# Patient Record
Sex: MALE | Race: BLACK OR AFRICAN AMERICAN | NOT HISPANIC OR LATINO | Employment: OTHER | RURAL
[De-identification: names, ages, dates, MRNs, and addresses within clinical notes are randomized per-mention and may not be internally consistent; named-entity substitution may affect disease eponyms.]

---

## 2019-10-31 ENCOUNTER — HISTORICAL (OUTPATIENT)
Dept: ADMINISTRATIVE | Facility: HOSPITAL | Age: 79
End: 2019-10-31

## 2019-11-01 LAB
LAB AP CLINICAL INFORMATION: NORMAL
LAB AP DIAGNOSIS - HISTORICAL: NORMAL
LAB AP GROSS PATHOLOGY - HISTORICAL: NORMAL
LAB AP SPECIMEN SUBMITTED - HISTORICAL: NORMAL

## 2022-11-08 ENCOUNTER — OFFICE VISIT (OUTPATIENT)
Dept: UROLOGY | Facility: CLINIC | Age: 82
End: 2022-11-08
Payer: MEDICARE

## 2022-11-08 VITALS
BODY MASS INDEX: 22.58 KG/M2 | SYSTOLIC BLOOD PRESSURE: 136 MMHG | HEIGHT: 68 IN | HEART RATE: 112 BPM | DIASTOLIC BLOOD PRESSURE: 84 MMHG | WEIGHT: 149 LBS

## 2022-11-08 DIAGNOSIS — N41.1 CHRONIC PROSTATITIS: ICD-10-CM

## 2022-11-08 DIAGNOSIS — N40.1 BENIGN PROSTATIC HYPERPLASIA WITH NOCTURIA: ICD-10-CM

## 2022-11-08 DIAGNOSIS — R35.1 BENIGN PROSTATIC HYPERPLASIA WITH NOCTURIA: ICD-10-CM

## 2022-11-08 DIAGNOSIS — R80.9 PROTEINURIA, UNSPECIFIED TYPE: ICD-10-CM

## 2022-11-08 DIAGNOSIS — N52.9 ERECTILE DYSFUNCTION, UNSPECIFIED ERECTILE DYSFUNCTION TYPE: ICD-10-CM

## 2022-11-08 DIAGNOSIS — R35.1 NOCTURIA: Primary | ICD-10-CM

## 2022-11-08 PROCEDURE — 99214 PR OFFICE/OUTPT VISIT, EST, LEVL IV, 30-39 MIN: ICD-10-PCS | Mod: S$PBB,,, | Performed by: UROLOGY

## 2022-11-08 PROCEDURE — 3079F DIAST BP 80-89 MM HG: CPT | Mod: CPTII,,, | Performed by: UROLOGY

## 2022-11-08 PROCEDURE — 99213 OFFICE O/P EST LOW 20 MIN: CPT | Mod: PBBFAC | Performed by: UROLOGY

## 2022-11-08 PROCEDURE — 99214 OFFICE O/P EST MOD 30 MIN: CPT | Mod: S$PBB,,, | Performed by: UROLOGY

## 2022-11-08 PROCEDURE — 1160F PR REVIEW ALL MEDS BY PRESCRIBER/CLIN PHARMACIST DOCUMENTED: ICD-10-PCS | Mod: CPTII,,, | Performed by: UROLOGY

## 2022-11-08 PROCEDURE — 3075F PR MOST RECENT SYSTOLIC BLOOD PRESS GE 130-139MM HG: ICD-10-PCS | Mod: CPTII,,, | Performed by: UROLOGY

## 2022-11-08 PROCEDURE — 3075F SYST BP GE 130 - 139MM HG: CPT | Mod: CPTII,,, | Performed by: UROLOGY

## 2022-11-08 PROCEDURE — 1159F PR MEDICATION LIST DOCUMENTED IN MEDICAL RECORD: ICD-10-PCS | Mod: CPTII,,, | Performed by: UROLOGY

## 2022-11-08 PROCEDURE — 3079F PR MOST RECENT DIASTOLIC BLOOD PRESSURE 80-89 MM HG: ICD-10-PCS | Mod: CPTII,,, | Performed by: UROLOGY

## 2022-11-08 PROCEDURE — 1159F MED LIST DOCD IN RCRD: CPT | Mod: CPTII,,, | Performed by: UROLOGY

## 2022-11-08 PROCEDURE — 1160F RVW MEDS BY RX/DR IN RCRD: CPT | Mod: CPTII,,, | Performed by: UROLOGY

## 2022-11-08 RX ORDER — DESMOPRESSIN ACETATE 0.2 MG/1
0.2 TABLET ORAL NIGHTLY
Qty: 30 TABLET | Refills: 11 | Status: SHIPPED | OUTPATIENT
Start: 2022-11-08 | End: 2023-11-08

## 2022-11-08 RX ORDER — NIFEDIPINE 30 MG/1
30 TABLET, FILM COATED, EXTENDED RELEASE ORAL DAILY
COMMUNITY
Start: 2022-09-03

## 2022-11-11 NOTE — PROGRESS NOTES
Subjective:       Patient ID: Pardeep Collins is a 82 y.o. male.    Chief Complaint: Prostate Check (Last seen 10/17/2019)    History of Present Illness  Mr. Collins is in for one-year followup visit. He does have nocturia about 3 times nightly but feels he is voiding okay and feels like he is doing as well as is a year ago. He Thought he had PSA done but there is not in the system so we need to get a PSA done as he is only 71 years old. The main thing we have treated for over the years is been for erectile dysfunction problems. We taught him intracavernosal injection technique several years ago and he still uses this for impotence. Voids fairly well and says only that he takes to help him with urination is saw palmetto that the takes over-the-counter.  Only other health problems past year has been from gout involving his feet. He says that he is having to reduce the amount of red meat that he ingests because of this problem.    The above note is the note of December 2011  and Mr. Collins has had a generally good year. No flareups of gout. Nocturia about 3 times nightly but has less daytime frequency. No significant health problems.  In for PSA and recheck.    The above note is a note of December 11, 2012. Mr. Collins continues to have problems with nocturia which is now 5 or 6 times nightly typically. He has decreased urine output during the daytime but nighttime is when he makes all of his urine seemingly. He has also had flareups of gout since we saw him last. Patient has elevated protein and apparently something is missing in his blood and is going to see oncologist for this.  [May 19, 2014]    Mr. Collins is in for yearly checkup. He has not had PSA done recently. He says he is voiding well but he does have nocturia several times nightly. Has not been taking his generic tamsulosin in recent past and as apparently prescription has run out. He had not notified us of this. He also needs his triple mix renewed. Overall  uneventful year but if his bladder outlet obstructive symptoms don't improve once Flomax is renewed, he probably needs a full workup.  [June 29, 2015]    Mr. Collins is in for one year follow-up visit. He is satisfied that he is doing okay but he continues to have marked nocturia with nocturia 5 or 6 times nightly. He only voids rather infrequently during the daytime so he has a diurnal variation. This has been going on for a long time and he does not consider it to be a major problem. He has not had any benefit from taking Flomax and desires no additional help with this at this time. He has not been using the triple mix in recent past does not need a new prescription for that at this time. [July 26, 2016]    Mr. Collins is in for yearly check  with PSA. He continues with the diurnal variation with significant nocturia but no daytime frequency. He has not benefited by treating this with medications in the past. He has learned that meat products affect his gout problems and he has reduced meat in his diet and it has improved his situation with gout.  He ate a lot of chicken salad and salt last night. He thinks that his blood pressure elevation is related to that. Overall satisfactory year.  [July 31, 2017]    Mr. Collins continues with his problem with nocturnal polyuria. He has nocturia 6 or 7 times nightly but very little urine output during the daytime. He has tried various things including alpha blockers but they did not seem to help him. This is in aggravating problem but something that he has had to learn to live with. He needs to get his PSA as it was not drawn when he came in. He called Dr. Luke' office to see if he had it when he was there. No new health problems. He wants his triple mix renewed as he has not used it in recent past, and did not do as well the last time he tried. [September 27, 2018]    Mr. Collins is in for yearly checkup. Had his PSA drawn but not available until after he left. It was 1.820 and he  will be notified of results. He feels he is stable from   standpoint currently although he had some frequency few months ago and has chronic nocturia as previously mentioned. Probably has nocturnal polyuria.   He has been recovering from shingles that developed on his right lateral chest and upper abdomen about 3 months ago. Still having discomfort from that. Has had some constipation problems probably associated with dietary changes related to that. Patient has narcolepsy but says he can't have medication for that because he cannot afford it. I'm not sure what the medication is. He seems to do all right when he sleeps in daytime so may need to change his sleep schedule. Patient currently not using triple mix and does not need renewal of that. He does have proteinuria and we may need to do a 24-hour urine for protein on him. [October 17, 2019]    PSA Results:  Past PSA Results   PSA was 1.820 on October 17, 2019  PSA was 1.780 on September 27, 2018  PS A was 2.010 on July 31, 2017  PSA was 1.490 on July 26, 2016  PSA was 1.220 on June 29, 2015  PSA was 1.660 on May 13, 2014  PSA was 1.870 on December 11, 2012, Past PSA Results   PSA on December 5, 2011 was 1.48  PSA on October 26, 2010 was 1.02  PSA on October 26, 2009 was 1.0  PSA on October 16, 2007 was 0.7  PSA on October 5, 2006 was 1.2  PSA on October 24, 2005 1.28  PSA on August 11, 2000 was 1.2  ---------------------------------------------------------------------------------------------------------------------------------------------------------------------------------------------------------------------------------------------------------------------------------------------------------------------  The notes above are from the old electronic medical record system.  Patient seen for the 1st time in 3 years.    Mr. Collins returns today with similar complaints about the urinary frequency at night.  This has been a complaint for several years.  We have tried  to suggest he try sleeping in the daytime previously but he did not like that plan.  He saw a urologist in [place] and had a UroLift procedure done which is not helped him any.  That is not surprising because I do not think his problem is related to prostate enlargement.  He still complains of the nocturia 7-8 times nightly despite multiple medication attempts in the past unsuccessfully.  Alpha blockers have been tried on several occasions unsuccessfully.  He has the same complaint.  He also still has protein in the urine which has been documented in the past.  Apparently is seeing Dr. Aceves now presumably for that.  [November 8, 2022]    Review of Systems      Objective:      Physical Exam  Constitutional:       Appearance: Normal appearance. He is normal weight.   Neurological:      Mental Status: He is alert.   Psychiatric:         Mood and Affect: Mood normal.         Behavior: Behavior normal.     Urinalysis shows only occasional pus cells and amorphous crystals.  Dipstick had 4+ protein with pH 6.0 and specific gravity 1.020  Assessment:       Problem List Items Addressed This Visit    None  Visit Diagnoses       Nocturia    -  Primary    Proteinuria, unspecified type        Benign prostatic hyperplasia with nocturia        Chronic prostatitis        Erectile dysfunction, unspecified erectile dysfunction type                Plan:         We will try patient on desmopressin empirically.  We do not have any studies to confirm diabetes insipidus although this could be contributing cause.  We will see again in a couple months and decide if any additional testing needed.  He will need to have sodium checked if he is going to take this on a regular basis.

## 2022-11-11 NOTE — PATIENT INSTRUCTIONS
We will try patient on desmopressin empirically.  We do not have any studies to confirm diabetes insipidus although this could be contributing cause.  We will see again in a couple months and decide if any additional testing needed.  He will need to have sodium checked if he is going to take this on a regular basis.

## 2023-01-30 ENCOUNTER — OFFICE VISIT (OUTPATIENT)
Dept: UROLOGY | Facility: CLINIC | Age: 83
End: 2023-01-30
Payer: MEDICARE

## 2023-01-30 VITALS
HEIGHT: 68 IN | WEIGHT: 145 LBS | BODY MASS INDEX: 21.98 KG/M2 | HEART RATE: 105 BPM | DIASTOLIC BLOOD PRESSURE: 88 MMHG | SYSTOLIC BLOOD PRESSURE: 141 MMHG

## 2023-01-30 DIAGNOSIS — R80.9 PROTEINURIA, UNSPECIFIED TYPE: ICD-10-CM

## 2023-01-30 DIAGNOSIS — R31.9 URINARY TRACT INFECTION WITH HEMATURIA, SITE UNSPECIFIED: ICD-10-CM

## 2023-01-30 DIAGNOSIS — R35.1 NOCTURIA: Primary | ICD-10-CM

## 2023-01-30 DIAGNOSIS — N41.1 CHRONIC PROSTATITIS: ICD-10-CM

## 2023-01-30 DIAGNOSIS — R35.1 BENIGN PROSTATIC HYPERPLASIA WITH NOCTURIA: ICD-10-CM

## 2023-01-30 DIAGNOSIS — N39.0 URINARY TRACT INFECTION WITH HEMATURIA, SITE UNSPECIFIED: ICD-10-CM

## 2023-01-30 DIAGNOSIS — N52.9 ERECTILE DYSFUNCTION, UNSPECIFIED ERECTILE DYSFUNCTION TYPE: ICD-10-CM

## 2023-01-30 DIAGNOSIS — N40.1 BENIGN PROSTATIC HYPERPLASIA WITH NOCTURIA: ICD-10-CM

## 2023-01-30 PROCEDURE — 3288F FALL RISK ASSESSMENT DOCD: CPT | Mod: CPTII,,, | Performed by: UROLOGY

## 2023-01-30 PROCEDURE — 1159F MED LIST DOCD IN RCRD: CPT | Mod: CPTII,,, | Performed by: UROLOGY

## 2023-01-30 PROCEDURE — 1101F PR PT FALLS ASSESS DOC 0-1 FALLS W/OUT INJ PAST YR: ICD-10-PCS | Mod: CPTII,,, | Performed by: UROLOGY

## 2023-01-30 PROCEDURE — 99213 PR OFFICE/OUTPT VISIT, EST, LEVL III, 20-29 MIN: ICD-10-PCS | Mod: S$PBB,,, | Performed by: UROLOGY

## 2023-01-30 PROCEDURE — 1159F PR MEDICATION LIST DOCUMENTED IN MEDICAL RECORD: ICD-10-PCS | Mod: CPTII,,, | Performed by: UROLOGY

## 2023-01-30 PROCEDURE — 1160F PR REVIEW ALL MEDS BY PRESCRIBER/CLIN PHARMACIST DOCUMENTED: ICD-10-PCS | Mod: CPTII,,, | Performed by: UROLOGY

## 2023-01-30 PROCEDURE — 1126F PR PAIN SEVERITY QUANTIFIED, NO PAIN PRESENT: ICD-10-PCS | Mod: CPTII,,, | Performed by: UROLOGY

## 2023-01-30 PROCEDURE — 1160F RVW MEDS BY RX/DR IN RCRD: CPT | Mod: CPTII,,, | Performed by: UROLOGY

## 2023-01-30 PROCEDURE — 3077F SYST BP >= 140 MM HG: CPT | Mod: CPTII,,, | Performed by: UROLOGY

## 2023-01-30 PROCEDURE — 3288F PR FALLS RISK ASSESSMENT DOCUMENTED: ICD-10-PCS | Mod: CPTII,,, | Performed by: UROLOGY

## 2023-01-30 PROCEDURE — 1101F PT FALLS ASSESS-DOCD LE1/YR: CPT | Mod: CPTII,,, | Performed by: UROLOGY

## 2023-01-30 PROCEDURE — 3079F PR MOST RECENT DIASTOLIC BLOOD PRESSURE 80-89 MM HG: ICD-10-PCS | Mod: CPTII,,, | Performed by: UROLOGY

## 2023-01-30 PROCEDURE — 87086 CULTURE, URINE: ICD-10-PCS | Mod: ,,, | Performed by: CLINICAL MEDICAL LABORATORY

## 2023-01-30 PROCEDURE — 87086 URINE CULTURE/COLONY COUNT: CPT | Mod: ,,, | Performed by: CLINICAL MEDICAL LABORATORY

## 2023-01-30 PROCEDURE — 99213 OFFICE O/P EST LOW 20 MIN: CPT | Mod: PBBFAC | Performed by: UROLOGY

## 2023-01-30 PROCEDURE — 3079F DIAST BP 80-89 MM HG: CPT | Mod: CPTII,,, | Performed by: UROLOGY

## 2023-01-30 PROCEDURE — 99213 OFFICE O/P EST LOW 20 MIN: CPT | Mod: S$PBB,,, | Performed by: UROLOGY

## 2023-01-30 PROCEDURE — 3077F PR MOST RECENT SYSTOLIC BLOOD PRESSURE >= 140 MM HG: ICD-10-PCS | Mod: CPTII,,, | Performed by: UROLOGY

## 2023-01-30 PROCEDURE — 1126F AMNT PAIN NOTED NONE PRSNT: CPT | Mod: CPTII,,, | Performed by: UROLOGY

## 2023-01-30 NOTE — PROGRESS NOTES
Subjective:       Patient ID: Pardeep Collins is a 82 y.o. male.    Chief Complaint: Follow-up (2 month follow up)    History of Present Illness  Mr. Collins is in for one-year followup visit. He does have nocturia about 3 times nightly but feels he is voiding okay and feels like he is doing as well as is a year ago. He Thought he had PSA done but there is not in the system so we need to get a PSA done as he is only 71 years old. The main thing we have treated for over the years is been for erectile dysfunction problems. We taught him intracavernosal injection technique several years ago and he still uses this for impotence. Voids fairly well and says only that he takes to help him with urination is saw palmetto that the takes over-the-counter.  Only other health problems past year has been from gout involving his feet. He says that he is having to reduce the amount of red meat that he ingests because of this problem.     The above note is the note of December 2011  and Mr. Collins has had a generally good year. No flareups of gout. Nocturia about 3 times nightly but has less daytime frequency. No significant health problems.  In for PSA and recheck.     The above note is a note of December 11, 2012. Mr. Collins continues to have problems with nocturia which is now 5 or 6 times nightly typically. He has decreased urine output during the daytime but nighttime is when he makes all of his urine seemingly. He has also had flareups of gout since we saw him last. Patient has elevated protein and apparently something is missing in his blood and is going to see oncologist for this.  [May 19, 2014]     Mr. Collins is in for yearly checkup. He has not had PSA done recently. He says he is voiding well but he does have nocturia several times nightly. Has not been taking his generic tamsulosin in recent past and as apparently prescription has run out. He had not notified us of this. He also needs his triple mix renewed. Overall uneventful  year but if his bladder outlet obstructive symptoms don't improve once Flomax is renewed, he probably needs a full workup.  [June 29, 2015]     Mr. Collins is in for one year follow-up visit. He is satisfied that he is doing okay but he continues to have marked nocturia with nocturia 5 or 6 times nightly. He only voids rather infrequently during the daytime so he has a diurnal variation. This has been going on for a long time and he does not consider it to be a major problem. He has not had any benefit from taking Flomax and desires no additional help with this at this time. He has not been using the triple mix in recent past does not need a new prescription for that at this time. [July 26, 2016]     Mr. Collins is in for yearly check  with PSA. He continues with the diurnal variation with significant nocturia but no daytime frequency. He has not benefited by treating this with medications in the past. He has learned that meat products affect his gout problems and he has reduced meat in his diet and it has improved his situation with gout.  He ate a lot of chicken salad and salt last night. He thinks that his blood pressure elevation is related to that. Overall satisfactory year.  [July 31, 2017]     Mr. Collins continues with his problem with nocturnal polyuria. He has nocturia 6 or 7 times nightly but very little urine output during the daytime. He has tried various things including alpha blockers but they did not seem to help him. This is in aggravating problem but something that he has had to learn to live with. He needs to get his PSA as it was not drawn when he came in. He called Dr. Luke' office to see if he had it when he was there. No new health problems. He wants his triple mix renewed as he has not used it in recent past, and did not do as well the last time he tried. [September 27, 2018]     Mr. Collins is in for yearly checkup. Had his PSA drawn but not available until after he left. It was 1.820 and he will  be notified of results. He feels he is stable from   standpoint currently although he had some frequency few months ago and has chronic nocturia as previously mentioned. Probably has nocturnal polyuria.   He has been recovering from shingles that developed on his right lateral chest and upper abdomen about 3 months ago. Still having discomfort from that. Has had some constipation problems probably associated with dietary changes related to that. Patient has narcolepsy but says he can't have medication for that because he cannot afford it. I'm not sure what the medication is. He seems to do all right when he sleeps in daytime so may need to change his sleep schedule. Patient currently not using triple mix and does not need renewal of that. He does have proteinuria and we may need to do a 24-hour urine for protein on him. [October 17, 2019]     PSA Results:  Past PSA Results   PSA was 1.820 on October 17, 2019  PSA was 1.780 on September 27, 2018  PS A was 2.010 on July 31, 2017  PSA was 1.490 on July 26, 2016  PSA was 1.220 on June 29, 2015  PSA was 1.660 on May 13, 2014  PSA was 1.870 on December 11, 2012, Past PSA Results   PSA on December 5, 2011 was 1.48  PSA on October 26, 2010 was 1.02  PSA on October 26, 2009 was 1.0  PSA on October 16, 2007 was 0.7  PSA on October 5, 2006 was 1.2  PSA on October 24, 2005 1.28  PSA on August 11, 2000 was 1.2  ---------------------------------------------------------------------------------------------------------------------------------------------------------------------------------------------------------------------------------------------------------------------------------------------------------------------  The notes above are from the old electronic medical record system.  Patient seen for the 1st time in 3 years.     Mr. Collins returns today with similar complaints about the urinary frequency at night.  This has been a complaint for several years.  We have tried to  suggest he try sleeping in the daytime previously but he did not like that plan.  He saw a urologist in [place] and had a UroLift procedure done which is not helped him any.  That is not surprising because I do not think his problem is related to prostate enlargement.  He still complains of the nocturia 7-8 times nightly despite multiple medication attempts in the past unsuccessfully.  Alpha blockers have been tried on several occasions unsuccessfully.  He has the same complaint.  He also still has protein in the urine which has been documented in the past.  Apparently is seeing Dr. Aceves now presumably for that.  [November 8, 2022]    Mr. Collins has not improved significantly with his nocturia.  Apparently he had sleep study sometime ago and was told he had narcolepsy.  He says he does not sleep well because of the nocturia but I suspect the problem is more complicated than that.  I tried him on DDAVP last time but he says that did not seem to help any either.  He says he wants to try some product that he saw on TV but I am unsure what that is.  He is supposed to see Dr. Chandler Aceves for renal function follow-up as apparently he has some azotemia.  Obviously complicated and difficult problem.  He is worried because he has continued to lose weight and has lost 4 lb in the last 2 months.  His weight just a few years ago was 170. [January 30, 2023]  Review of Systems      Objective:      Physical Exam  Constitutional:       Appearance: Normal appearance. He is normal weight.   Neurological:      Mental Status: He is alert.   Psychiatric:         Mood and Affect: Mood normal.         Behavior: Behavior normal.     Urinalysis revealed occasional pus cells and occasional red cells.  Dipstick had 4+ protein with 1+ blood, 1+ leukocytes, pH of 6, and specific gravity 1.025  Assessment:       Problem List Items Addressed This Visit    None  Visit Diagnoses       Nocturia    -  Primary    Urinary tract infection with hematuria,  site unspecified        Relevant Orders    Urine culture    Proteinuria, unspecified type        Benign prostatic hyperplasia with nocturia        Chronic prostatitis        Erectile dysfunction, unspecified erectile dysfunction type                Plan:         I suspect he needs to see Dr. Aceves about his proteinuria.  He is unsure exactly why he is scheduled see him.  Urine sent for C& S.  We will treat specifically for any infection.  I will see again in a few months as needed.

## 2023-01-31 NOTE — PATIENT INSTRUCTIONS
I suspect he needs to see Dr. Aceves about his proteinuria.  He is unsure exactly why he is scheduled see him.  Urine sent for C& S.  We will treat specifically for any infection.  I will see again in a few months as needed.

## 2023-02-01 LAB — UA COMPLETE W REFLEX CULTURE PNL UR: NO GROWTH

## 2023-03-02 ENCOUNTER — OFFICE VISIT (OUTPATIENT)
Dept: SURGERY | Facility: CLINIC | Age: 83
End: 2023-03-02
Attending: SURGERY
Payer: MEDICARE

## 2023-03-02 DIAGNOSIS — N18.6 ESRD (END STAGE RENAL DISEASE): Primary | ICD-10-CM

## 2023-03-02 PROCEDURE — 99204 OFFICE O/P NEW MOD 45 MIN: CPT | Mod: S$PBB,,, | Performed by: SURGERY

## 2023-03-02 PROCEDURE — 1159F PR MEDICATION LIST DOCUMENTED IN MEDICAL RECORD: ICD-10-PCS | Mod: CPTII,,, | Performed by: SURGERY

## 2023-03-02 PROCEDURE — 99204 PR OFFICE/OUTPT VISIT, NEW, LEVL IV, 45-59 MIN: ICD-10-PCS | Mod: S$PBB,,, | Performed by: SURGERY

## 2023-03-02 PROCEDURE — 1159F MED LIST DOCD IN RCRD: CPT | Mod: CPTII,,, | Performed by: SURGERY

## 2023-03-02 PROCEDURE — 99213 OFFICE O/P EST LOW 20 MIN: CPT | Mod: PBBFAC | Performed by: SURGERY

## 2023-03-03 NOTE — PROGRESS NOTES
General Surgery History and Physical      Patient ID: Pardeep Collins is a 82 y.o. male.    Chief Complaint: Other (PD placement)      HPI:  82-year-old male with a history of chronic kidney disease.  History of hypertension and is here for evaluation for dialysis access.  Never had dialysis before.  He is right-handed.  No history of diabetes just hypertension on 1 blood pressure medication.  Initially was consult for possible peritoneal dialysis catheter however patient does not feel like he is able to take care of it himself for do dialysis and was explaining he was under the impression he would go to a dialysis center 3 times a week for them to do it for him thus describing hemodialysis.    Review of Systems   Constitutional:  Negative for activity change, appetite change, fatigue and fever.   HENT:  Negative for trouble swallowing.    Respiratory:  Negative for cough and shortness of breath.    Cardiovascular:  Negative for chest pain and palpitations.   Gastrointestinal:  Negative for abdominal distention, abdominal pain, blood in stool, constipation and diarrhea.   Genitourinary:  Negative for flank pain.   Musculoskeletal:  Negative for neck pain and neck stiffness.   Neurological:  Positive for weakness.     Current Outpatient Medications   Medication Sig Dispense Refill    NIFEdipine (ADALAT CC) 30 MG TbSR Take 30 mg by mouth once daily.      desmopressin (DDAVP) 0.2 MG tablet Take 1 tablet (200 mcg total) by mouth nightly. (Patient not taking: Reported on 1/30/2023) 30 tablet 11     No current facility-administered medications for this visit.       Review of patient's allergies indicates:  No Known Allergies    Past Medical History:   Diagnosis Date    Elevated PSA     Gout, unspecified     Hypertension     Rheumatoid arthritis, unspecified        Past Surgical History:   Procedure Laterality Date    HAND SURGERY  Left     urolift      in [place];       Family History   Problem Relation Age of Onset    Heart disease Father     Breast cancer Sister        Social History     Socioeconomic History    Marital status: Single   Tobacco Use    Smoking status: Former     Types: Cigarettes     Quit date:      Years since quittin.1    Smokeless tobacco: Never   Substance and Sexual Activity    Alcohol use: Not Currently     Comment: quit in     Drug use: Never       There were no vitals filed for this visit.    Physical Exam  Constitutional:       General: He is not in acute distress.  HENT:      Head: Normocephalic.   Cardiovascular:      Rate and Rhythm: Normal rate and regular rhythm.      Pulses: Normal pulses.   Pulmonary:      Effort: Pulmonary effort is normal. No respiratory distress.      Breath sounds: Normal breath sounds.   Abdominal:      General: Abdomen is flat. There is no distension.      Palpations: Abdomen is soft.      Tenderness: There is no abdominal tenderness.   Musculoskeletal:         General: Normal range of motion.   Skin:     General: Skin is warm.   Neurological:      General: No focal deficit present.      Mental Status: He is oriented to person, place, and time.       Assessment & Plan:    ESRD (end stage renal disease)  -     US Vein Mapping, Hemodialysis, Bilateral; Future; Expected date: 2023        Will obtain vein mapping of the patient.  Patient seems to be scribing hemodialysis as the best opportunity for him.  Will also discuss with him when he comes back to make sure what type of access he wants.  Risks and benefits explained all questions were answered.

## 2023-03-16 ENCOUNTER — HOSPITAL ENCOUNTER (OUTPATIENT)
Dept: RADIOLOGY | Facility: HOSPITAL | Age: 83
Discharge: HOME | End: 2023-03-16
Attending: SURGERY
Payer: MEDICARE

## 2023-03-16 ENCOUNTER — OFFICE VISIT (OUTPATIENT)
Dept: SURGERY | Facility: CLINIC | Age: 83
End: 2023-03-16
Attending: SURGERY
Payer: MEDICARE

## 2023-03-16 DIAGNOSIS — N18.6 ESRD (END STAGE RENAL DISEASE): ICD-10-CM

## 2023-03-16 DIAGNOSIS — N18.6 ESRD (END STAGE RENAL DISEASE): Primary | ICD-10-CM

## 2023-03-16 PROCEDURE — 1159F PR MEDICATION LIST DOCUMENTED IN MEDICAL RECORD: ICD-10-PCS | Mod: CPTII,,, | Performed by: SURGERY

## 2023-03-16 PROCEDURE — 93985 US VEIN MAPPING, HEMODIALYSIS, BILATERAL: ICD-10-PCS | Mod: 26,,, | Performed by: SURGERY

## 2023-03-16 PROCEDURE — 93985 DUP-SCAN HEMO COMPL BI STD: CPT | Mod: TC

## 2023-03-16 PROCEDURE — 93985 DUP-SCAN HEMO COMPL BI STD: CPT | Mod: 26,,, | Performed by: SURGERY

## 2023-03-16 PROCEDURE — 99214 OFFICE O/P EST MOD 30 MIN: CPT | Mod: S$PBB,,, | Performed by: SURGERY

## 2023-03-16 PROCEDURE — 99214 PR OFFICE/OUTPT VISIT, EST, LEVL IV, 30-39 MIN: ICD-10-PCS | Mod: S$PBB,,, | Performed by: SURGERY

## 2023-03-16 PROCEDURE — 99213 OFFICE O/P EST LOW 20 MIN: CPT | Mod: PBBFAC,25 | Performed by: SURGERY

## 2023-03-16 PROCEDURE — 1159F MED LIST DOCD IN RCRD: CPT | Mod: CPTII,,, | Performed by: SURGERY

## 2023-03-16 RX ORDER — SODIUM CHLORIDE 9 MG/ML
INJECTION, SOLUTION INTRAVENOUS CONTINUOUS
Status: CANCELLED | OUTPATIENT
Start: 2023-03-16

## 2023-03-16 NOTE — PATIENT INSTRUCTIONS
[facility]                                                                                   Day of Surgery      Your surgery is scheduled for 03/20/2023 at [facility] on the ground floor of the Ambulatory building.     Your arrival time is 0730am.              DO NOT EAT OR DRINK ANYTHING AFTER MIDNIGHT.          You may take blood pressure medication with a small drink of water the morning of surgery.             Medication Instructions:                   Please bring all medications, that you are currently taking, with you to the hospital the morning of your procedure.            DO NOT TAKE INSULIN OR ANY OTHER BLOOD SUGAR MEDICATIONS.          The following blood sugar medications have to be stopped 48 hours prior to surgery:                    Metformin        Glucovance          Metaglip             Fortamet           Glucophage                   Riomet             Avandamet          Glimepiride              IF YOU ARE ON ANY OF THESE BLOOD THINNERS, MAKE SURE YOUR PHYSICIAN IS AWARE.                Eliquis/Apixaban             Xarelto/Rivaroxaban             Plavix/Clopidogrel                  Wafarin/Coumadin,Jantoven           Pletal/Cilostazol              Pradaxa/Dibigatran                           PLEASE USE CHLORHEXIDINE WASH THE NIGHT BEFORE SURGERY AND THE MORNING OF SURGERY.           Wear clean loose-fitting clothing and non-skid shoes to the hospital.           YOU CANNOT DRIVE YOURSELF HOME FROM THE HOSPITAL THE DAY OF SURGERY.                Please have a  with you.           Please leave all valuables at home.             Children under the age of 18 must be accompanied by an adult.    Safety measures:                All jewelry (rings, bracelets, and piercings) must be  removed prior to surgery.         Artifical eye lashes must be removed prior to surgery.              PLEASE UNDERSTAND THAT OUR OFFICE DOES NOT GIVE PATHOLOGY RESULTS OR TEST RESULTS OVER THE PHONE.         THIS WILL BE DISCUSSED WITH YOU ON YOUR FOLLOW UP APPOINTMENT TO DISCUSS IN PERSON.

## 2023-03-16 NOTE — H&P (VIEW-ONLY)
General Surgery History and Physical      Patient ID: Pardeep Collins is a 82 y.o. male.    Chief Complaint: Follow-up (F/u vein mapping results and discuss dialysis access surgery)      HPI:  Patient here after vein mapping for placement of fistula.  He has no access right now and is pending renal dysfunction.  He says he has been very fatigued not much appetite and just some malaise.  Was instructed to call his nephrologist in case he needs urgent dialysis.  Otherwise his vein mapping showed he is got 3 mm or greater right basilic vein which will we will attempt to do for his possible placement.    Review of Systems   Constitutional:  Positive for activity change, appetite change, chills and fatigue. Negative for fever.   HENT:  Negative for trouble swallowing.    Respiratory:  Negative for cough and shortness of breath.    Cardiovascular:  Negative for chest pain and palpitations.   Gastrointestinal:  Negative for abdominal distention, abdominal pain, blood in stool, constipation and diarrhea.   Genitourinary:  Negative for flank pain.   Musculoskeletal:  Negative for neck pain and neck stiffness.   Neurological:  Negative for weakness.     Current Outpatient Medications   Medication Sig Dispense Refill    desmopressin (DDAVP) 0.2 MG tablet Take 1 tablet (200 mcg total) by mouth nightly. (Patient not taking: Reported on 1/30/2023) 30 tablet 11    NIFEdipine (ADALAT CC) 30 MG TbSR Take 30 mg by mouth once daily.       No current facility-administered medications for this visit.       Review of patient's allergies indicates:  No Known Allergies    Past Medical History:   Diagnosis Date    Elevated PSA     Gout, unspecified     Hypertension     Rheumatoid arthritis, unspecified        Past Surgical History:   Procedure Laterality Date    HAND SURGERY Left     urolift      in [place];       Family History   Problem Relation Age  of Onset    Heart disease Father     Breast cancer Sister        Social History     Socioeconomic History    Marital status: Single   Tobacco Use    Smoking status: Former     Types: Cigarettes     Quit date:      Years since quittin.2    Smokeless tobacco: Never   Substance and Sexual Activity    Alcohol use: Not Currently     Comment: quit in     Drug use: Never       There were no vitals filed for this visit.    Physical Exam  Constitutional:       General: He is not in acute distress.  HENT:      Head: Normocephalic.   Cardiovascular:      Rate and Rhythm: Normal rate and regular rhythm.      Pulses: Normal pulses.   Pulmonary:      Effort: Pulmonary effort is normal. No respiratory distress.      Breath sounds: Normal breath sounds.   Abdominal:      General: Abdomen is flat. There is no distension.      Palpations: Abdomen is soft.      Tenderness: There is no abdominal tenderness.   Musculoskeletal:         General: Normal range of motion.   Skin:     General: Skin is warm.   Neurological:      General: No focal deficit present.      Mental Status: He is oriented to person, place, and time.       Assessment & Plan:    ESRD (end stage renal disease)        Patient to go to the OR on 2023 for right basilic vein transposition.  Risks and benefits explained to the patient clear risk of bleeding, infection, failure to heal, failure to grow, possible need for revision, possible steal syndrome, possible nerve injury, and possible need for additional operations.  He understands he may need a catheter at some point if he continues to decline overall.  Will do some preoperative labs and check before the surgery.  All questions were answered.

## 2023-03-16 NOTE — PROGRESS NOTES
General Surgery History and Physical      Patient ID: Pardeep Collins is a 82 y.o. male.    Chief Complaint: Follow-up (F/u vein mapping results and discuss dialysis access surgery)      HPI:  Patient here after vein mapping for placement of fistula.  He has no access right now and is pending renal dysfunction.  He says he has been very fatigued not much appetite and just some malaise.  Was instructed to call his nephrologist in case he needs urgent dialysis.  Otherwise his vein mapping showed he is got 3 mm or greater right basilic vein which will we will attempt to do for his possible placement.    Review of Systems   Constitutional:  Positive for activity change, appetite change, chills and fatigue. Negative for fever.   HENT:  Negative for trouble swallowing.    Respiratory:  Negative for cough and shortness of breath.    Cardiovascular:  Negative for chest pain and palpitations.   Gastrointestinal:  Negative for abdominal distention, abdominal pain, blood in stool, constipation and diarrhea.   Genitourinary:  Negative for flank pain.   Musculoskeletal:  Negative for neck pain and neck stiffness.   Neurological:  Negative for weakness.     Current Outpatient Medications   Medication Sig Dispense Refill    desmopressin (DDAVP) 0.2 MG tablet Take 1 tablet (200 mcg total) by mouth nightly. (Patient not taking: Reported on 1/30/2023) 30 tablet 11    NIFEdipine (ADALAT CC) 30 MG TbSR Take 30 mg by mouth once daily.       No current facility-administered medications for this visit.       Review of patient's allergies indicates:  No Known Allergies    Past Medical History:   Diagnosis Date    Elevated PSA     Gout, unspecified     Hypertension     Rheumatoid arthritis, unspecified        Past Surgical History:   Procedure Laterality Date    HAND SURGERY Left     urolift      in [place];       Family History   Problem Relation Age  of Onset    Heart disease Father     Breast cancer Sister        Social History     Socioeconomic History    Marital status: Single   Tobacco Use    Smoking status: Former     Types: Cigarettes     Quit date:      Years since quittin.2    Smokeless tobacco: Never   Substance and Sexual Activity    Alcohol use: Not Currently     Comment: quit in     Drug use: Never       There were no vitals filed for this visit.    Physical Exam  Constitutional:       General: He is not in acute distress.  HENT:      Head: Normocephalic.   Cardiovascular:      Rate and Rhythm: Normal rate and regular rhythm.      Pulses: Normal pulses.   Pulmonary:      Effort: Pulmonary effort is normal. No respiratory distress.      Breath sounds: Normal breath sounds.   Abdominal:      General: Abdomen is flat. There is no distension.      Palpations: Abdomen is soft.      Tenderness: There is no abdominal tenderness.   Musculoskeletal:         General: Normal range of motion.   Skin:     General: Skin is warm.   Neurological:      General: No focal deficit present.      Mental Status: He is oriented to person, place, and time.       Assessment & Plan:    ESRD (end stage renal disease)        Patient to go to the OR on 2023 for right basilic vein transposition.  Risks and benefits explained to the patient clear risk of bleeding, infection, failure to heal, failure to grow, possible need for revision, possible steal syndrome, possible nerve injury, and possible need for additional operations.  He understands he may need a catheter at some point if he continues to decline overall.  Will do some preoperative labs and check before the surgery.  All questions were answered.

## 2023-03-20 ENCOUNTER — ANESTHESIA (OUTPATIENT)
Dept: SURGERY | Facility: HOSPITAL | Age: 83
End: 2023-03-20
Payer: MEDICARE

## 2023-03-20 ENCOUNTER — HOSPITAL ENCOUNTER (OUTPATIENT)
Facility: HOSPITAL | Age: 83
Discharge: HOME | End: 2023-03-20
Attending: SURGERY | Admitting: SURGERY
Payer: MEDICARE

## 2023-03-20 ENCOUNTER — ANESTHESIA EVENT (OUTPATIENT)
Dept: SURGERY | Facility: HOSPITAL | Age: 83
End: 2023-03-20
Payer: MEDICARE

## 2023-03-20 VITALS
DIASTOLIC BLOOD PRESSURE: 70 MMHG | OXYGEN SATURATION: 99 % | SYSTOLIC BLOOD PRESSURE: 138 MMHG | TEMPERATURE: 98 F | HEART RATE: 76 BPM | RESPIRATION RATE: 16 BRPM

## 2023-03-20 DIAGNOSIS — I10 HTN (HYPERTENSION): ICD-10-CM

## 2023-03-20 DIAGNOSIS — N18.6 ESRD (END STAGE RENAL DISEASE): Primary | ICD-10-CM

## 2023-03-20 LAB
ANION GAP SERPL CALCULATED.3IONS-SCNC: 16 MMOL/L (ref 7–16)
BASOPHILS # BLD AUTO: 0.01 K/UL (ref 0–0.2)
BASOPHILS NFR BLD AUTO: 0.2 % (ref 0–1)
BUN SERPL-MCNC: 52 MG/DL (ref 7–18)
BUN/CREAT SERPL: 8 (ref 6–20)
CALCIUM SERPL-MCNC: 9.3 MG/DL (ref 8.5–10.1)
CHLORIDE SERPL-SCNC: 108 MMOL/L (ref 98–107)
CO2 SERPL-SCNC: 18 MMOL/L (ref 21–32)
CREAT SERPL-MCNC: 6.9 MG/DL (ref 0.7–1.3)
DIFFERENTIAL METHOD BLD: ABNORMAL
EGFR (NO RACE VARIABLE) (RUSH/TITUS): 7 ML/MIN/1.73M²
EOSINOPHIL # BLD AUTO: 0.27 K/UL (ref 0–0.5)
EOSINOPHIL NFR BLD AUTO: 5.2 % (ref 1–4)
ERYTHROCYTE [DISTWIDTH] IN BLOOD BY AUTOMATED COUNT: 12.2 % (ref 11.5–14.5)
GLUCOSE SERPL-MCNC: 114 MG/DL (ref 74–106)
HCT VFR BLD AUTO: 25.5 % (ref 40–54)
HGB BLD-MCNC: 8.2 G/DL (ref 13.5–18)
IMM GRANULOCYTES # BLD AUTO: 0.01 K/UL (ref 0–0.04)
IMM GRANULOCYTES NFR BLD: 0.2 % (ref 0–0.4)
LYMPHOCYTES # BLD AUTO: 1.51 K/UL (ref 1–4.8)
LYMPHOCYTES NFR BLD AUTO: 29.1 % (ref 27–41)
MCH RBC QN AUTO: 31.1 PG (ref 27–31)
MCHC RBC AUTO-ENTMCNC: 32.2 G/DL (ref 32–36)
MCV RBC AUTO: 96.6 FL (ref 80–96)
MONOCYTES # BLD AUTO: 0.37 K/UL (ref 0–0.8)
MONOCYTES NFR BLD AUTO: 7.1 % (ref 2–6)
MPC BLD CALC-MCNC: 12.1 FL (ref 9.4–12.4)
NEUTROPHILS # BLD AUTO: 3.02 K/UL (ref 1.8–7.7)
NEUTROPHILS NFR BLD AUTO: 58.2 % (ref 53–65)
NRBC # BLD AUTO: 0 X10E3/UL
NRBC, AUTO (.00): 0 %
PLATELET # BLD AUTO: 124 K/UL (ref 150–400)
POTASSIUM SERPL-SCNC: 4.2 MMOL/L (ref 3.5–5.1)
RBC # BLD AUTO: 2.64 M/UL (ref 4.6–6.2)
SODIUM SERPL-SCNC: 138 MMOL/L (ref 136–145)
WBC # BLD AUTO: 5.19 K/UL (ref 4.5–11)

## 2023-03-20 PROCEDURE — 71000033 HC RECOVERY, INTIAL HOUR: Performed by: SURGERY

## 2023-03-20 PROCEDURE — 27201423 OPTIME MED/SURG SUP & DEVICES STERILE SUPPLY: Performed by: SURGERY

## 2023-03-20 PROCEDURE — 25000003 PHARM REV CODE 250: Performed by: SURGERY

## 2023-03-20 PROCEDURE — 27000655: Performed by: NURSE ANESTHETIST, CERTIFIED REGISTERED

## 2023-03-20 PROCEDURE — 71000015 HC POSTOP RECOV 1ST HR: Performed by: SURGERY

## 2023-03-20 PROCEDURE — 36819: ICD-10-PCS | Mod: RT,,, | Performed by: SURGERY

## 2023-03-20 PROCEDURE — 36000707: Performed by: SURGERY

## 2023-03-20 PROCEDURE — 36819 AV FUSE UPPR ARM BASILIC: CPT | Mod: RT,,, | Performed by: SURGERY

## 2023-03-20 PROCEDURE — D9220A PRA ANESTHESIA: Mod: CRNA,,, | Performed by: NURSE ANESTHETIST, CERTIFIED REGISTERED

## 2023-03-20 PROCEDURE — 27000716 HC OXISENSOR PROBE, ANY SIZE: Performed by: NURSE ANESTHETIST, CERTIFIED REGISTERED

## 2023-03-20 PROCEDURE — 93010 EKG 12-LEAD: ICD-10-PCS | Mod: ,,, | Performed by: INTERNAL MEDICINE

## 2023-03-20 PROCEDURE — 71000039 HC RECOVERY, EACH ADD'L HOUR: Performed by: SURGERY

## 2023-03-20 PROCEDURE — 63600175 PHARM REV CODE 636 W HCPCS: Performed by: ANESTHESIOLOGY

## 2023-03-20 PROCEDURE — D9220A PRA ANESTHESIA: ICD-10-PCS | Mod: CRNA,,, | Performed by: NURSE ANESTHETIST, CERTIFIED REGISTERED

## 2023-03-20 PROCEDURE — 36000706: Performed by: SURGERY

## 2023-03-20 PROCEDURE — 63600175 PHARM REV CODE 636 W HCPCS: Performed by: NURSE ANESTHETIST, CERTIFIED REGISTERED

## 2023-03-20 PROCEDURE — 27000177 HC AIRWAY, LARYNGEAL MASK: Performed by: NURSE ANESTHETIST, CERTIFIED REGISTERED

## 2023-03-20 PROCEDURE — 85025 COMPLETE CBC W/AUTO DIFF WBC: CPT | Performed by: SURGERY

## 2023-03-20 PROCEDURE — 80048 BASIC METABOLIC PNL TOTAL CA: CPT | Performed by: SURGERY

## 2023-03-20 PROCEDURE — 93010 ELECTROCARDIOGRAM REPORT: CPT | Mod: ,,, | Performed by: INTERNAL MEDICINE

## 2023-03-20 PROCEDURE — 37000008 HC ANESTHESIA 1ST 15 MINUTES: Performed by: SURGERY

## 2023-03-20 PROCEDURE — D9220A PRA ANESTHESIA: ICD-10-PCS | Mod: ANES,,, | Performed by: ANESTHESIOLOGY

## 2023-03-20 PROCEDURE — 25000003 PHARM REV CODE 250: Performed by: NURSE ANESTHETIST, CERTIFIED REGISTERED

## 2023-03-20 PROCEDURE — 93005 ELECTROCARDIOGRAM TRACING: CPT | Mod: 59

## 2023-03-20 PROCEDURE — D9220A PRA ANESTHESIA: Mod: ANES,,, | Performed by: ANESTHESIOLOGY

## 2023-03-20 PROCEDURE — 37000009 HC ANESTHESIA EA ADD 15 MINS: Performed by: SURGERY

## 2023-03-20 RX ORDER — HYDROCODONE BITARTRATE AND ACETAMINOPHEN 7.5; 325 MG/1; MG/1
1 TABLET ORAL EVERY 6 HOURS PRN
Qty: 15 TABLET | Refills: 0 | Status: SHIPPED | OUTPATIENT
Start: 2023-03-20

## 2023-03-20 RX ORDER — DIPHENHYDRAMINE HYDROCHLORIDE 50 MG/ML
25 INJECTION INTRAMUSCULAR; INTRAVENOUS EVERY 6 HOURS PRN
Status: DISCONTINUED | OUTPATIENT
Start: 2023-03-20 | End: 2023-03-20 | Stop reason: HOSPADM

## 2023-03-20 RX ORDER — ONDANSETRON 2 MG/ML
INJECTION INTRAMUSCULAR; INTRAVENOUS
Status: DISCONTINUED | OUTPATIENT
Start: 2023-03-20 | End: 2023-03-20

## 2023-03-20 RX ORDER — FENTANYL CITRATE 50 UG/ML
INJECTION, SOLUTION INTRAMUSCULAR; INTRAVENOUS
Status: DISCONTINUED | OUTPATIENT
Start: 2023-03-20 | End: 2023-03-20

## 2023-03-20 RX ORDER — HYDRALAZINE HYDROCHLORIDE 20 MG/ML
10 INJECTION INTRAMUSCULAR; INTRAVENOUS ONCE
Status: COMPLETED | OUTPATIENT
Start: 2023-03-20 | End: 2023-03-20

## 2023-03-20 RX ORDER — LIDOCAINE HYDROCHLORIDE 20 MG/ML
INJECTION INTRAVENOUS
Status: DISCONTINUED | OUTPATIENT
Start: 2023-03-20 | End: 2023-03-20

## 2023-03-20 RX ORDER — ONDANSETRON 2 MG/ML
4 INJECTION INTRAMUSCULAR; INTRAVENOUS DAILY PRN
Status: DISCONTINUED | OUTPATIENT
Start: 2023-03-20 | End: 2023-03-20 | Stop reason: HOSPADM

## 2023-03-20 RX ORDER — MORPHINE SULFATE 10 MG/ML
4 INJECTION INTRAMUSCULAR; INTRAVENOUS; SUBCUTANEOUS EVERY 5 MIN PRN
Status: DISCONTINUED | OUTPATIENT
Start: 2023-03-20 | End: 2023-03-20 | Stop reason: HOSPADM

## 2023-03-20 RX ORDER — PROPOFOL 10 MG/ML
VIAL (ML) INTRAVENOUS
Status: DISCONTINUED | OUTPATIENT
Start: 2023-03-20 | End: 2023-03-20

## 2023-03-20 RX ORDER — MEPERIDINE HYDROCHLORIDE 25 MG/ML
25 INJECTION INTRAMUSCULAR; INTRAVENOUS; SUBCUTANEOUS ONCE AS NEEDED
Status: DISCONTINUED | OUTPATIENT
Start: 2023-03-20 | End: 2023-03-20 | Stop reason: HOSPADM

## 2023-03-20 RX ORDER — CEFAZOLIN SODIUM 1 G/3ML
INJECTION, POWDER, FOR SOLUTION INTRAMUSCULAR; INTRAVENOUS
Status: DISCONTINUED | OUTPATIENT
Start: 2023-03-20 | End: 2023-03-20

## 2023-03-20 RX ORDER — PHENYLEPHRINE HYDROCHLORIDE 10 MG/ML
INJECTION INTRAVENOUS
Status: DISCONTINUED | OUTPATIENT
Start: 2023-03-20 | End: 2023-03-20

## 2023-03-20 RX ORDER — SODIUM CHLORIDE 9 MG/ML
INJECTION, SOLUTION INTRAVENOUS CONTINUOUS
Status: DISCONTINUED | OUTPATIENT
Start: 2023-03-20 | End: 2023-03-20 | Stop reason: HOSPADM

## 2023-03-20 RX ORDER — HYDROMORPHONE HYDROCHLORIDE 2 MG/ML
0.5 INJECTION, SOLUTION INTRAMUSCULAR; INTRAVENOUS; SUBCUTANEOUS EVERY 5 MIN PRN
Status: DISCONTINUED | OUTPATIENT
Start: 2023-03-20 | End: 2023-03-20 | Stop reason: HOSPADM

## 2023-03-20 RX ADMIN — FENTANYL CITRATE 25 MCG: 50 INJECTION INTRAMUSCULAR; INTRAVENOUS at 02:03

## 2023-03-20 RX ADMIN — ONDANSETRON 4 MG: 2 INJECTION INTRAMUSCULAR; INTRAVENOUS at 02:03

## 2023-03-20 RX ADMIN — CEFAZOLIN 2 G: 1 INJECTION, POWDER, FOR SOLUTION INTRAMUSCULAR; INTRAVENOUS; PARENTERAL at 12:03

## 2023-03-20 RX ADMIN — LIDOCAINE HYDROCHLORIDE 50 MG: 20 INJECTION, SOLUTION INTRAVENOUS at 12:03

## 2023-03-20 RX ADMIN — PHENYLEPHRINE HYDROCHLORIDE 200 MCG: 10 INJECTION INTRAVENOUS at 02:03

## 2023-03-20 RX ADMIN — PROPOFOL 150 MG: 10 INJECTION, EMULSION INTRAVENOUS at 12:03

## 2023-03-20 RX ADMIN — MORPHINE SULFATE 4 MG: 10 INJECTION, SOLUTION INTRAMUSCULAR; INTRAVENOUS at 03:03

## 2023-03-20 RX ADMIN — SODIUM CHLORIDE: 9 INJECTION, SOLUTION INTRAVENOUS at 12:03

## 2023-03-20 RX ADMIN — PHENYLEPHRINE HYDROCHLORIDE 200 MCG: 10 INJECTION INTRAVENOUS at 01:03

## 2023-03-20 RX ADMIN — HYDRALAZINE HYDROCHLORIDE 10 MG: 20 INJECTION INTRAMUSCULAR; INTRAVENOUS at 03:03

## 2023-03-20 NOTE — PLAN OF CARE
1435   pt to pacu pt resp reg and non labored pt dsg d/I to r inner arm, poss thrill noted over site  pt sleeping well sao2 100% on 7l fm, will monitor    1510 pt b/p 180s systolic dr atkins stated to give apresoline 10mg ivp will monitor no orders or note in computer dr thayer texted will monitor     1530 pt c/o pain r arm pain level 4 gave morphine 4mg ivp will titrate for pain control

## 2023-03-20 NOTE — ANESTHESIA POSTPROCEDURE EVALUATION
Anesthesia Post Evaluation    Patient: Pardeep Collins    Procedure(s) Performed: Procedure(s) (LRB):  CREATION, AV FISTULA (Right)    Final Anesthesia Type: general      Patient location during evaluation: PACU  Patient participation: Yes- Able to Participate  Level of consciousness: awake and alert and oriented  Post-procedure vital signs: reviewed and stable  Pain management: adequate  Airway patency: patent  GILADRDO mitigation strategies: Multimodal analgesia  PONV status at discharge: No PONV  Anesthetic complications: no      Cardiovascular status: hemodynamically stable and hypertensive (HTN treated with IV hydralazine)  Respiratory status: unassisted and spontaneous ventilation  Hydration status: euvolemic  Follow-up not needed.          Vitals Value Taken Time   /96 03/20/23 1450   Temp 36.4 °C (97.6 °F) 03/20/23 1445   Pulse 78 03/20/23 1453   Resp 11 03/20/23 1453   SpO2 100 % 03/20/23 1453   Vitals shown include unvalidated device data.      No case tracking events are documented in the log.      Pain/Jasmin Score: Jasmin Score: 8 (3/20/2023  2:35 PM)

## 2023-03-20 NOTE — TRANSFER OF CARE
Anesthesia Transfer of Care Note    Patient: Pardeep Collins    Procedure(s) Performed: Procedure(s) (LRB):  CREATION, AV FISTULA (Right)    Patient location: PACU    Anesthesia Type: general    Transport from OR: Transported from OR on room air with adequate spontaneous ventilation    Post pain: adequate analgesia    Post assessment: no apparent anesthetic complications    Post vital signs: stable    Level of consciousness: responds to stimulation    Nausea/Vomiting: no nausea/vomiting    Complications: none    Transfer of care protocol was followed      Last vitals:   Visit Vitals  BP (!) 185/93 (BP Location: Left arm, Patient Position: Lying)   Pulse 72   Temp 36.4 °C (97.6 °F) (Oral)   Resp (!) 7   SpO2 100%

## 2023-03-20 NOTE — ANESTHESIA PREPROCEDURE EVALUATION
03/20/2023  Pardeep Collins is a 82 y.o., male.      Pre-op Assessment    I have reviewed the Patient Summary Reports.    I have reviewed the NPO Status.   I have reviewed the Medications.     Review of Systems  Anesthesia Hx:  No problems with previous Anesthesia  Denies Family Hx of Anesthesia complications.   Denies Personal Hx of Anesthesia complications.   Social:  Non-Smoker, No Alcohol Use    Hematology/Oncology:         -- Anemia:   Cardiovascular:   Hypertension ECG has been reviewed.    Renal/:   Chronic Renal Disease, ESRD Hypertensive nephropathy with worsening renal function - preparing for HD   Musculoskeletal:   Arthritis         Physical Exam  General: Well nourished, Cooperative and Alert    Airway:  Mallampati: II   Mouth Opening: Normal  TM Distance: Normal  Tongue: Normal  Neck ROM: Normal ROM    Dental:  Intact    Chest/Lungs:  Clear to auscultation, Normal Respiratory Rate    Heart:  Rate: Normal  Rhythm: Regular Rhythm        Chemistry        Component Value Date/Time     03/20/2023 0755    K 4.2 03/20/2023 0755     (H) 03/20/2023 0755    CO2 18 (L) 03/20/2023 0755    BUN 52 (H) 03/20/2023 0755    CREATININE 6.90 (H) 03/20/2023 0755     (H) 03/20/2023 0755        Component Value Date/Time    CALCIUM 9.3 03/20/2023 0755        Lab Results   Component Value Date    WBC 5.19 03/20/2023    HGB 8.2 (L) 03/20/2023    HCT 25.5 (L) 03/20/2023     (L) 03/20/2023     EKG - pending official read    Anesthesia Plan  Type of Anesthesia, risks & benefits discussed:    Anesthesia Type: Gen Supraglottic Airway  Intra-op Monitoring Plan: Standard ASA Monitors  Post Op Pain Control Plan: multimodal analgesia  Induction:  IV  Airway Plan: Direct, Post-Induction  Informed Consent: Informed consent signed with the Patient and all parties understand the risks and agree with  anesthesia plan.  All questions answered.   ASA Score: 4  Day of Surgery Review of History & Physical: H&P Update referred to the surgeon/provider.I have interviewed and examined the patient. I have reviewed the patient's H&P dated: There are no significant changes.     Ready For Surgery From Anesthesia Perspective.     .

## 2023-03-20 NOTE — PLAN OF CARE
1530  pt vs stable pt resting well will monitor orders and not in computer will monitor     1545 pt vs stable pt states pain better pt pain level 2 op site to r arm stable orders to release to room per md    1555 pt released to s karo rn b/p 179/74 pulse73 resp 16 sao2 99% on ra

## 2023-03-20 NOTE — DISCHARGE SUMMARY
[facility]  Discharge Note  Short Stay    Procedure(s) (LRB):  CREATION, AV FISTULA (Right)      OUTCOME: Patient tolerated treatment/procedure well without complication and is now ready for discharge.    DISPOSITION: Home or Self Care    FINAL DIAGNOSIS:  chronic kidney disease  FOLLOWUP: In clinic    DISCHARGE INSTRUCTIONS:    Discharge Procedure Orders   Diet Adult Regular     Remove dressing in 24 hours     Notify your health care provider if you experience any of the following:  temperature >100.4     Notify your health care provider if you experience any of the following:  persistent nausea and vomiting or diarrhea     Notify your health care provider if you experience any of the following:  severe uncontrolled pain     Notify your health care provider if you experience any of the following:  redness, tenderness, or signs of infection (pain, swelling, redness, odor or green/yellow discharge around incision site)     Notify your health care provider if you experience any of the following:  difficulty breathing or increased cough     Notify your health care provider if you experience any of the following:  severe persistent headache     Notify your health care provider if you experience any of the following:  worsening rash     Notify your health care provider if you experience any of the following:  persistent dizziness, light-headedness, or visual disturbances     Notify your health care provider if you experience any of the following:  increased confusion or weakness     Notify your health care provider if you experience any of the following:     Shower on day dressing removed (No bath)         Clinical Reference Documents Added to Patient Instructions         Document    ARTERIOVENOUS FISTULA FOR DIALYSIS DISCHARGE INSTRUCTIONS (ENGLISH)            TIME SPENT ON DISCHARGE: 10 minutes

## 2023-03-20 NOTE — OP NOTE
[facility]  Surgery Department  Operative Note    SUMMARY     Date of Procedure: 3/20/2023     Procedure: Procedure(s) (LRB):  CREATION, AV FISTULA (Right)     Surgeon(s) and Role:     * lAfred Sierra MD - Primary    Assisting Surgeon: None    Pre-Operative Diagnosis: ESRD (end stage renal disease) [N18.6]    Post-Operative Diagnosis: Post-Op Diagnosis Codes:     * ESRD (end stage renal disease) [N18.6]    Anesthesia: General/MAC    Procedures Performed: right arm basilic vein transposition    Significant Findings of the Procedure: great sized basilic vein    Procedure in Detail:  After informed consent patient was brought to the OR prepped and draped in the usual sterile fashion. The right arm was scanned with an ultrasound in the basilic vein was identified and found to be adequate size for fistulization. This was marked preoperatively and we did a longitudinal incision in the lower part of the upper arm and began dissecting out the basilic vein. We dissected out the vein from just above the elbow proximally all the way to the junction with the brachial vein. The cutaneous nerve was protected throughout its course parallel to the vein. Side branches were ligated with 3-0 silk ties and clips.  There were perforators double tied/clipped on both ends. Once we got a good length of the vein we then isolated out the brachial artery just above the elbow. The bicipital aponeurosis was opened and we got proximal distal control of approximate 2 cm length of the brachial artery. We felt this be a good length for attachment for fistula and we then distally transected the basilic vein and tied it off with 0 silk tie. We flushed heparinized saline proximally. We then open upper artery about a 1 cm opening and flushed proximally and distally with heparinized saline. Using a 5-0 Prolene suture we did a heel-to-toe anastomosis circumferentially and before completing the anastomosis we flushed out any  air.  We then completed the anastomosis and using a Doppler we tested the proximal and distal aspects and they all had good pulsatile flow and a good thrill in the fistula.  We then created a superficial tunnel for the fistula to be easily stuck just below the skin.  By creating this flat we tucked the fistula underneath the skin and then closed with a 3-0 Vicryl.  The deep layer was also closed with a 3-0 Vicryl and a 4-0 Monocryl was used in subcuticular manner. Area was dressed and patient tolerated the procedure well.      Complications: No    Estimated Blood Loss (EBL): 25 cc           Implants: * No implants in log *    Specimens:   Specimen (24h ago, onward)      None                    Condition: Good    Disposition: PACU - hemodynamically stable.    Attestation: I was present and scrubbed for the entire procedure.

## 2023-04-02 ENCOUNTER — HOSPITAL ENCOUNTER (INPATIENT)
Facility: HOSPITAL | Age: 83
LOS: 2 days | Discharge: HOME HEALTH | DRG: 280 | End: 2023-04-04
Attending: EMERGENCY MEDICINE | Admitting: HOSPITALIST
Payer: MEDICARE

## 2023-04-02 DIAGNOSIS — N40.0 BENIGN PROSTATIC HYPERPLASIA, UNSPECIFIED WHETHER LOWER URINARY TRACT SYMPTOMS PRESENT: ICD-10-CM

## 2023-04-02 DIAGNOSIS — I50.9 HEART FAILURE: ICD-10-CM

## 2023-04-02 DIAGNOSIS — I10 PRIMARY HYPERTENSION: ICD-10-CM

## 2023-04-02 DIAGNOSIS — I21.4 NSTEMI (NON-ST ELEVATED MYOCARDIAL INFARCTION): ICD-10-CM

## 2023-04-02 DIAGNOSIS — R35.1 NOCTURIA: ICD-10-CM

## 2023-04-02 DIAGNOSIS — N18.6 ESRD (END STAGE RENAL DISEASE): ICD-10-CM

## 2023-04-02 DIAGNOSIS — R06.00 DYSPNEA: Primary | ICD-10-CM

## 2023-04-02 DIAGNOSIS — I16.0 HYPERTENSIVE URGENCY: ICD-10-CM

## 2023-04-02 DIAGNOSIS — I21.A1 TYPE 2 MI (MYOCARDIAL INFARCTION): ICD-10-CM

## 2023-04-02 DIAGNOSIS — R06.02 SHORTNESS OF BREATH: ICD-10-CM

## 2023-04-02 DIAGNOSIS — I50.9 CONGESTIVE HEART FAILURE, UNSPECIFIED HF CHRONICITY, UNSPECIFIED HEART FAILURE TYPE: ICD-10-CM

## 2023-04-02 DIAGNOSIS — D64.9 ANEMIA, UNSPECIFIED TYPE: ICD-10-CM

## 2023-04-02 LAB
ALBUMIN SERPL BCP-MCNC: 3 G/DL (ref 3.5–5)
ALBUMIN/GLOB SERPL: 0.8 {RATIO}
ALP SERPL-CCNC: 74 U/L (ref 45–115)
ALT SERPL W P-5'-P-CCNC: 14 U/L (ref 16–61)
ANION GAP SERPL CALCULATED.3IONS-SCNC: 18 MMOL/L (ref 7–16)
APTT PPP: 24.4 SECONDS (ref 25.2–37.3)
AST SERPL W P-5'-P-CCNC: 14 U/L (ref 15–37)
BASOPHILS # BLD AUTO: 0.02 K/UL (ref 0–0.2)
BASOPHILS NFR BLD AUTO: 0.3 % (ref 0–1)
BILIRUB SERPL-MCNC: 0.3 MG/DL (ref ?–1.2)
BUN SERPL-MCNC: 60 MG/DL (ref 7–18)
BUN/CREAT SERPL: 8 (ref 6–20)
CALCIUM SERPL-MCNC: 8.8 MG/DL (ref 8.5–10.1)
CHLORIDE SERPL-SCNC: 107 MMOL/L (ref 98–107)
CO2 SERPL-SCNC: 19 MMOL/L (ref 21–32)
CREAT SERPL-MCNC: 7.43 MG/DL (ref 0.7–1.3)
DIFFERENTIAL METHOD BLD: ABNORMAL
EGFR (NO RACE VARIABLE) (RUSH/TITUS): 7 ML/MIN/1.73M²
EOSINOPHIL # BLD AUTO: 0.2 K/UL (ref 0–0.5)
EOSINOPHIL NFR BLD AUTO: 3 % (ref 1–4)
ERYTHROCYTE [DISTWIDTH] IN BLOOD BY AUTOMATED COUNT: 12 % (ref 11.5–14.5)
GLOBULIN SER-MCNC: 3.9 G/DL (ref 2–4)
GLUCOSE SERPL-MCNC: 123 MG/DL (ref 74–106)
HCT VFR BLD AUTO: 26.7 % (ref 40–54)
HGB BLD-MCNC: 8.2 G/DL (ref 13.5–18)
IMM GRANULOCYTES # BLD AUTO: 0.02 K/UL (ref 0–0.04)
IMM GRANULOCYTES NFR BLD: 0.3 % (ref 0–0.4)
INR BLD: 1.14
LYMPHOCYTES # BLD AUTO: 1.55 K/UL (ref 1–4.8)
LYMPHOCYTES NFR BLD AUTO: 23 % (ref 27–41)
MAGNESIUM SERPL-MCNC: 3.3 MG/DL (ref 1.7–2.3)
MCH RBC QN AUTO: 30.6 PG (ref 27–31)
MCHC RBC AUTO-ENTMCNC: 30.7 G/DL (ref 32–36)
MCV RBC AUTO: 99.6 FL (ref 80–96)
MONOCYTES # BLD AUTO: 0.34 K/UL (ref 0–0.8)
MONOCYTES NFR BLD AUTO: 5.1 % (ref 2–6)
MPC BLD CALC-MCNC: 13.4 FL (ref 9.4–12.4)
NEUTROPHILS # BLD AUTO: 4.6 K/UL (ref 1.8–7.7)
NEUTROPHILS NFR BLD AUTO: 68.3 % (ref 53–65)
NRBC # BLD AUTO: 0 X10E3/UL
NRBC, AUTO (.00): 0 %
NT-PROBNP SERPL-MCNC: ABNORMAL PG/ML (ref 1–450)
PLATELET # BLD AUTO: 159 K/UL (ref 150–400)
POTASSIUM SERPL-SCNC: 5 MMOL/L (ref 3.5–5.1)
PROT SERPL-MCNC: 6.9 G/DL (ref 6.4–8.2)
PROTHROMBIN TIME: 14.2 SECONDS (ref 11.7–14.7)
RBC # BLD AUTO: 2.68 M/UL (ref 4.6–6.2)
SODIUM SERPL-SCNC: 139 MMOL/L (ref 136–145)
TROPONIN I SERPL HS-MCNC: 244.8 PG/ML
TROPONIN I SERPL HS-MCNC: 250.2 PG/ML
WBC # BLD AUTO: 6.73 K/UL (ref 4.5–11)

## 2023-04-02 PROCEDURE — 25000003 PHARM REV CODE 250: Performed by: HOSPITALIST

## 2023-04-02 PROCEDURE — 84484 ASSAY OF TROPONIN QUANT: CPT | Performed by: EMERGENCY MEDICINE

## 2023-04-02 PROCEDURE — 85730 THROMBOPLASTIN TIME PARTIAL: CPT | Performed by: EMERGENCY MEDICINE

## 2023-04-02 PROCEDURE — 96374 THER/PROPH/DIAG INJ IV PUSH: CPT

## 2023-04-02 PROCEDURE — 63600175 PHARM REV CODE 636 W HCPCS: Performed by: EMERGENCY MEDICINE

## 2023-04-02 PROCEDURE — 63600175 PHARM REV CODE 636 W HCPCS: Performed by: HOSPITALIST

## 2023-04-02 PROCEDURE — 80053 COMPREHEN METABOLIC PANEL: CPT | Performed by: EMERGENCY MEDICINE

## 2023-04-02 PROCEDURE — 99223 1ST HOSP IP/OBS HIGH 75: CPT | Mod: $0,,, | Performed by: HOSPITALIST

## 2023-04-02 PROCEDURE — 99285 EMERGENCY DEPT VISIT HI MDM: CPT | Mod: 25

## 2023-04-02 PROCEDURE — 85610 PROTHROMBIN TIME: CPT | Performed by: EMERGENCY MEDICINE

## 2023-04-02 PROCEDURE — 25000003 PHARM REV CODE 250: Performed by: EMERGENCY MEDICINE

## 2023-04-02 PROCEDURE — 83880 ASSAY OF NATRIURETIC PEPTIDE: CPT | Performed by: EMERGENCY MEDICINE

## 2023-04-02 PROCEDURE — 93005 ELECTROCARDIOGRAM TRACING: CPT

## 2023-04-02 PROCEDURE — 93010 ELECTROCARDIOGRAM REPORT: CPT | Mod: ,,, | Performed by: STUDENT IN AN ORGANIZED HEALTH CARE EDUCATION/TRAINING PROGRAM

## 2023-04-02 PROCEDURE — 96375 TX/PRO/DX INJ NEW DRUG ADDON: CPT

## 2023-04-02 PROCEDURE — 83735 ASSAY OF MAGNESIUM: CPT | Performed by: EMERGENCY MEDICINE

## 2023-04-02 PROCEDURE — 93010 EKG 12-LEAD: ICD-10-PCS | Mod: ,,, | Performed by: STUDENT IN AN ORGANIZED HEALTH CARE EDUCATION/TRAINING PROGRAM

## 2023-04-02 PROCEDURE — 99285 EMERGENCY DEPT VISIT HI MDM: CPT | Mod: ,,, | Performed by: EMERGENCY MEDICINE

## 2023-04-02 PROCEDURE — 85025 COMPLETE CBC W/AUTO DIFF WBC: CPT | Performed by: EMERGENCY MEDICINE

## 2023-04-02 PROCEDURE — 99285 PR EMERGENCY DEPT VISIT,LEVEL V: ICD-10-PCS | Mod: ,,, | Performed by: EMERGENCY MEDICINE

## 2023-04-02 PROCEDURE — 11000001 HC ACUTE MED/SURG PRIVATE ROOM

## 2023-04-02 PROCEDURE — 99223 PR INITIAL HOSPITAL CARE,LEVL III: ICD-10-PCS | Mod: $0,,, | Performed by: HOSPITALIST

## 2023-04-02 RX ORDER — LANOLIN ALCOHOL/MO/W.PET/CERES
800 CREAM (GRAM) TOPICAL
Status: DISCONTINUED | OUTPATIENT
Start: 2023-04-02 | End: 2023-04-04 | Stop reason: HOSPADM

## 2023-04-02 RX ORDER — ACETAMINOPHEN 325 MG/1
650 TABLET ORAL EVERY 8 HOURS PRN
Status: DISCONTINUED | OUTPATIENT
Start: 2023-04-02 | End: 2023-04-04 | Stop reason: HOSPADM

## 2023-04-02 RX ORDER — HEPARIN SODIUM 5000 [USP'U]/ML
5000 INJECTION, SOLUTION INTRAVENOUS; SUBCUTANEOUS EVERY 12 HOURS
Status: DISCONTINUED | OUTPATIENT
Start: 2023-04-02 | End: 2023-04-04 | Stop reason: HOSPADM

## 2023-04-02 RX ORDER — ASPIRIN 325 MG
325 TABLET ORAL
Status: COMPLETED | OUTPATIENT
Start: 2023-04-02 | End: 2023-04-02

## 2023-04-02 RX ORDER — ACETAMINOPHEN 325 MG/1
650 TABLET ORAL EVERY 4 HOURS PRN
Status: DISCONTINUED | OUTPATIENT
Start: 2023-04-02 | End: 2023-04-04 | Stop reason: HOSPADM

## 2023-04-02 RX ORDER — NALOXONE HCL 0.4 MG/ML
0.02 VIAL (ML) INJECTION
Status: DISCONTINUED | OUTPATIENT
Start: 2023-04-02 | End: 2023-04-04 | Stop reason: HOSPADM

## 2023-04-02 RX ORDER — MAG HYDROX/ALUMINUM HYD/SIMETH 200-200-20
30 SUSPENSION, ORAL (FINAL DOSE FORM) ORAL 4 TIMES DAILY PRN
Status: DISCONTINUED | OUTPATIENT
Start: 2023-04-02 | End: 2023-04-04 | Stop reason: HOSPADM

## 2023-04-02 RX ORDER — SODIUM,POTASSIUM PHOSPHATES 280-250MG
2 POWDER IN PACKET (EA) ORAL
Status: DISCONTINUED | OUTPATIENT
Start: 2023-04-02 | End: 2023-04-04 | Stop reason: HOSPADM

## 2023-04-02 RX ORDER — CLONIDINE HYDROCHLORIDE 0.2 MG/1
0.2 TABLET ORAL EVERY 8 HOURS PRN
Status: DISCONTINUED | OUTPATIENT
Start: 2023-04-02 | End: 2023-04-04 | Stop reason: HOSPADM

## 2023-04-02 RX ORDER — ACETAMINOPHEN 500 MG
1000 TABLET ORAL EVERY 8 HOURS PRN
Status: DISCONTINUED | OUTPATIENT
Start: 2023-04-02 | End: 2023-04-04 | Stop reason: HOSPADM

## 2023-04-02 RX ORDER — NIFEDIPINE 30 MG/1
30 TABLET, EXTENDED RELEASE ORAL DAILY
Status: DISCONTINUED | OUTPATIENT
Start: 2023-04-02 | End: 2023-04-04 | Stop reason: HOSPADM

## 2023-04-02 RX ORDER — HYDRALAZINE HYDROCHLORIDE 25 MG/1
25 TABLET, FILM COATED ORAL EVERY 8 HOURS PRN
Status: DISCONTINUED | OUTPATIENT
Start: 2023-04-02 | End: 2023-04-04 | Stop reason: HOSPADM

## 2023-04-02 RX ORDER — ONDANSETRON 2 MG/ML
4 INJECTION INTRAMUSCULAR; INTRAVENOUS EVERY 8 HOURS PRN
Status: DISCONTINUED | OUTPATIENT
Start: 2023-04-02 | End: 2023-04-04 | Stop reason: HOSPADM

## 2023-04-02 RX ORDER — FUROSEMIDE 10 MG/ML
100 INJECTION INTRAMUSCULAR; INTRAVENOUS
Status: COMPLETED | OUTPATIENT
Start: 2023-04-02 | End: 2023-04-02

## 2023-04-02 RX ORDER — HYDROCODONE BITARTRATE AND ACETAMINOPHEN 7.5; 325 MG/1; MG/1
1 TABLET ORAL EVERY 6 HOURS PRN
Status: DISCONTINUED | OUTPATIENT
Start: 2023-04-02 | End: 2023-04-04 | Stop reason: HOSPADM

## 2023-04-02 RX ORDER — FUROSEMIDE 10 MG/ML
40 INJECTION INTRAMUSCULAR; INTRAVENOUS 2 TIMES DAILY
Status: DISCONTINUED | OUTPATIENT
Start: 2023-04-02 | End: 2023-04-04 | Stop reason: HOSPADM

## 2023-04-02 RX ORDER — POLYETHYLENE GLYCOL 3350 17 G/17G
17 POWDER, FOR SOLUTION ORAL DAILY PRN
Status: DISCONTINUED | OUTPATIENT
Start: 2023-04-02 | End: 2023-04-04 | Stop reason: HOSPADM

## 2023-04-02 RX ORDER — SODIUM CHLORIDE 0.9 % (FLUSH) 0.9 %
10 SYRINGE (ML) INJECTION
Status: DISCONTINUED | OUTPATIENT
Start: 2023-04-02 | End: 2023-04-04 | Stop reason: HOSPADM

## 2023-04-02 RX ORDER — TALC
6 POWDER (GRAM) TOPICAL NIGHTLY PRN
Status: DISCONTINUED | OUTPATIENT
Start: 2023-04-02 | End: 2023-04-04 | Stop reason: HOSPADM

## 2023-04-02 RX ORDER — HYDRALAZINE HYDROCHLORIDE 20 MG/ML
20 INJECTION INTRAMUSCULAR; INTRAVENOUS
Status: COMPLETED | OUTPATIENT
Start: 2023-04-02 | End: 2023-04-02

## 2023-04-02 RX ADMIN — ASPIRIN 325 MG ORAL TABLET 325 MG: 325 PILL ORAL at 09:04

## 2023-04-02 RX ADMIN — FUROSEMIDE 40 MG: 10 INJECTION, SOLUTION INTRAMUSCULAR; INTRAVENOUS at 09:04

## 2023-04-02 RX ADMIN — NIFEDIPINE 30 MG: 30 TABLET, FILM COATED, EXTENDED RELEASE ORAL at 12:04

## 2023-04-02 RX ADMIN — HEPARIN SODIUM 5000 UNITS: 5000 INJECTION, SOLUTION INTRAVENOUS; SUBCUTANEOUS at 09:04

## 2023-04-02 RX ADMIN — FUROSEMIDE 40 MG: 10 INJECTION, SOLUTION INTRAMUSCULAR; INTRAVENOUS at 11:04

## 2023-04-02 RX ADMIN — FUROSEMIDE 100 MG: 10 INJECTION, SOLUTION INTRAMUSCULAR; INTRAVENOUS at 06:04

## 2023-04-02 RX ADMIN — NITROGLYCERIN 1 INCH: 20 OINTMENT TOPICAL at 06:04

## 2023-04-02 RX ADMIN — HYDRALAZINE HYDROCHLORIDE 25 MG: 25 TABLET ORAL at 06:04

## 2023-04-02 RX ADMIN — TICAGRELOR 180 MG: 90 TABLET ORAL at 09:04

## 2023-04-02 RX ADMIN — Medication 6 MG: at 10:04

## 2023-04-02 RX ADMIN — HEPARIN SODIUM 5000 UNITS: 5000 INJECTION, SOLUTION INTRAVENOUS; SUBCUTANEOUS at 11:04

## 2023-04-02 RX ADMIN — HYDRALAZINE HYDROCHLORIDE 20 MG: 20 INJECTION INTRAMUSCULAR; INTRAVENOUS at 05:04

## 2023-04-02 NOTE — ASSESSMENT & PLAN NOTE
Shortness of breath likely related ESRD.   RUE AV graft placed on 3/20/23.   Nephrology consulted.    Followed by Dr. Aceves as outpatient.    Dr. Boykin consulted.

## 2023-04-02 NOTE — ASSESSMENT & PLAN NOTE
Patient has a current diagnosis of hypertensive urgency (without evidence of end organ damage) which is uncontrolled.  Latest blood pressure and vitals reviewed-   Temp:  [97.7 °F (36.5 °C)]   Pulse:  []   Resp:  [17-31]   BP: (143-218)/()   SpO2:  [95 %-100 %] .   Patient currently on IV antihypertensives.   Home meds for hypertension were reviewed and noted below.   Hypertension Medications             NIFEdipine (ADALAT CC) 30 MG TbSR Take 30 mg by mouth once daily.          Medication adjustment for hospital antihypertensives is as follows- prn hydralazine and clonidine.     Will aim for controlled BP reduction by medications noted above. Monitor and mitigate end organ damage as indicated.

## 2023-04-02 NOTE — ED PROVIDER NOTES
Encounter Date: 2023       History     Chief Complaint   Patient presents with    Shortness of Breath     Pt c/o shortness of breath since yesterday, recently had dialysis port placed to left arm but he hasn't started dialysis yet, stated he took a benadryl earlier tonight.      81 Y/O MALE WITH DYSPNEA.  HE HAS NEWLY PLACED DIALYSIS ACCESS, BUT HE HASN'T STARTED DIALYSIS YET.  HE IS COMPLAINING OF SHORTNESS OF BREATH THAT HAS BEEN WORSENING SINCE YESTERDAY.  HE NOTES NO PARTICULAR REMITTING OR EXACERBATING FACTORS.      Review of patient's allergies indicates:  No Known Allergies  Past Medical History:   Diagnosis Date    Elevated PSA     Gout, unspecified     Hypertension     Rheumatoid arthritis, unspecified      Past Surgical History:   Procedure Laterality Date    AV FISTULA PLACEMENT Right 3/20/2023    Procedure: CREATION, AV FISTULA;  Surgeon: Alfred Sierra MD;  Location: [facility];  Service: General;  Laterality: Right;  right basilic vein transposition    HAND SURGERY Left     urolift      in [place];     Family History   Problem Relation Age of Onset    Heart disease Father     Breast cancer Sister      Social History     Tobacco Use    Smoking status: Former     Types: Cigarettes     Quit date:      Years since quittin.2    Smokeless tobacco: Never   Substance Use Topics    Alcohol use: Not Currently     Comment: quit in     Drug use: Never     Review of Systems   All other systems reviewed and are negative.    Physical Exam     Initial Vitals [23 0525]   BP Pulse Resp Temp SpO2   (!) 218/114 (!) 122 18 97.7 °F (36.5 °C) 96 %      MAP       --         Physical Exam    Nursing note and vitals reviewed.  Constitutional: He appears well-developed and well-nourished. He appears distressed.   HENT:   Head: Normocephalic and atraumatic.   Nose: Nose normal.   Mouth/Throat: Oropharynx is clear and moist.   Eyes: Conjunctivae and EOM are normal. Pupils are equal, round, and reactive to  light.   Neck: Neck supple.   Normal range of motion.  Cardiovascular:  Normal rate, normal heart sounds and intact distal pulses.           TACHYCARDIA   Pulmonary/Chest: He has rales.   Abdominal: Abdomen is soft. Bowel sounds are normal.   Musculoskeletal:         General: Normal range of motion.      Cervical back: Normal range of motion and neck supple.     Neurological: He is alert and oriented to person, place, and time. He has normal strength. GCS score is 15. GCS eye subscore is 4. GCS verbal subscore is 5. GCS motor subscore is 6.   Skin: Skin is warm and dry. Capillary refill takes less than 2 seconds.   Psychiatric: He has a normal mood and affect.       Medical Screening Exam   See Full Note    ED Course   Procedures  Labs Reviewed   COMPREHENSIVE METABOLIC PANEL - Abnormal; Notable for the following components:       Result Value    CO2 19 (*)     Anion Gap 18 (*)     Glucose 123 (*)     BUN 60 (*)     Creatinine 7.43 (*)     Albumin 3.0 (*)     ALT 14 (*)     AST 14 (*)     eGFR 7 (*)     All other components within normal limits   NT-PRO NATRIURETIC PEPTIDE - Abnormal; Notable for the following components:    ProBNP 33,742 (*)     All other components within normal limits   APTT - Abnormal; Notable for the following components:    PTT 24.4 (*)     All other components within normal limits   MAGNESIUM - Abnormal; Notable for the following components:    Magnesium 3.3 (*)     All other components within normal limits   TROPONIN I - Abnormal; Notable for the following components:    Troponin I High Sensitivity 244.8 (*)     All other components within normal limits   CBC WITH DIFFERENTIAL - Abnormal; Notable for the following components:    RBC 2.68 (*)     Hemoglobin 8.2 (*)     Hematocrit 26.7 (*)     MCV 99.6 (*)     MCHC 30.7 (*)     MPV 13.4 (*)     Neutrophils % 68.3 (*)     Lymphocytes % 23.0 (*)     All other components within normal limits   TROPONIN I - Abnormal; Notable for the following  components:    Troponin I High Sensitivity 250.2 (*)     All other components within normal limits   PROTIME-INR - Normal   CBC W/ AUTO DIFFERENTIAL    Narrative:     The following orders were created for panel order CBC auto differential.  Procedure                               Abnormality         Status                     ---------                               -----------         ------                     CBC with Differential[[id number]]        Abnormal            Final result                 Please view results for these tests on the individual orders.        ECG Results              EKG 12-lead (In process)  Result time 04/02/23 07:09:38      In process by Interface, Lab In [facility] (04/02/23 07:09:38)                   Narrative:    Test Reason : R06.00,    Vent. Rate : 108 BPM     Atrial Rate : 000 BPM     P-R Int : 186 ms          QRS Dur : 084 ms      QT Int : 314 ms       P-R-T Axes : 074 066 053 degrees     QTc Int : 398 ms     ACUTE MI / ISCHEMIA    SIGNIFICANT ARRHYTHMIA   Sinus tachycardia with frequent multifocal PVCs  Lateral ST-T abnormality suggests myocardial injury/ischemia  Abnormal ECG      Referred By: [email or web address]   SELF           Confirmed By:                                   Imaging Results              X-Ray Chest AP Portable (Final result)  Result time 04/02/23 08:20:03      Final result by Danny Kline II, MD (04/02/23 08:20:03)                   Impression:      Findings suggest mild cardiac decompensation and / or pneumonitis.      Electronically signed by: Danny Kline  Date:    04/02/2023  Time:    08:20               Narrative:    EXAMINATION:  XR CHEST AP PORTABLE    CLINICAL HISTORY:  Dyspnea, unspecified    COMPARISON:  None.    TECHNIQUE:  XR CHEST AP PORTABLE    FINDINGS:  The heart and mediastinum are stable in size and configuration.  The pulmonary vascularity is slightly increased with bilateral increased interstitial lung density.  No other lung infiltrates,  effusions, pneumothorax or other abnormality is demonstrated.                                       Medications   NIFEdipine 24 hr tablet 30 mg (has no administration in time range)   HYDROcodone-acetaminophen 7.5-325 mg per tablet 1 tablet (has no administration in time range)   sodium chloride 0.9% flush 10 mL (has no administration in time range)   melatonin tablet 6 mg (has no administration in time range)   ondansetron injection 4 mg (has no administration in time range)   polyethylene glycol packet 17 g (has no administration in time range)   acetaminophen tablet 650 mg (has no administration in time range)   aluminum-magnesium hydroxide-simethicone 200-200-20 mg/5 mL suspension 30 mL (has no administration in time range)   acetaminophen tablet 650 mg (has no administration in time range)   acetaminophen tablet 1,000 mg (has no administration in time range)   naloxone 0.4 mg/mL injection 0.02 mg (has no administration in time range)   potassium bicarbonate disintegrating tablet 50 mEq (has no administration in time range)   potassium bicarbonate disintegrating tablet 35 mEq (has no administration in time range)   potassium bicarbonate disintegrating tablet 60 mEq (has no administration in time range)   magnesium oxide tablet 800 mg (has no administration in time range)   magnesium oxide tablet 800 mg (has no administration in time range)   potassium, sodium phosphates 280-160-250 mg packet 2 packet (has no administration in time range)   potassium, sodium phosphates 280-160-250 mg packet 2 packet (has no administration in time range)   potassium, sodium phosphates 280-160-250 mg packet 2 packet (has no administration in time range)   dextrose 10% bolus 125 mL 125 mL (has no administration in time range)   dextrose 10% bolus 250 mL 250 mL (has no administration in time range)   heparin (porcine) injection 5,000 Units (has no administration in time range)   hydrALAZINE injection 20 mg (20 mg Intravenous Given  4/2/23 0550)   furosemide injection 100 mg (100 mg Intravenous Given 4/2/23 0611)   nitroGLYCERIN 2% TD oint ointment 1 inch (1 inch Topical (Top) Given 4/2/23 0610)   aspirin tablet 325 mg (325 mg Oral Given 4/2/23 0905)   ticagrelor tablet 180 mg (180 mg Oral Given 4/2/23 0905)                 ED Course as of 04/02/23 1019   Sun Apr 02, 2023   0633 Medical decision-making:  Differential diagnosis includes acute CHF exacerbation, STEMI, NSTEMI, failure, pneumonia, bronchospasm.  All labs and imaging ordered and interpreted by me.  Patient was treated in the emergency department with IV Lasix, hydralazine, and topical nitroglycerin which resulted in improved symptoms.  Magnesium level is high at 3.3  Troponin is high at 244.  I do not have an old troponin in the system to compare to.  [place] [address] I do not have an old BNP in the system to compare to.  CBC shows anemia with hematocrit of 26.7.  This appears to be about baseline when compared to review of outside medical record.  CMP shows renal failure with creatinine of 7.4 which appears to be around baseline when compared to review of outside medical record.  Chest x-ray by my interpretation shows CHF. [BB]   0633 Chest x-ray by [BB]   0807 I made decision to admit patient based on CHF, uncontrolled hypertension, and NSTEMI.  I discussed case with internal medicine hospitalist on-call who agrees with admission, patient will be admitted under care of Dr. Quinonez. [BB]      ED Course User Index  [BB] Joaquim Martinez MD          Clinical Impression:   Final diagnoses:  [R06.00] Dyspnea (Primary)  [I50.9] Congestive heart failure, unspecified HF chronicity, unspecified heart failure type  [I21.4] NSTEMI (non-ST elevated myocardial infarction)        ED Disposition Condition    Admit Stable                Joaquim Martinez MD  04/02/23 1019

## 2023-04-02 NOTE — SUBJECTIVE & OBJECTIVE
Past Medical History:   Diagnosis Date    Elevated PSA     Gout, unspecified     Hypertension     Rheumatoid arthritis, unspecified        Past Surgical History:   Procedure Laterality Date    AV FISTULA PLACEMENT Right 3/20/2023    Procedure: CREATION, AV FISTULA;  Surgeon: Alfred Sierra MD;  Location: [facility];  Service: General;  Laterality: Right;  right basilic vein transposition    HAND SURGERY Left     urolift      in [place];       Review of patient's allergies indicates:  No Known Allergies    No current facility-administered medications on file prior to encounter.     Current Outpatient Medications on File Prior to Encounter   Medication Sig    desmopressin (DDAVP) 0.2 MG tablet Take 1 tablet (200 mcg total) by mouth nightly. (Patient not taking: Reported on 2023)    HYDROcodone-acetaminophen (NORCO) 7.5-325 mg per tablet Take 1 tablet by mouth every 6 (six) hours as needed for Pain.    NIFEdipine (ADALAT CC) 30 MG TbSR Take 30 mg by mouth once daily.     Family History       Problem Relation (Age of Onset)    Breast cancer Sister    Heart disease Father          Tobacco Use    Smoking status: Former     Types: Cigarettes     Quit date:      Years since quittin.2    Smokeless tobacco: Never   Substance and Sexual Activity    Alcohol use: Not Currently     Comment: quit in     Drug use: Never    Sexual activity: Not on file     Review of Systems   Constitutional:  Negative for activity change, chills, fatigue and fever.   HENT:  Negative for congestion and sore throat.    Respiratory:  Positive for shortness of breath and wheezing. Negative for chest tightness.    Gastrointestinal:  Negative for abdominal distention, abdominal pain and diarrhea.   Endocrine: Negative for cold intolerance and heat intolerance.   Genitourinary:  Negative for dysuria.   Musculoskeletal:  Negative for arthralgias and back pain.   Skin:  Negative for color change and rash.   Neurological:  Negative for  dizziness, tremors and headaches.   Psychiatric/Behavioral:  Negative for agitation. The patient is not nervous/anxious.    Objective:     Vital Signs (Most Recent):  Temp: 97.7 °F (36.5 °C) (04/02/23 0525)  Pulse: 89 (04/02/23 0825)  Resp: (!) 24 (04/02/23 0825)  BP: (!) 175/93 (04/02/23 0825)  SpO2: 99 % (04/02/23 0825)   Vital Signs (24h Range):  Temp:  [97.7 °F (36.5 °C)] 97.7 °F (36.5 °C)  Pulse:  [] 89  Resp:  [17-31] 24  SpO2:  [95 %-100 %] 99 %  BP: (143-218)/() 175/93     Weight: 65.8 kg (145 lb)  Body mass index is 22.05 kg/m².    Physical Exam  Vitals and nursing note reviewed.   Constitutional:       Appearance: He is ill-appearing.   HENT:      Head: Normocephalic.      Right Ear: Tympanic membrane normal.      Nose: Nose normal.   Cardiovascular:      Rate and Rhythm: Tachycardia present. Rhythm irregular.      Pulses: Normal pulses.      Heart sounds: Normal heart sounds.   Pulmonary:      Effort: Pulmonary effort is normal.      Breath sounds: Wheezing and rales present.   Abdominal:      General: Abdomen is flat. Bowel sounds are normal.      Palpations: Abdomen is soft.   Musculoskeletal:         General: No swelling.      Cervical back: Normal range of motion.      Right lower leg: No edema.      Left lower leg: No edema.   Skin:     General: Skin is warm and dry.   Neurological:      General: No focal deficit present.      Mental Status: He is alert and oriented to person, place, and time.   Psychiatric:         Mood and Affect: Mood normal.           Significant Labs: All pertinent labs within the past 24 hours have been reviewed.    Significant Imaging: I have reviewed all pertinent imaging results/findings within the past 24 hours.

## 2023-04-02 NOTE — H&P
[facility] - Emergency Department  Hospital Medicine  History & Physical    Patient Name: Pardeep Collins  MRN: [id number]  Patient Class: IP- Inpatient  Admission Date: 4/2/2023  Attending Physician: Nely Quinonez MD   Primary Care Provider: Primary Doctor No         Patient information was obtained from patient, past medical records and ER records.     Subjective:     Principal Problem:ESRD (end stage renal disease)    Chief Complaint:   Chief Complaint   Patient presents with    Shortness of Breath     Pt c/o shortness of breath since yesterday, recently had dialysis port placed to left arm but he hasn't started dialysis yet, stated he took a benadryl earlier tonight.         HPI: Mr. Collins is an 83yo man history of rheumatoid arthritis with bilateral knee and left shoulder pain, HTN, BPH, nocturia s/p UroLift, Stage 5 kidney disease s/p R AV fistula on 3/20/23 with nephrologist Dr. Aceves but missed several appointments who presents with shortness of breath.  He reports progressive shortness of breath over the last two days.  He denies leg swelling, chest pain, R arm pain or other complaints.      He follows at the cardiology institute [facility] and receives care at [facility].  PCP is Parker Mccrary.   He states that he had  a LHC and echo within the last year.  This was performed after he had severe pain in his left shoulder.  The pain is intermittent (once every couple months) but it is not as severe as it once was.    He states he had COVID-19 at the beginning of the pandemic a LAIRD and has had intermittent pain since then.  He also states he fell off a tree 1-2 years ago and has some persistent pain related to that.  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      At baseline, he walks.  He states he has pain in both knees and that compromises his ability to walk.  He is afraid to have surgery given his multiple other medical problems.  He has never  taken medication for rheumatoid arthritis.  He has never seen a rheumatologist.  He states a chiropractor told him he has rheumatoid arthritis.  He denies stiffness in his hands in the mornings.           He states he no longer drives so it is quite costly for him to come to appointments. This is the reason he has missed some appointments.          Past Medical History:   Diagnosis Date    Elevated PSA     Gout, unspecified     Hypertension     Rheumatoid arthritis, unspecified        Past Surgical History:   Procedure Laterality Date    AV FISTULA PLACEMENT Right 3/20/2023    Procedure: CREATION, AV FISTULA;  Surgeon: Alfred Sierra MD;  Location: [facility];  Service: General;  Laterality: Right;  right basilic vein transposition    HAND SURGERY Left     urolift      in [place];       Review of patient's allergies indicates:  No Known Allergies    No current facility-administered medications on file prior to encounter.     Current Outpatient Medications on File Prior to Encounter   Medication Sig    desmopressin (DDAVP) 0.2 MG tablet Take 1 tablet (200 mcg total) by mouth nightly. (Patient not taking: Reported on 2023)    HYDROcodone-acetaminophen (NORCO) 7.5-325 mg per tablet Take 1 tablet by mouth every 6 (six) hours as needed for Pain.    NIFEdipine (ADALAT CC) 30 MG TbSR Take 30 mg by mouth once daily.     Family History       Problem Relation (Age of Onset)    Breast cancer Sister    Heart disease Father          Tobacco Use    Smoking status: Former     Types: Cigarettes     Quit date:      Years since quittin.2    Smokeless tobacco: Never   Substance and Sexual Activity    Alcohol use: Not Currently     Comment: quit in     Drug use: Never    Sexual activity: Not on file     Review of Systems   Constitutional:  Negative for activity change, chills, fatigue and fever.   HENT:  Negative for congestion and sore throat.    Respiratory:  Positive for shortness of breath and  wheezing. Negative for chest tightness.    Gastrointestinal:  Negative for abdominal distention, abdominal pain and diarrhea.   Endocrine: Negative for cold intolerance and heat intolerance.   Genitourinary:  Negative for dysuria.   Musculoskeletal:  Negative for arthralgias and back pain.   Skin:  Negative for color change and rash.   Neurological:  Negative for dizziness, tremors and headaches.   Psychiatric/Behavioral:  Negative for agitation. The patient is not nervous/anxious.    Objective:     Vital Signs (Most Recent):  Temp: 97.7 °F (36.5 °C) (04/02/23 0525)  Pulse: 89 (04/02/23 0825)  Resp: (!) 24 (04/02/23 0825)  BP: (!) 175/93 (04/02/23 0825)  SpO2: 99 % (04/02/23 0825)   Vital Signs (24h Range):  Temp:  [97.7 °F (36.5 °C)] 97.7 °F (36.5 °C)  Pulse:  [] 89  Resp:  [17-31] 24  SpO2:  [95 %-100 %] 99 %  BP: (143-218)/() 175/93     Weight: 65.8 kg (145 lb)  Body mass index is 22.05 kg/m².    Physical Exam  Vitals and nursing note reviewed.   Constitutional:       Appearance: He is ill-appearing.   HENT:      Head: Normocephalic.      Right Ear: Tympanic membrane normal.      Nose: Nose normal.   Cardiovascular:      Rate and Rhythm: Tachycardia present. Rhythm irregular.      Pulses: Normal pulses.      Heart sounds: Normal heart sounds.   Pulmonary:      Effort: Pulmonary effort is normal.      Breath sounds: Wheezing and rales present.   Abdominal:      General: Abdomen is flat. Bowel sounds are normal.      Palpations: Abdomen is soft.   Musculoskeletal:         General: No swelling.      Cervical back: Normal range of motion.      Right lower leg: No edema.      Left lower leg: No edema.   Skin:     General: Skin is warm and dry.   Neurological:      General: No focal deficit present.      Mental Status: He is alert and oriented to person, place, and time.   Psychiatric:         Mood and Affect: Mood normal.           Significant Labs: All pertinent labs within the past 24 hours have been  reviewed.    Significant Imaging: I have reviewed all pertinent imaging results/findings within the past 24 hours.    Assessment/Plan:     * ESRD (end stage renal disease)  Shortness of breath likely related ESRD.   RUE AV graft placed on 3/20/23.   Nephrology consulted.    Followed by Dr. Aceves as outpatient.    Dr. Boykin consulted.        Type 2 MI (myocardial infarction)  Troponin 250s.    Shortness of breath and mild chest discomfort.   Negative cath at cardiac institute in the last year, per patient.        Hypertensive urgency  Patient has a current diagnosis of hypertensive urgency (without evidence of end organ damage) which is uncontrolled.  Latest blood pressure and vitals reviewed-   Temp:  [97.7 °F (36.5 °C)]   Pulse:  []   Resp:  [17-31]   BP: (143-218)/()   SpO2:  [95 %-100 %] .   Patient currently on IV antihypertensives.   Home meds for hypertension were reviewed and noted below.   Hypertension Medications             NIFEdipine (ADALAT CC) 30 MG TbSR Take 30 mg by mouth once daily.          Medication adjustment for hospital antihypertensives is as follows- prn hydralazine and clonidine.     Will aim for controlled BP reduction by medications noted above. Monitor and mitigate end organ damage as indicated.    Nocturia  H/o UroLift.    Follows with Dr. Haile.   Bladder scan ordered.    Finney catheter if needed.       BPH (benign prostatic hyperplasia)  Elevated PSA in the past.    Monitor for urinary retention.        Primary hypertension  On home nifedipine.          VTE Risk Mitigation (From admission, onward)         Ordered     heparin (porcine) injection 5,000 Units  Every 12 hours         04/02/23 0937     Place sequential compression device  Until discontinued         04/02/23 0937     IP VTE HIGH RISK PATIENT  Once         04/02/23 0937                           Nely Quinonez MD  Department of Hospital Medicine  [facility] - Emergency Department

## 2023-04-02 NOTE — HPI
Mr. Collins is an 83yo man history of rheumatoid arthritis with bilateral knee and left shoulder pain, HTN, BPH, nocturia s/p UroLift, Stage 5 kidney disease s/p R AV fistula on 3/20/23 with nephrologist Dr. Aceves but missed several appointments who presents with shortness of breath.  He reports progressive shortness of breath over the last two days.  He denies leg swelling, chest pain, R arm pain or other complaints.      He follows at the cardiology institute [facility] and receives care at [facility].  PCP is Parker Mccrary.   He states that he had  a LHC and echo within the last year.  This was performed after he had severe pain in his left shoulder.  The pain is intermittent (once every couple months) but it is not as severe as it once was.    He states he had COVID-19 at the beginning of the pandemic a LAIRD and has had intermittent pain since then.  He also states he fell off a tree 1-2 years ago and has some persistent pain related to that.  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      At baseline, he walks.  He states he has pain in both knees and that compromises his ability to walk.  He is afraid to have surgery given his multiple other medical problems.  He has never taken medication for rheumatoid arthritis.  He has never seen a rheumatologist.  He states a chiropractor told him he has rheumatoid arthritis.  He denies stiffness in his hands in the mornings.           He states he no longer drives so it is quite costly for him to come to appointments. This is the reason he has missed some appointments.

## 2023-04-02 NOTE — ASSESSMENT & PLAN NOTE
Troponin 250s.    Shortness of breath and mild chest discomfort.   Negative cath at cardiac institute in the last year, per patient.

## 2023-04-03 PROBLEM — R06.02 SHORTNESS OF BREATH: Status: ACTIVE | Noted: 2023-04-03

## 2023-04-03 PROBLEM — D64.9 ANEMIA: Status: ACTIVE | Noted: 2023-04-03

## 2023-04-03 LAB
ANION GAP SERPL CALCULATED.3IONS-SCNC: 17 MMOL/L (ref 7–16)
BASOPHILS # BLD AUTO: 0.03 K/UL (ref 0–0.2)
BASOPHILS NFR BLD AUTO: 0.6 % (ref 0–1)
BUN SERPL-MCNC: 63 MG/DL (ref 7–18)
BUN/CREAT SERPL: 8 (ref 6–20)
CALCIUM SERPL-MCNC: 9 MG/DL (ref 8.5–10.1)
CHLORIDE SERPL-SCNC: 106 MMOL/L (ref 98–107)
CO2 SERPL-SCNC: 20 MMOL/L (ref 21–32)
CREAT SERPL-MCNC: 7.48 MG/DL (ref 0.7–1.3)
DIFFERENTIAL METHOD BLD: ABNORMAL
EGFR (NO RACE VARIABLE) (RUSH/TITUS): 7 ML/MIN/1.73M²
EOSINOPHIL # BLD AUTO: 0.15 K/UL (ref 0–0.5)
EOSINOPHIL NFR BLD AUTO: 2.9 % (ref 1–4)
ERYTHROCYTE [DISTWIDTH] IN BLOOD BY AUTOMATED COUNT: 12.1 % (ref 11.5–14.5)
GLUCOSE SERPL-MCNC: 116 MG/DL (ref 74–106)
HCT VFR BLD AUTO: 25.9 % (ref 40–54)
HGB BLD-MCNC: 8.1 G/DL (ref 13.5–18)
IMM GRANULOCYTES # BLD AUTO: 0.01 K/UL (ref 0–0.04)
IMM GRANULOCYTES NFR BLD: 0.2 % (ref 0–0.4)
LYMPHOCYTES # BLD AUTO: 1.15 K/UL (ref 1–4.8)
LYMPHOCYTES NFR BLD AUTO: 22.2 % (ref 27–41)
MAGNESIUM SERPL-MCNC: 3.2 MG/DL (ref 1.7–2.3)
MCH RBC QN AUTO: 31.5 PG (ref 27–31)
MCHC RBC AUTO-ENTMCNC: 31.3 G/DL (ref 32–36)
MCV RBC AUTO: 100.8 FL (ref 80–96)
MONOCYTES # BLD AUTO: 0.39 K/UL (ref 0–0.8)
MONOCYTES NFR BLD AUTO: 7.5 % (ref 2–6)
MPC BLD CALC-MCNC: 13 FL (ref 9.4–12.4)
NEUTROPHILS # BLD AUTO: 3.44 K/UL (ref 1.8–7.7)
NEUTROPHILS NFR BLD AUTO: 66.6 % (ref 53–65)
NRBC # BLD AUTO: 0 X10E3/UL
NRBC, AUTO (.00): 0 %
PLATELET # BLD AUTO: 147 K/UL (ref 150–400)
POTASSIUM SERPL-SCNC: 4.3 MMOL/L (ref 3.5–5.1)
RBC # BLD AUTO: 2.57 M/UL (ref 4.6–6.2)
SODIUM SERPL-SCNC: 139 MMOL/L (ref 136–145)
WBC # BLD AUTO: 5.17 K/UL (ref 4.5–11)

## 2023-04-03 PROCEDURE — 63600175 PHARM REV CODE 636 W HCPCS: Performed by: HOSPITALIST

## 2023-04-03 PROCEDURE — 11000001 HC ACUTE MED/SURG PRIVATE ROOM

## 2023-04-03 PROCEDURE — 99233 SBSQ HOSP IP/OBS HIGH 50: CPT | Mod: ,,, | Performed by: HOSPITALIST

## 2023-04-03 PROCEDURE — 80048 BASIC METABOLIC PNL TOTAL CA: CPT | Performed by: HOSPITALIST

## 2023-04-03 PROCEDURE — 25000003 PHARM REV CODE 250: Performed by: INTERNAL MEDICINE

## 2023-04-03 PROCEDURE — 25000003 PHARM REV CODE 250: Performed by: HOSPITALIST

## 2023-04-03 PROCEDURE — 83735 ASSAY OF MAGNESIUM: CPT | Performed by: HOSPITALIST

## 2023-04-03 PROCEDURE — 99233 PR SUBSEQUENT HOSPITAL CARE,LEVL III: ICD-10-PCS | Mod: ,,, | Performed by: HOSPITALIST

## 2023-04-03 PROCEDURE — 85025 COMPLETE CBC W/AUTO DIFF WBC: CPT | Performed by: HOSPITALIST

## 2023-04-03 RX ORDER — SODIUM BICARBONATE 650 MG/1
1300 TABLET ORAL 2 TIMES DAILY
Status: DISCONTINUED | OUTPATIENT
Start: 2023-04-03 | End: 2023-04-04 | Stop reason: HOSPADM

## 2023-04-03 RX ADMIN — Medication 6 MG: at 09:04

## 2023-04-03 RX ADMIN — FUROSEMIDE 40 MG: 10 INJECTION, SOLUTION INTRAMUSCULAR; INTRAVENOUS at 09:04

## 2023-04-03 RX ADMIN — NIFEDIPINE 30 MG: 30 TABLET, FILM COATED, EXTENDED RELEASE ORAL at 08:04

## 2023-04-03 RX ADMIN — HEPARIN SODIUM 5000 UNITS: 5000 INJECTION, SOLUTION INTRAVENOUS; SUBCUTANEOUS at 09:04

## 2023-04-03 RX ADMIN — HYDRALAZINE HYDROCHLORIDE 25 MG: 25 TABLET ORAL at 05:04

## 2023-04-03 RX ADMIN — POLYETHYLENE GLYCOL 3350 17 G: 17 POWDER, FOR SOLUTION ORAL at 04:04

## 2023-04-03 RX ADMIN — HEPARIN SODIUM 5000 UNITS: 5000 INJECTION, SOLUTION INTRAVENOUS; SUBCUTANEOUS at 08:04

## 2023-04-03 RX ADMIN — FUROSEMIDE 40 MG: 10 INJECTION, SOLUTION INTRAMUSCULAR; INTRAVENOUS at 08:04

## 2023-04-03 RX ADMIN — SODIUM BICARBONATE 650 MG TABLET 1300 MG: at 09:04

## 2023-04-03 RX ADMIN — HYDROCODONE BITARTRATE AND ACETAMINOPHEN 1 TABLET: 7.5; 325 TABLET ORAL at 09:04

## 2023-04-03 NOTE — PLAN OF CARE
Problem: Adult Inpatient Plan of Care  Goal: Plan of Care Review  Outcome: Ongoing, Progressing  Goal: Patient-Specific Goal (Individualized)  Outcome: Ongoing, Progressing  Goal: Absence of Hospital-Acquired Illness or Injury  Outcome: Ongoing, Progressing  Goal: Optimal Comfort and Wellbeing  Outcome: Ongoing, Progressing  Goal: Readiness for Transition of Care  Outcome: Ongoing, Progressing     Problem: Hypertension Comorbidity  Goal: Blood Pressure in Desired Range  Outcome: Ongoing, Progressing

## 2023-04-03 NOTE — HPI
Chief complaint increased BUN and creatinine    History of present illness Mr. Collins is an 82-year-old  gentleman with CKD stage 5.  The patient is followed by Dr. Aceves in the outpatient setting.  The patient had a AV fistula placed in his right arm a few weeks ago.  The patient started having some shortness of breath symptoms a couple nights ago and came to the emergency room where he was admitted for further evaluation and treatment.  We were asked to see the patient for his kidney failure.  The patient's creatinine is increased around 7.5, a few weeks ago his creatinine was 6.9.  The patient was to see Dr. Sierra in about 10 days to recheck his fistula.  The patient has kidney failure thought to be secondary to hypertension.  The patient states he does not have much of an appetite, he states food has no taste.  He also states his energy level has been down..  He also relates that he has been sleepy a lot although this is unchanged from when he did not have significant kidney failure.  The patient's shortness of breath symptoms have improved he was taken off oxygen supplementation last night.  He denies any fever or chills.  He states he had a little bit of a cough with it but was not producing anything.

## 2023-04-03 NOTE — SUBJECTIVE & OBJECTIVE
Past Medical History:   Diagnosis Date    Elevated PSA     Gout, unspecified     Hypertension     Rheumatoid arthritis, unspecified        Past Surgical History:   Procedure Laterality Date    AV FISTULA PLACEMENT Right 3/20/2023    Procedure: CREATION, AV FISTULA;  Surgeon: Alfred Sierra MD;  Location: [facility];  Service: General;  Laterality: Right;  right basilic vein transposition    HAND SURGERY Left     urolift      in [place];       Review of patient's allergies indicates:  No Known Allergies  Current Facility-Administered Medications   Medication Frequency    acetaminophen tablet 1,000 mg Q8H PRN    acetaminophen tablet 650 mg Q8H PRN    acetaminophen tablet 650 mg Q4H PRN    aluminum-magnesium hydroxide-simethicone 200-200-20 mg/5 mL suspension 30 mL QID PRN    cloNIDine tablet 0.2 mg Q8H PRN    dextrose 10% bolus 125 mL 125 mL PRN    dextrose 10% bolus 250 mL 250 mL PRN    furosemide injection 40 mg BID    heparin (porcine) injection 5,000 Units Q12H    hydrALAZINE tablet 25 mg Q8H PRN    HYDROcodone-acetaminophen 7.5-325 mg per tablet 1 tablet Q6H PRN    magnesium oxide tablet 800 mg PRN    magnesium oxide tablet 800 mg PRN    melatonin tablet 6 mg Nightly PRN    naloxone 0.4 mg/mL injection 0.02 mg PRN    NIFEdipine 24 hr tablet 30 mg Daily    ondansetron injection 4 mg Q8H PRN    polyethylene glycol packet 17 g Daily PRN    potassium bicarbonate disintegrating tablet 35 mEq PRN    potassium bicarbonate disintegrating tablet 50 mEq PRN    potassium bicarbonate disintegrating tablet 60 mEq PRN    potassium, sodium phosphates 280-160-250 mg packet 2 packet PRN    potassium, sodium phosphates 280-160-250 mg packet 2 packet PRN    potassium, sodium phosphates 280-160-250 mg packet 2 packet PRN    sodium chloride 0.9% flush 10 mL PRN     Family History       Problem Relation (Age of Onset)    Breast cancer Sister    Heart disease Father          Tobacco Use    Smoking status: Former     Types:  Cigarettes     Quit date:      Years since quittin.2    Smokeless tobacco: Never   Substance and Sexual Activity    Alcohol use: Not Currently     Comment: quit in     Drug use: Never    Sexual activity: Not on file     Review of Systems   Constitutional:  Negative for fever.   HENT:  Negative for sore throat.    Respiratory:  Positive for shortness of breath.    Cardiovascular:  Negative for chest pain.   Gastrointestinal:  Negative for vomiting.   Genitourinary:  Negative for dysuria.   Musculoskeletal:  Positive for arthralgias.   Skin:  Negative for rash.   Neurological:  Negative for seizures.   Hematological:  Negative for adenopathy.   Objective:     Vital Signs (Most Recent):  Temp: 99.8 °F (37.7 °C) (23 1100)  Pulse: 90 (23 1100)  Resp: 19 (23 1100)  BP: (!) 162/85 (23 1100)  SpO2: 98 % (23 1100)   Vital Signs (24h Range):  Temp:  [97.5 °F (36.4 °C)-99.8 °F (37.7 °C)] 99.8 °F (37.7 °C)  Pulse:  [] 90  Resp:  [18-20] 19  SpO2:  [96 %-100 %] 98 %  BP: (129-218)/() 162/85     Weight: 63 kg (139 lb) (23 1810)  Body mass index is 21.13 kg/m².  Body surface area is 1.74 meters squared.    I/O last 3 completed shifts:  In: 850 [P.O.:850]  Out: 700 [Urine:700]    Physical Exam  Vitals reviewed.   Constitutional:       General: He is not in acute distress.     Appearance: He is not toxic-appearing.   HENT:      Head: Normocephalic and atraumatic.      Nose: Nose normal.      Mouth/Throat:      Mouth: Mucous membranes are moist.      Pharynx: Oropharynx is clear.   Eyes:      General: No scleral icterus.     Conjunctiva/sclera: Conjunctivae normal.      Pupils: Pupils are equal, round, and reactive to light.   Cardiovascular:      Rate and Rhythm: Normal rate and regular rhythm.   Pulmonary:      Effort: No respiratory distress.      Breath sounds: Normal breath sounds.   Abdominal:      General: Bowel sounds are normal.      Palpations: Abdomen is soft.  There is no mass.   Musculoskeletal:         General: No swelling or tenderness.      Cervical back: Normal range of motion and neck supple.   Lymphadenopathy:      Cervical: No cervical adenopathy.   Skin:     General: Skin is warm and dry.      Findings: No erythema.   Neurological:      General: No focal deficit present.      Mental Status: Mental status is at baseline.   Psychiatric:         Mood and Affect: Mood normal.         Behavior: Behavior normal.       Significant Labs:  All labs within the past 24 hours have been reviewed.    Significant Imaging:

## 2023-04-03 NOTE — PLAN OF CARE
[facility]  Initial Discharge Assessment       Primary Care Provider: Primary Doctor No    Admission Diagnosis: Shortness of breath [R06.02]  Dyspnea [R06.00]  ESRD (end stage renal disease) [N18.6]  NSTEMI (non-ST elevated myocardial infarction) [I21.4]  Heart failure [I50.9]  Acute congestive heart failure, unspecified heart failure type [I50.9]  Congestive heart failure, unspecified HF chronicity, unspecified heart failure type [I50.9]    Admission Date: 4/2/2023  Expected Discharge Date:     Discharge Barriers Identified: None    Payor: [facility] MCARE / Plan: [facility] MEDICARE COMPLETE / Product Type: Medicare Advantage /     Extended Emergency Contact Information  Primary Emergency Contact: leila rubi  Mobile Phone: [phone]  Relation: Sister  Preferred language: English   needed? No  Secondary Emergency Contact: Jose Cedeño  Mobile Phone: [phone]  Relation: Relative  Preferred language: English   needed? No    Discharge Plan A: Home  Discharge Plan B: Home Health      [facility] ([facility] ) - [place], [place] - [address]  [address]  [address]  [facility] [address]  Phone: [phone] Fax: [phone]    [facility] [address] [place] [address] - [address]  [address] [facility] [address]  Phone: [phone] Fax: [phone]    [facility] [place] - [address] [place] [address]  [address] [place] [address]  Phone: [phone] Fax: [phone]      Initial Assessment (most recent)       Adult Discharge Assessment - 04/03/23 1031          Discharge Assessment    Assessment Type Discharge Planning Assessment     Source of Information patient     Communicated RASHAD with patient/caregiver Date not available/Unable to determine     People in Home alone     Do you expect to return to your current living situation? Yes     Do you have help at home or someone to help you manage your care  at home? No     Current cognitive status: Alert/Oriented     Equipment Currently Used at Home none     Patient currently being followed by outpatient case management? No     Do you currently have service(s) that help you manage your care at home? No     Do you take prescription medications? Yes     Do you have any problems affording any of your prescribed medications? No     Is the patient taking medications as prescribed? yes     Who is going to help you get home at discharge? family     How do you get to doctors appointments? family or friend will provide     Are you on dialysis? No     Do you take coumadin? No     Discharge Plan A Home     Discharge Plan B Home Health     DME Needed Upon Discharge  other (see comments)   to be determined    Discharge Plan discussed with: Patient     Discharge Barriers Identified None        Physical Activity    On average, how many days per week do you engage in moderate to strenuous exercise (like a brisk walk)? 0 days     On average, how many minutes do you engage in exercise at this level? 0 min        Financial Resource Strain    How hard is it for you to pay for the very basics like food, housing, medical care, and heating? Somewhat hard        Housing Stability    In the last 12 months, was there a time when you were not able to pay the mortgage or rent on time? No     In the last 12 months, how many places have you lived? 1     In the last 12 months, was there a time when you did not have a steady place to sleep or slept in a shelter (including now)? No        Transportation Needs    In the past 12 months, has lack of transportation kept you from medical appointments or from getting medications? No     In the past 12 months, has lack of transportation kept you from meetings, work, or from getting things needed for daily living? No        Food Insecurity    Within the past 12 months, you worried that your food would run out before you got the money to buy more. Sometimes  true     Within the past 12 months, the food you bought just didn't last and you didn't have money to get more. Sometimes true        Stress    Do you feel stress - tense, restless, nervous, or anxious, or unable to sleep at night because your mind is troubled all the time - these days? Only a little        Social Connections    In a typical week, how many times do you talk on the phone with family, friends, or neighbors? More than three times a week     How often do you get together with friends or relatives? Once a week     How often do you attend Temple or [employer]ian[employer] services? More than 4 times per year     Do you belong to any clubs or organizations such as Temple groups, unions, fraternal or athletic groups, or school groups? No     How often do you attend meetings of the clubs or organizations you belong to? Never     Are you , , , , never , or living with a partner?         Alcohol Use    Q1: How often do you have a drink containing alcohol? Never     Q2: How many drinks containing alcohol do you have on a typical day when you are drinking? Patient does not drink     Q3: How often do you have six or more drinks on one occasion? Never                      Spoke with patient in his room. He lives alone. He states that he pays people to help him as needed. He uses no equipment at home but states that he needs a walker. Not current with home health. IM explained and signed. Dc plan is home alone.  Will ask Dr for pt/ot eval to assess needs for equipment. Will follow.

## 2023-04-03 NOTE — NURSING
Patient's daughter, Elida, called patient's room and asked to speak with me. I told her to call the floor and I would speak with her at the desk. Patient gave full permission for this. I updated patient's daughter on condition and all labs, x rays, and consults that have been put in. I advised patient's daughter to call tomorrow during the day to speak with day shift for an update and she can call again tomorrow night as well for an update. She [place] of [place] and is unable to travel to be with her father at this time

## 2023-04-03 NOTE — ASSESSMENT & PLAN NOTE
This patient has chronic kidney disease stage 5, he is not started dialysis.  He does have some symptoms that may be related to his uremia with decreased appetite and food tasting abnormal.  The patient has a developing AV fistula.  Dialysis initiation would probably until a dialysis catheter placement.  If we could postpone dialysis initiation a few more weeks to a month or more it may serve him well avoiding the dialysis catheter.

## 2023-04-03 NOTE — CONSULTS
[facility]  Nephrology  Consult Note    Patient Name: Pardeep Collins  MRN: [id number]  Admission Date: 4/2/2023  Hospital Length of Stay: 1 days  Attending Provider: Nely Quinonez MD   Primary Care Physician: Primary Doctor No  Principal Problem:ESRD (end stage renal disease)    Consults  Subjective:     HPI: Chief complaint increased BUN and creatinine    History of present illness Mr. Collins is an 82-year-old  gentleman with CKD stage 5.  The patient is followed by Dr. Aceves in the outpatient setting.  The patient had a AV fistula placed in his right arm a few weeks ago.  The patient started having some shortness of breath symptoms a couple nights ago and came to the emergency room where he was admitted for further evaluation and treatment.  We were asked to see the patient for his kidney failure.  The patient's creatinine is increased around 7.5, a few weeks ago his creatinine was 6.9.  The patient was to see Dr. Sierra in about 10 days to recheck his fistula.  The patient has kidney failure thought to be secondary to hypertension.  The patient states he does not have much of an appetite, he states food has no taste.  He also states his energy level has been down..  He also relates that he has been sleepy a lot although this is unchanged from when he did not have significant kidney failure.  The patient's shortness of breath symptoms have improved he was taken off oxygen supplementation last night.  He denies any fever or chills.  He states he had a little bit of a cough with it but was not producing anything.      Past Medical History:   Diagnosis Date    Elevated PSA     Gout, unspecified     Hypertension     Rheumatoid arthritis, unspecified        Past Surgical History:   Procedure Laterality Date    AV FISTULA PLACEMENT Right 3/20/2023    Procedure: CREATION, AV FISTULA;  Surgeon: Alfred Sierra MD;  Location: [facility];  Service: General;  Laterality: Right;   right basilic vein transposition    HAND SURGERY Left     urolift      in [place];       Review of patient's allergies indicates:  No Known Allergies  Current Facility-Administered Medications   Medication Frequency    acetaminophen tablet 1,000 mg Q8H PRN    acetaminophen tablet 650 mg Q8H PRN    acetaminophen tablet 650 mg Q4H PRN    aluminum-magnesium hydroxide-simethicone 200-200-20 mg/5 mL suspension 30 mL QID PRN    cloNIDine tablet 0.2 mg Q8H PRN    dextrose 10% bolus 125 mL 125 mL PRN    dextrose 10% bolus 250 mL 250 mL PRN    furosemide injection 40 mg BID    heparin (porcine) injection 5,000 Units Q12H    hydrALAZINE tablet 25 mg Q8H PRN    HYDROcodone-acetaminophen 7.5-325 mg per tablet 1 tablet Q6H PRN    magnesium oxide tablet 800 mg PRN    magnesium oxide tablet 800 mg PRN    melatonin tablet 6 mg Nightly PRN    naloxone 0.4 mg/mL injection 0.02 mg PRN    NIFEdipine 24 hr tablet 30 mg Daily    ondansetron injection 4 mg Q8H PRN    polyethylene glycol packet 17 g Daily PRN    potassium bicarbonate disintegrating tablet 35 mEq PRN    potassium bicarbonate disintegrating tablet 50 mEq PRN    potassium bicarbonate disintegrating tablet 60 mEq PRN    potassium, sodium phosphates 280-160-250 mg packet 2 packet PRN    potassium, sodium phosphates 280-160-250 mg packet 2 packet PRN    potassium, sodium phosphates 280-160-250 mg packet 2 packet PRN    sodium chloride 0.9% flush 10 mL PRN     Family History       Problem Relation (Age of Onset)    Breast cancer Sister    Heart disease Father          Tobacco Use    Smoking status: Former     Types: Cigarettes     Quit date:      Years since quittin.2    Smokeless tobacco: Never   Substance and Sexual Activity    Alcohol use: Not Currently     Comment: quit in     Drug use: Never    Sexual activity: Not on file     Review of Systems   Constitutional:  Negative for fever.   HENT:  Negative for sore throat.     Respiratory:  Positive for shortness of breath.    Cardiovascular:  Negative for chest pain.   Gastrointestinal:  Negative for vomiting.   Genitourinary:  Negative for dysuria.   Musculoskeletal:  Positive for arthralgias.   Skin:  Negative for rash.   Neurological:  Negative for seizures.   Hematological:  Negative for adenopathy.   Objective:     Vital Signs (Most Recent):  Temp: 99.8 °F (37.7 °C) (04/03/23 1100)  Pulse: 90 (04/03/23 1100)  Resp: 19 (04/03/23 1100)  BP: (!) 162/85 (04/03/23 1100)  SpO2: 98 % (04/03/23 1100)   Vital Signs (24h Range):  Temp:  [97.5 °F (36.4 °C)-99.8 °F (37.7 °C)] 99.8 °F (37.7 °C)  Pulse:  [] 90  Resp:  [18-20] 19  SpO2:  [96 %-100 %] 98 %  BP: (129-218)/() 162/85     Weight: 63 kg (139 lb) (04/02/23 1810)  Body mass index is 21.13 kg/m².  Body surface area is 1.74 meters squared.    I/O last 3 completed shifts:  In: 850 [P.O.:850]  Out: 700 [Urine:700]    Physical Exam  Vitals reviewed.   Constitutional:       General: He is not in acute distress.     Appearance: He is not toxic-appearing.   HENT:      Head: Normocephalic and atraumatic.      Nose: Nose normal.      Mouth/Throat:      Mouth: Mucous membranes are moist.      Pharynx: Oropharynx is clear.   Eyes:      General: No scleral icterus.     Conjunctiva/sclera: Conjunctivae normal.      Pupils: Pupils are equal, round, and reactive to light.   Cardiovascular:      Rate and Rhythm: Normal rate and regular rhythm.   Pulmonary:      Effort: No respiratory distress.      Breath sounds: Normal breath sounds.   Abdominal:      General: Bowel sounds are normal.      Palpations: Abdomen is soft. There is no mass.   Musculoskeletal:         General: No swelling or tenderness.      Cervical back: Normal range of motion and neck supple.   Lymphadenopathy:      Cervical: No cervical adenopathy.   Skin:     General: Skin is warm and dry.      Findings: No erythema.   Neurological:      General: No focal deficit present.       Mental Status: Mental status is at baseline.   Psychiatric:         Mood and Affect: Mood normal.         Behavior: Behavior normal.       Significant Labs:  All labs within the past 24 hours have been reviewed.    Significant Imaging:      Assessment/Plan:     Pulmonary  Shortness of breath  This has improved, the patient was taken off supplemental oxygen last night    Cardiac/Vascular  Primary hypertension  Continue present antihypertensives    Renal/  * ESRD (end stage renal disease)  This patient has chronic kidney disease stage 5, he is not started dialysis.  He does have some symptoms that may be related to his uremia with decreased appetite and food tasting abnormal.  The patient has a developing AV fistula.  Dialysis initiation would probably until a dialysis catheter placement.  If we could postpone dialysis initiation a few more weeks to a month or more it may serve him well avoiding the dialysis catheter.    Oncology  Anemia  Patient's hematocrit is around 26%, continue to monitor.        Thank you for your consult. I will follow-up with patient. Please contact us if you have any additional questions.    Dk Walton MD  Nephrology  [facility]
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
## 2023-04-04 VITALS
RESPIRATION RATE: 18 BRPM | HEIGHT: 68 IN | TEMPERATURE: 98 F | HEART RATE: 88 BPM | BODY MASS INDEX: 21.07 KG/M2 | DIASTOLIC BLOOD PRESSURE: 77 MMHG | SYSTOLIC BLOOD PRESSURE: 154 MMHG | WEIGHT: 139 LBS | OXYGEN SATURATION: 96 %

## 2023-04-04 LAB
ANION GAP SERPL CALCULATED.3IONS-SCNC: 14 MMOL/L (ref 7–16)
AORTIC ROOT ANNULUS: 2.6 CM
AORTIC VALVE CUSP SEPERATION: 1.78 CM
AV INDEX (PROSTH): 0.62
AV MEAN GRADIENT: 5 MMHG
AV PEAK GRADIENT: 9 MMHG
AV REGURGITATION PRESSURE HALF TIME: 644 MS
AV VALVE AREA: 1.57 CM2
AV VELOCITY RATIO: 0.67
BASOPHILS # BLD AUTO: 0.01 K/UL (ref 0–0.2)
BASOPHILS NFR BLD AUTO: 0.2 % (ref 0–1)
BSA FOR ECHO PROCEDURE: 1.74 M2
BUN SERPL-MCNC: 64 MG/DL (ref 7–18)
BUN/CREAT SERPL: 8 (ref 6–20)
CALCIUM SERPL-MCNC: 8.7 MG/DL (ref 8.5–10.1)
CHLORIDE SERPL-SCNC: 107 MMOL/L (ref 98–107)
CO2 SERPL-SCNC: 23 MMOL/L (ref 21–32)
CREAT SERPL-MCNC: 7.58 MG/DL (ref 0.7–1.3)
CV ECHO LV RWT: 0.47 CM
DIFFERENTIAL METHOD BLD: ABNORMAL
DOP CALC AO PEAK VEL: 1.5 M/S
DOP CALC AO VTI: 26 CM
DOP CALC LVOT AREA: 2.5 CM2
DOP CALC LVOT DIAMETER: 1.8 CM
DOP CALC LVOT PEAK VEL: 1 M/S
DOP CALC LVOT STROKE VOLUME: 40.69 CM3
DOP CALC MV VTI: 94.9 CM
DOP CALCLVOT PEAK VEL VTI: 16 CM
E WAVE DECELERATION TIME: 146 MSEC
ECHO EF ESTIMATED: 50 %
ECHO LV POSTERIOR WALL: 1.11 CM (ref 0.6–1.1)
EGFR (NO RACE VARIABLE) (RUSH/TITUS): 7 ML/MIN/1.73M²
EJECTION FRACTION: 50 %
EOSINOPHIL # BLD AUTO: 0.24 K/UL (ref 0–0.5)
EOSINOPHIL NFR BLD AUTO: 5.5 % (ref 1–4)
ERYTHROCYTE [DISTWIDTH] IN BLOOD BY AUTOMATED COUNT: 12.1 % (ref 11.5–14.5)
FRACTIONAL SHORTENING: 16 % (ref 28–44)
GLUCOSE SERPL-MCNC: 88 MG/DL (ref 74–106)
HCT VFR BLD AUTO: 23.1 % (ref 40–54)
HGB BLD-MCNC: 7.5 G/DL (ref 13.5–18)
IMM GRANULOCYTES # BLD AUTO: 0.01 K/UL (ref 0–0.04)
IMM GRANULOCYTES NFR BLD: 0.2 % (ref 0–0.4)
INTERVENTRICULAR SEPTUM: 1 CM (ref 0.6–1.1)
IVC OSTIUM: 1.4 CM
LEFT ATRIUM SIZE: 3.5 CM
LEFT INTERNAL DIMENSION IN SYSTOLE: 3.92 CM (ref 2.1–4)
LEFT VENTRICLE DIASTOLIC VOLUME INDEX: 58.23 ML/M2
LEFT VENTRICLE DIASTOLIC VOLUME: 101.9 ML
LEFT VENTRICLE MASS INDEX: 101 G/M2
LEFT VENTRICLE SYSTOLIC VOLUME INDEX: 38.1 ML/M2
LEFT VENTRICLE SYSTOLIC VOLUME: 66.7 ML
LEFT VENTRICULAR INTERNAL DIMENSION IN DIASTOLE: 4.69 CM (ref 3.5–6)
LEFT VENTRICULAR MASS: 176.38 G
LVOT MG: 2 MMHG
LYMPHOCYTES # BLD AUTO: 1.43 K/UL (ref 1–4.8)
LYMPHOCYTES NFR BLD AUTO: 32.9 % (ref 27–41)
MAGNESIUM SERPL-MCNC: 3.2 MG/DL (ref 1.7–2.3)
MCH RBC QN AUTO: 31.5 PG (ref 27–31)
MCHC RBC AUTO-ENTMCNC: 32.5 G/DL (ref 32–36)
MCV RBC AUTO: 97.1 FL (ref 80–96)
MONOCYTES # BLD AUTO: 0.39 K/UL (ref 0–0.8)
MONOCYTES NFR BLD AUTO: 9 % (ref 2–6)
MPC BLD CALC-MCNC: 13.4 FL (ref 9.4–12.4)
MV PEAK E VEL: 2.9 M/S
MV PEAK GRADIENT: 79 MMHG
MV STENOSIS PRESSURE HALF TIME: 54 MS
MV VALVE AREA BY CONTINUITY EQUATION: 0.43 CM2
MV VALVE AREA P 1/2 METHOD: 4.07 CM2
NEUTROPHILS # BLD AUTO: 2.26 K/UL (ref 1.8–7.7)
NEUTROPHILS NFR BLD AUTO: 52.2 % (ref 53–65)
NRBC # BLD AUTO: 0 X10E3/UL
NRBC, AUTO (.00): 0 %
PISA AR MAX VEL: 4.71 M/S
PISA TR MAX VEL: 4 M/S
PLATELET # BLD AUTO: 149 K/UL (ref 150–400)
POTASSIUM SERPL-SCNC: 4.4 MMOL/L (ref 3.5–5.1)
RA MAJOR: 3.7 CM
RA PRESSURE: 3 MMHG
RBC # BLD AUTO: 2.38 M/UL (ref 4.6–6.2)
RIGHT VENTRICULAR END-DIASTOLIC DIMENSION: 3.5 CM
SODIUM SERPL-SCNC: 140 MMOL/L (ref 136–145)
TR MAX PG: 64 MMHG
TRICUSPID ANNULAR PLANE SYSTOLIC EXCURSION: 1.8 CM
TV REST PULMONARY ARTERY PRESSURE: 67 MMHG
WBC # BLD AUTO: 4.34 K/UL (ref 4.5–11)

## 2023-04-04 PROCEDURE — 99239 PR HOSPITAL DISCHARGE DAY,>30 MIN: ICD-10-PCS | Mod: ,,, | Performed by: HOSPITALIST

## 2023-04-04 PROCEDURE — 99239 HOSP IP/OBS DSCHRG MGMT >30: CPT | Mod: ,,, | Performed by: HOSPITALIST

## 2023-04-04 PROCEDURE — 97161 PT EVAL LOW COMPLEX 20 MIN: CPT

## 2023-04-04 PROCEDURE — 85025 COMPLETE CBC W/AUTO DIFF WBC: CPT | Performed by: HOSPITALIST

## 2023-04-04 PROCEDURE — 25000003 PHARM REV CODE 250: Performed by: INTERNAL MEDICINE

## 2023-04-04 PROCEDURE — 80048 BASIC METABOLIC PNL TOTAL CA: CPT | Performed by: HOSPITALIST

## 2023-04-04 PROCEDURE — 83735 ASSAY OF MAGNESIUM: CPT | Performed by: HOSPITALIST

## 2023-04-04 PROCEDURE — 63600175 PHARM REV CODE 636 W HCPCS: Performed by: HOSPITALIST

## 2023-04-04 PROCEDURE — 25000003 PHARM REV CODE 250: Performed by: HOSPITALIST

## 2023-04-04 PROCEDURE — 97165 OT EVAL LOW COMPLEX 30 MIN: CPT

## 2023-04-04 RX ORDER — FUROSEMIDE 20 MG/1
20 TABLET ORAL 2 TIMES DAILY
Qty: 60 TABLET | Refills: 1 | Status: SHIPPED | OUTPATIENT
Start: 2023-04-04 | End: 2024-04-03

## 2023-04-04 RX ADMIN — SODIUM BICARBONATE 650 MG TABLET 1300 MG: at 09:04

## 2023-04-04 RX ADMIN — NIFEDIPINE 30 MG: 30 TABLET, FILM COATED, EXTENDED RELEASE ORAL at 09:04

## 2023-04-04 RX ADMIN — HEPARIN SODIUM 5000 UNITS: 5000 INJECTION, SOLUTION INTRAVENOUS; SUBCUTANEOUS at 09:04

## 2023-04-04 RX ADMIN — FUROSEMIDE 40 MG: 10 INJECTION, SOLUTION INTRAMUSCULAR; INTRAVENOUS at 09:04

## 2023-04-04 NOTE — PLAN OF CARE
Consult for home health. Spoke with patient. He gave choice for [facility]. Faxed and spoke with Alireza Mckeon plan for home today.

## 2023-04-04 NOTE — ASSESSMENT & PLAN NOTE
Shortness of breath likely related ESRD.   RUE AV graft placed on 3/20/23.   Nephrology consulted.    Followed by Dr. Aceves as outpatient.    Dr. Boykin consulted.      4/3:  Patient is improving with IV diuresis and does not appear to require hemodialysis at this time.    F/u with Dr. Aceves as outpatient.

## 2023-04-04 NOTE — PLAN OF CARE
Problem: Physical Therapy  Goal: Physical Therapy Goal  Description: Short Term Goals to be met by: 23    Patient will increase functional independence with mobility by performin. Supine to sit with independently  2. Sit to stand transfer with independently using no assistive device  3. Bed to chair transfer with independently using no assistive device  4. Gait  x 100 feet with supervision using Rolling walker  5. Lower extremity exercise program x30 reps per handout, with assistance as needed  6. Pt to negotiate 4 steps with bilat rails with supervision    Long Term Goals to be met by: 23    Pt will regain full independent functional mobility with lowest level of assistive device to return to home situation and prior activities of daily living.   Outcome: Ongoing, Progressing     Evaluation completed. See eval document for details.

## 2023-04-04 NOTE — NURSING
Spoke with Dr Aceves regarding follow up appt. Dr Aceves stated he would see this patient at dialysis next week.

## 2023-04-04 NOTE — DISCHARGE INSTRUCTIONS
Hospitalist Discharge orders  *Notify your healthcare provider if you experience any of the following: temperature >100.4  * Notify your healthcare provider if you experience any of the following: redness, tenderness.    *Notify your healthcare provider if you experience any of the following: difficulty breathing or     increased cough.  *Notify your physician if you experience any persistent nausea, vomiting, or diarrhea or headache  *Notify your physician if you experience any of the following:severe uncontrolled pain;worsening rash, increased confusion or weakness; dizziness, lightheadedness or visual disturbances         Physical Therapy Instructions    Complete the exercises standing at the kitchen sink for stability. Work up to 30 repetitions each 2 times a day. Take rest breaks as needed and keep a chair close by to sit down if needed.      Toe/Heel Raises            Stand while holding a stable object. Rise up on toes. Then rock back on heels. Hold each position 2 seconds.  Repeat 30 times per session. Do 2 sessions per day.            Squat: Double Leg (Supported)        With feet shoulder width apart, hold support. Squat, keeping lower leg vertical, knee in line with second toe. Use legs, do not pull up and down with arms. Only squat down as low as you are comfortable. Repeat 30 times with rest breaks as needed. Do 2 sessions per day         Hip Flexion (Standing)        Stand with support. Lift right knee upward. Repeat 30 times. Repeat with other leg.   Do 2 sessions per day.      HIP: Abduction - Standing        Lift one leg out to the side holding on to a support. Keep toe pointing forward to focus the exercise on the muscles on the outside of your hip.  Repeat 30 times per side. 2 times per day        SINGLE LIMB STANCE        Stand facing your kitchen counter for safety.  Balance on one leg. Hold as long as possible.    Repeat with 5 times per leg.

## 2023-04-04 NOTE — PLAN OF CARE
[facility]  Discharge Final Note    Primary Care Provider: Primary Doctor No    Expected Discharge Date: 4/4/2023    Final Discharge Note (most recent)       Final Note - 04/04/23 1322          Final Note    Assessment Type Final Discharge Note     Anticipated Discharge Disposition Home-Health Care Svc        Post-Acute Status    Post-Acute Authorization HME;Home Health     HME Status Set-up Complete/Auth obtained     Home Health Status Set-up Complete/Auth obtained     Patient choice form signed by patient/caregiver List with quality metrics by geographic area provided;List from CMS Compare;List from System Post-Acute Care                     Important Message from Medicare  Important Message from Medicare regarding Discharge Appeal Rights: Explained to patient/caregiver, Signed/date by patient/caregiver     Date IMM was signed: 04/03/23  Time IMM was signed: 1010     Follow-up providers       No, Primary Doctor   Relationship: PCP - General        Next Steps: Follow up in 1 week(s)    Chandler Aceves Jr, MD   Specialty: Internal Medicine    [address]  [facility] [address]   Phone: [phone]       Next Steps: Follow up in 1 week(s)    Instructions: EVELIN on CKD.              After-discharge care                Durable Medical Equipment       [facility]   Service: Durable Medical Equipment    [address] [place] [id number]   Phone: [phone]                 Home Medical Care       [facility]   Service: Home Health Services    [address] [place], [place] [address]   Phone: [phone]                             Patient to discharge home today. Home health set up. Rollator delivered to the patients room. IM done.

## 2023-04-04 NOTE — CONSULTS
[facility]  General Surgery  Consult Note    Patient Name: Pardeep Collins  MRN: [id number]  Code Status: Full Code  Admission Date: 4/2/2023  Hospital Length of Stay: 2 days  Attending Physician: Nely Quinonez MD  Primary Care Provider: Primary Doctor No    Patient information was obtained from patient and past medical records.     Inpatient consult to General Surgery  Consult performed by: MAX Sainz  Consult ordered by: Dk Walton MD        Subjective:     Principal Problem: ESRD (end stage renal disease)    History of Present Illness: Patient admitted for dyspnea with worsening renal function.  General surgery consulted to evaluate AV graft.  Graft placed by Dr. Sierra on 03/20/2023.  Ultrasound performed of the right upper extremity yesterday.  Graft not mature enough to be used for hemodialysis yet.  If the patient needs hemodialysis in the interim, will need HD cath.      No current facility-administered medications on file prior to encounter.     Current Outpatient Medications on File Prior to Encounter   Medication Sig    desmopressin (DDAVP) 0.2 MG tablet Take 1 tablet (200 mcg total) by mouth nightly.    HYDROcodone-acetaminophen (NORCO) 7.5-325 mg per tablet Take 1 tablet by mouth every 6 (six) hours as needed for Pain.    NIFEdipine (ADALAT CC) 30 MG TbSR Take 30 mg by mouth once daily.       Review of patient's allergies indicates:  No Known Allergies    Past Medical History:   Diagnosis Date    Elevated PSA     Gout, unspecified     Hypertension     Rheumatoid arthritis, unspecified      Past Surgical History:   Procedure Laterality Date    AV FISTULA PLACEMENT Right 3/20/2023    Procedure: CREATION, AV FISTULA;  Surgeon: Alfred Sierra MD;  Location: [facility];  Service: General;  Laterality: Right;  right basilic vein transposition    HAND SURGERY Left     urolift      in [place];     Family History       Problem Relation (Age of Onset)    Breast  cancer Sister    Heart disease Father          Tobacco Use    Smoking status: Former     Types: Cigarettes     Quit date:      Years since quittin.2    Smokeless tobacco: Never   Substance and Sexual Activity    Alcohol use: Not Currently     Comment: quit in     Drug use: Never    Sexual activity: Not on file     Review of Systems   Constitutional:  Negative for fever.   Respiratory:  Positive for shortness of breath (Resolved).    Cardiovascular: Negative.    Neurological: Negative.    Objective:     Vital Signs (Most Recent):  Temp: 97.7 °F (36.5 °C) (23 1100)  Pulse: 88 (23 1100)  Resp: 18 (23 1100)  BP: (!) 154/77 (23 1100)  SpO2: 96 % (23 1100)   Vital Signs (24h Range):  Temp:  [97.7 °F (36.5 °C)-98.8 °F (37.1 °C)] 97.7 °F (36.5 °C)  Pulse:  [85-94] 88  Resp:  [18-20] 18  SpO2:  [94 %-98 %] 96 %  BP: (142-179)/(77-90) 154/77     Weight: 63 kg (139 lb)  Body mass index is 21.13 kg/m².    Physical Exam  Vitals reviewed.   Cardiovascular:      Rate and Rhythm: Normal rate.      Comments: AV graft thrill, 3+ radial pulse.  Pulmonary:      Effort: Pulmonary effort is normal. No respiratory distress.   Skin:     General: Skin is warm and dry.   Neurological:      Mental Status: He is alert. Mental status is at baseline.       Significant Labs:  I have reviewed all pertinent lab results within the past 24 hours.  CBC:   Recent Labs   Lab 23  0240   WBC 4.34*   RBC 2.38*   HGB 7.5*   HCT 23.1*   *   MCV 97.1*   MCH 31.5*   MCHC 32.5     CMP:   Recent Labs   Lab 23  0540 23  0446 23  0240   *   < > 88   CALCIUM 8.8   < > 8.7   ALBUMIN 3.0*  --   --    PROT 6.9  --   --       < > 140   K 5.0   < > 4.4   CO2 19*   < > 23      < > 107   BUN 60*   < > 64*   CREATININE 7.43*   < > 7.58*   ALKPHOS 74  --   --    ALT 14*  --   --    AST 14*  --   --    BILITOT 0.3  --   --     < > = values in this interval not displayed.        Significant Diagnostics:  I have reviewed all pertinent imaging results/findings within the past 24 hours.      Assessment/Plan:     No notes have been filed under this hospital service.  Service: General Surgery    VTE Risk Mitigation (From admission, onward)         Ordered     heparin (porcine) injection 5,000 Units  Every 12 hours         04/02/23 0937     Place sequential compression device  Until discontinued         04/02/23 0937     IP VTE HIGH RISK PATIENT  Once         04/02/23 0937                Thank you for your consult. I will sign off. Please contact us if you have any additional questions.    Jt Martinez, RONALDP  General Surgery  [facility]

## 2023-04-04 NOTE — ASSESSMENT & PLAN NOTE
Shortness of breath likely related ESRD.   RUE AV graft placed on 3/20/23.   Nephrology consulted.    Followed by Dr. Aceves as outpatient.    Dr. Boykin consulted.      4/3:  Patient is improving with IV diuresis and does not appear to require hemodialysis at this time.

## 2023-04-04 NOTE — SUBJECTIVE & OBJECTIVE
No current facility-administered medications on file prior to encounter.     Current Outpatient Medications on File Prior to Encounter   Medication Sig    desmopressin (DDAVP) 0.2 MG tablet Take 1 tablet (200 mcg total) by mouth nightly.    HYDROcodone-acetaminophen (NORCO) 7.5-325 mg per tablet Take 1 tablet by mouth every 6 (six) hours as needed for Pain.    NIFEdipine (ADALAT CC) 30 MG TbSR Take 30 mg by mouth once daily.       Review of patient's allergies indicates:  No Known Allergies    Past Medical History:   Diagnosis Date    Elevated PSA     Gout, unspecified     Hypertension     Rheumatoid arthritis, unspecified      Past Surgical History:   Procedure Laterality Date    AV FISTULA PLACEMENT Right 3/20/2023    Procedure: CREATION, AV FISTULA;  Surgeon: Alfred Sierra MD;  Location: [facility];  Service: General;  Laterality: Right;  right basilic vein transposition    HAND SURGERY Left     urolift      in [place];     Family History       Problem Relation (Age of Onset)    Breast cancer Sister    Heart disease Father          Tobacco Use    Smoking status: Former     Types: Cigarettes     Quit date:      Years since quittin.2    Smokeless tobacco: Never   Substance and Sexual Activity    Alcohol use: Not Currently     Comment: quit in     Drug use: Never    Sexual activity: Not on file     Review of Systems   Constitutional:  Negative for fever.   Respiratory:  Positive for shortness of breath (Resolved).    Cardiovascular: Negative.    Neurological: Negative.    Objective:     Vital Signs (Most Recent):  Temp: 97.7 °F (36.5 °C) (23 1100)  Pulse: 88 (23 1100)  Resp: 18 (23 1100)  BP: (!) 154/77 (23 1100)  SpO2: 96 % (23 1100)   Vital Signs (24h Range):  Temp:  [97.7 °F (36.5 °C)-98.8 °F (37.1 °C)] 97.7 °F (36.5 °C)  Pulse:  [85-94] 88  Resp:  [18-20] 18  SpO2:  [94 %-98 %] 96 %  BP: (142-179)/(77-90) 154/77     Weight: 63 kg (139 lb)  Body mass index is 21.13  kg/m².    Physical Exam  Vitals reviewed.   Cardiovascular:      Rate and Rhythm: Normal rate.      Comments: AV graft thrill, 3+ radial pulse.  Pulmonary:      Effort: Pulmonary effort is normal. No respiratory distress.   Skin:     General: Skin is warm and dry.   Neurological:      Mental Status: He is alert. Mental status is at baseline.       Significant Labs:  I have reviewed all pertinent lab results within the past 24 hours.  CBC:   Recent Labs   Lab 04/04/23  0240   WBC 4.34*   RBC 2.38*   HGB 7.5*   HCT 23.1*   *   MCV 97.1*   MCH 31.5*   MCHC 32.5     CMP:   Recent Labs   Lab 04/02/23  0540 04/03/23  0446 04/04/23  0240   *   < > 88   CALCIUM 8.8   < > 8.7   ALBUMIN 3.0*  --   --    PROT 6.9  --   --       < > 140   K 5.0   < > 4.4   CO2 19*   < > 23      < > 107   BUN 60*   < > 64*   CREATININE 7.43*   < > 7.58*   ALKPHOS 74  --   --    ALT 14*  --   --    AST 14*  --   --    BILITOT 0.3  --   --     < > = values in this interval not displayed.       Significant Diagnostics:  I have reviewed all pertinent imaging results/findings within the past 24 hours.

## 2023-04-04 NOTE — PLAN OF CARE
Consult for dme. Upright walker with seat for safe ambulation. Spoke with patient. Faxed to [facility]. Called [employer]. Insurance will not pay for an upright walker. Cost is around $450 for item. Will speak with Claudia in PT about equipment.    Consult for rollator. Spoke with [employer]. They will deliver.

## 2023-04-04 NOTE — PROGRESS NOTES
Patient denies shortness of breath.  Review of systems GI he denies nausea or vomiting, he states the food does not taste good to him.    Physical exam general patient is chronically ill-appearing but in no acute distress     Assessment/plan 1.  ESRD-patient has a right arm fistula has a good bruit to it  2.  HTN-blood pressure is 154/77, continue present treatment   3. Anemia-patient's hematocrit is 23%, this is down from 26% yesterday continue to monitor   4. Shortness of breath-this is much improved.    Patient is okay for discharge from my standpoint, I spoke to the surgery team about coming by check on his fistula while he is here.  He has an appointment on 11th for checkup and certainly does not need to stay in the hospital for this fistula check.

## 2023-04-04 NOTE — PHYSICIAN QUERY
PT Name: Pardeep Collins  MR #: [id number]     DOCUMENTATION CLARIFICATION     CDS/: PAULETTE ANSARI RN          Contact information:[email or web address]  This form is a permanent document in the medical record.     Query Date: April 4, 2023    By submitting this query, we are merely seeking further clarification of documentation.  Please utilize your independent clinical judgment when addressing the question(s) below.    The Medical Record contains the following   Indicators Supporting Clinical Findings Location in Medical Record   x Heart Failure documented ED Course  Medical decision-making:   Differential diagnosis includes acute CHF exacerbation, STEMI, NSTEMI, failure, pneumonia, bronchospasm.    Chest x-ray by my interpretation shows CHF.    Clinical Impression:  Final diagnoses:  Dyspnea (Primary)  Congestive heart failure, unspecified HF chronicity, unspecified heart failure type    Pulmonary  Shortness of breath  Likely related to renal failure and/or CHF   ED PN, Dr. Martinez, 04/02                            DCS, Dr. Quinonez, 04/04   x BNP BNP = 33,742   Lab results, 04/02   x EF/Echo Echo  The left ventricle is normal in size with concentric remodeling and low normal systolic function.  The estimated ejection fraction is 50%.  Indeterminate left ventricular diastolic function.  Normal right ventricular size with normal right ventricular systolic function.  Mild aortic regurgitation.  Moderate mitral regurgitation.  Moderate tricuspid regurgitation.  Normal central venous pressure (3 mmHg).  The estimated PA systolic pressure is 67 mmHg.  There are bilateral pleural effusions.   Cardiology results, 04/03   x Radiology findings Chest xray     FINDINGS:  The heart and mediastinum are stable in size and configuration.  The pulmonary vascularity is slightly increased with bilateral increased interstitial lung density.  No other lung infiltrates, effusions, pneumothorax or other abnormality is  demonstrated   Radiology results, 04/02   x Subjective/Objective Respiratory Conditions HPI: Mr. Collins is an 81yo man history of rheumatoid arthritis with bilateral knee and left shoulder pain, HTN, BPH, nocturia s/p UroLift, Stage 5 kidney disease s/p R AV fistula on 3/20/23 with nephrologist Dr. Aceves but missed several appointments who presents with shortness of breath.  He reports progressive shortness of breath over the last two days.  He denies leg swelling, chest pain, R arm pain or other complaints.      Review of Systems  Respiratory: Positive for shortness of breath and wheezing. Negative for chest tightness.    Physical Exam:   Breath sounds: wheezing and rales present.   H&P, Dr. Quinonez, 04/02   x Recent/Current MI Type 2 MI (myocardial infarction) H&P, Dr. Quinonez, 04/02    Heart Transplant, LVAD      Edema, JVD      Ascites     x Diuretics/Meds IV lasix 100 mg x 1 dose  IV lasix 40 mg bid   MAR, 04/02  MAR, 04/   x Other Treatment Supplemental O2 at 2lnc   Flowsheets, 04/02    Other       Heart failure is a clinical diagnosis which includes symptomatic fluid retention, elevated intracardiac pressures, and/or the inability of the heart to deliver adequate blood flow.    Heart Failure with reduced Ejection Fraction (HFrEF) or Systolic Heart Failure (loses ability to contract normally, EF is <40%)    Heart Failure with preserved Ejection Fraction (HFpEF) or Diastolic Heart Failure (stiff ventricles, does not relax properly, EF is >50%)     Heart Failure with Combined Systolic and Diastolic Failure (stiff ventricles, does not relax properly and EF is <50%)    Mid-range or mildly reduced ejection fraction (HFmrEF) is classified as systolic heart failure.  Congestive heart failure with a recovered EF is classified as Diastolic Heart Failure.  Common clues to acute exacerbation:  Rapidly progressive symptoms (w/in 2 weeks of presentation), using IV diuretics, using supplemental O2, pulmonary edema on  Xray, new or worsening pleural effusion, +JVD or other signs of volume overload, MI w/in 4 weeks, and/or BNP >500  The clinical guidelines noted are only system guidelines, and do not replace the providers clinical judgment.    Provider, please specify the diagnosis associated with the above clinical findings.    [  x ]  Acute Diastolic Heart Failure (HFpEF) - new diagnosis   [   ]  Acute Combined Systolic and Diastolic Heart Failure - new diagnosis   [   ]  Other (please specify): ___________________________________   [  ]  Clinically Undetermined           Please document in your progress notes daily for the duration of treatment until resolved and include in your discharge summary.    References:  American Heart Association editorial staff. (2017, May). Ejection Fraction Heart Failure Measurement. American Heart Association. https://www.heart.org/en/health-topics/heart-failure/diagnosing-heart-failure/ejection-fraction-heart-failure-measurement#:~:text=Ejection%20fraction%20(EF)%20is%20a,pushed%20out%20with%20each%20heartbeat  JASMINA Russell (2020, December 15). Heart failure with preserved ejection fraction: Clinical manifestations and diagnosis. [employer]. https://www.[employer].com/contents/heart-failure-with-preserved-ejection-fraction-clinical-manifestations-and-diagnosis.  ICD-10-CM/PCS Coding Clinic Third Quarter ICD-10, Effective with discharges: September 8, 2020 [facility] § Heart failure with mid-range or mildly reduced ejection fraction (2020).  ICD-10-CM/PCS Coding Clinic Third Quarter ICD-10, Effective with discharges: September 8, 2020 [facility] § Heart failure with recovered ejection fraction (2020).  Form No. [id number]

## 2023-04-04 NOTE — PT/OT/SLP EVAL
"Physical Therapy Evaluation    Patient Name:  Pardeep Collins   MRN:  [id number]    Recommendations:     Discharge Recommendations: home with home health   Discharge Equipment Recommendations: rollator   Barriers to discharge: None    Assessment:     Pardeep Collins is a 82 y.o. male admitted with a medical diagnosis of ESRD (end stage renal disease).  He presents with the following impairments/functional limitations: weakness, impaired endurance, impaired functional mobility, gait instability, pain. Pt reports difficulty with long distance ambulation d/t severity of bilat knee pain with activity. Pt has had >3falls this year. Pt would benefit from rollator for safe gait outside of home to reduce risk of falls.     Rehab Prognosis: Good; patient would benefit from acute skilled PT services to address these deficits and reach maximum level of function.    Recent Surgery: * No surgery found *      Plan:     During this hospitalization, patient to be seen 5 x/week to address the identified rehab impairments via gait training, therapeutic activities, therapeutic exercises and progress toward the following goals:    Plan of Care Expires:  05/04/23    Subjective     Chief Complaint: ESRD  Patient/Family Comments/goals: "I need something to help me walk"  Pain/Comfort:  Pain Rating 1: 0/10    Patients cultural, spiritual, Hoah[employer]ism conflicts given the current situation: no    Living Environment:  Home alone in 1 story home with 4 steps and bilat rails to enter.   Prior to admission, patients level of function was indep, no longer drives.  Equipment used at home: shower chair.  DME owned (not currently used): none.  Upon discharge, patient will have assistance from [facility].    Objective:     Communicated with Dr. Quinonez and LINDA Yadav prior to session.  Patient found supine with telemetry, peripheral IV  upon PT entry to room.    General Precautions: Standard, fall  Orthopedic Precautions:N/A   Braces:    Respiratory Status: " Room air    Exams:  Cognitive Exam:  Patient is oriented to Person, Place, Time, and Situation  Sensation:    -       Intact  RLE ROM: WFL  RLE Strength: WFL  LLE ROM: WFL  LLE Strength: WFL    Functional Mobility:  Bed Mobility:     Supine to Sit: modified independence  Transfers:     Sit to Stand:  stand by assistance and contact guard assistance with no AD  Gait: 80ft in room with RW, CGA to SBA with pt demo slight axial flexion  Balance: Good       AM-PAC 6 CLICK MOBILITY  Total Score:21       Treatment & Education:  Pt educated on PT role/POC.   Importance of OOB activity with staff assistance.  Importance of sitting up in the chair throughout the day as tolerated, especially for meals   Safety during functional t/f and mobility with use of LRAD  Multiple self-care tasks/functional mobility completed- assistance level noted above   All questions/concerns answered within PT scope of practice     Patient left up in chair with all lines intact and call button in reach.    GOALS:   Multidisciplinary Problems       Physical Therapy Goals          Problem: Physical Therapy    Goal Priority Disciplines Outcome Goal Variances Interventions   Physical Therapy Goal     PT, PT/OT Ongoing, Progressing     Description: Short Term Goals to be met by: 23    Patient will increase functional independence with mobility by performin. Supine to sit with independently  2. Sit to stand transfer with independently using no assistive device  3. Bed to chair transfer with independently using no assistive device  4. Gait  x 100 feet with supervision using Rolling walker  5. Lower extremity exercise program x30 reps per handout, with assistance as needed  6. Pt to negotiate 4 steps with bilat rails with supervision    Long Term Goals to be met by: 23    Pt will regain full independent functional mobility with lowest level of assistive device to return to home situation and prior activities of daily living.                         History:     Past Medical History:   Diagnosis Date    Elevated PSA     Gout, unspecified     Hypertension     Rheumatoid arthritis, unspecified        Past Surgical History:   Procedure Laterality Date    AV FISTULA PLACEMENT Right 3/20/2023    Procedure: CREATION, AV FISTULA;  Surgeon: Alfred Sierra MD;  Location: [facility];  Service: General;  Laterality: Right;  right basilic vein transposition    HAND SURGERY Left     urolift      in [place];       Time Tracking:     PT Received On: 04/04/23  PT Start Time: 0950     PT Stop Time: 1015  PT Total Time (min): 25 min     Billable Minutes: Evaluation low complexity      04/04/2023

## 2023-04-04 NOTE — PLAN OF CARE
Problem: Occupational Therapy  Goal: Occupational Therapy Goal  Description: STG:  Pt will perform grooming (I) at sink  Pt will bathe with (I)  Pt will perform UE dressing with (I)  Pt will perform LE dressing with (I)  Pt will transfer bed/chair/bsc with Mod I  Pt will perform standing task x 2 min with Mod I  Pt will tolerate 15 minutes of tx without fatigue      LT.Restore to max I with self care and mobility.     Outcome: Ongoing, Progressing

## 2023-04-04 NOTE — PLAN OF CARE
Problem: Adult Inpatient Plan of Care  Goal: Plan of Care Review  Outcome: Met  Goal: Patient-Specific Goal (Individualized)  Outcome: Met  Goal: Absence of Hospital-Acquired Illness or Injury  Outcome: Met  Goal: Optimal Comfort and Wellbeing  Outcome: Met  Goal: Readiness for Transition of Care  Outcome: Met     Problem: Hypertension Comorbidity  Goal: Blood Pressure in Desired Range  Outcome: Met

## 2023-04-04 NOTE — PT/OT/SLP EVAL
Occupational Therapy   Evaluation    Name: Pardeep Collins  MRN: [id number]  Admitting Diagnosis: ESRD (end stage renal disease)  Recent Surgery: * No surgery found *      Recommendations:     Discharge Recommendations: home with home health  Discharge Equipment Recommendations:   (to be determined)  Barriers to discharge:  None    Assessment:     Pardeep Collins is a 82 y.o. male with a medical diagnosis of ESRD (end stage renal disease).  He presents with no complaints. Pt agreed to OT evaluation. Performance deficits affecting function: weakness, impaired endurance, impaired self care skills, impaired functional mobility.      Rehab Prognosis: Good; patient would benefit from acute skilled OT services to address these deficits and reach maximum level of function.       Plan:     Patient to be seen 5 x/week to address the above listed problems via self-care/home management, therapeutic activities, therapeutic exercises  Plan of Care Expires:    Plan of Care Reviewed with: patient    Subjective     Chief Complaint: ESRD  Patient/Family Comments/goals: To return home.    Occupational Profile:  Living Environment: pt lives alone in 1 story home with 4 steps  Previous level of function: I with self care prior  Roles and Routines: I with daily activities  Equipment Used at Home: shower chair  Assistance upon Discharge: None stated    Pain/Comfort:  Pain Rating 1: 0/10  Pain Rating Post-Intervention 1: 0/10    Patients cultural, spiritual, [employer]ar[employer] conflicts given the current situation: no    Objective:     Communicated with: STEFANY Blackman prior to session.  Patient found HOB elevated with peripheral IV, telemetry upon OT entry to room.    General Precautions: Standard, fall  Orthopedic Precautions: N/A  Braces: N/A  Respiratory Status: Room air    Occupational Performance:    Bed Mobility:    Patient completed Supine to Sit with modified independence    Functional Mobility/Transfers:  Patient completed Sit <> Stand  Transfer with stand by assistance and contact guard assistance  with  rolling walker   Patient completed Bed <> Chair Transfer using Step Transfer technique with contact guard assistance with rolling walker  Functional Mobility: CGA with RW    Activities of Daily Living:  Upper Body Dressing: minimum assistance donning gown as a robe  Lower Body Dressing: NT. .    Cognitive/Visual Perceptual:  Cognitive/Psychosocial Skills:     -       Oriented to: Person, Place, Time, and Situation   -       Follows Commands/attention:Follows one-step commands  -       Communication: clear/fluent  Visual/Perceptual:      -wears reading glasses. Hearing wfl .    Physical Exam:  Balance:    -       I with EOB sittting, CGA with mobility  Skin integrity: Visible skin intact  Edema:  None noted  Sensation:    -       Intact  Motor Planning:    -       wfl  Dominant hand:    -       right  Upper Extremity Range of Motion:     -       Right Upper Extremity: WFL  -       Left Upper Extremity: WFL  Upper Extremity Strength:    -       Right Upper Extremity: WFL  -       Left Upper Extremity: WFL   Strength:    -       Right Upper Extremity: WFL  -       Left Upper Extremity: WFL    AMPAC 6 Click ADL:  AMPAC Total Score: 21    Treatment & Education:  OT evaluation completed. See eval for details.  Pt educated on OT role/POC.   Importance of OOB activity with staff assistance.  Importance of sitting up in the chair throughout the day as tolerated, especially for meals   Safety during functional t/f and mobility with use of Rollator  Importance of assisting with self-care activities   All questions/concerns answered within OT scope of practice     Patient left up in chair with all lines intact, call button in reach, and nurse notified    GOALS:   Multidisciplinary Problems       Occupational Therapy Goals          Problem: Occupational Therapy    Goal Priority Disciplines Outcome Interventions   Occupational Therapy Goal     OT, PT/OT  Ongoing, Progressing    Description: STG:  Pt will perform grooming (I) at sink  Pt will bathe with (I)  Pt will perform UE dressing with (I)  Pt will perform LE dressing with (I)  Pt will transfer bed/chair/bsc with Mod I  Pt will perform standing task x 2 min with Mod I  Pt will tolerate 15 minutes of tx without fatigue      LT.Restore to max I with self care and mobility.                          History:     Past Medical History:   Diagnosis Date    Elevated PSA     Gout, unspecified     Hypertension     Rheumatoid arthritis, unspecified          Past Surgical History:   Procedure Laterality Date    AV FISTULA PLACEMENT Right 3/20/2023    Procedure: CREATION, AV FISTULA;  Surgeon: Alfred Sierra MD;  Location: [facility];  Service: General;  Laterality: Right;  right basilic vein transposition    HAND SURGERY Left     urolift      in [place];       Time Tracking:     OT Date of Treatment: 23  OT Start Time: 954  OT Stop Time: 1014  OT Total Time (min): 20 min    Billable Minutes:Evaluation 2023

## 2023-04-04 NOTE — ASSESSMENT & PLAN NOTE
Patient has a current diagnosis of hypertensive urgency (without evidence of end organ damage) which is uncontrolled.  Latest blood pressure and vitals reviewed-   Temp:  [97.7 °F (36.5 °C)-98.8 °F (37.1 °C)]   Pulse:  [85-94]   Resp:  [18-20]   BP: (142-179)/(77-90)   SpO2:  [94 %-98 %] .   Patient currently on IV antihypertensives.   Home meds for hypertension were reviewed and noted below.   Hypertension Medications             NIFEdipine (ADALAT CC) 30 MG TbSR Take 30 mg by mouth once daily.          Medication adjustment for hospital antihypertensives is as follows- prn hydralazine and clonidine.     Will aim for controlled BP reduction by medications noted above. Monitor and mitigate end organ damage as indicated.

## 2023-04-04 NOTE — DISCHARGE SUMMARY
[facility]  [facility] Medicine  Discharge Summary      Patient Name: Pardeep Collins  MRN: [id number]  KIERA: [id number]  Patient Class: IP- Inpatient  Admission Date: 4/2/2023  Hospital Length of Stay: 2 days  Discharge Date and Time:  04/04/2023 12:29 PM  Attending Physician: Nely Quinonez MD   Discharging Provider: Nely Quinonez MD  Primary Care Provider: Primary Doctor No    Primary Care Team: Networked reference to record PCT     HPI:   Mr. Collins is an 83yo man history of rheumatoid arthritis with bilateral knee and left shoulder pain, HTN, BPH, nocturia s/p UroLift, Stage 5 kidney disease s/p R AV fistula on 3/20/23 with nephrologist Dr. Aceves but missed several appointments who presents with shortness of breath.  He reports progressive shortness of breath over the last two days.  He denies leg swelling, chest pain, R arm pain or other complaints.      He follows at the cardiology institute [facility] and receives care at [facility].  PCP is Parker Mccrary.   He states that he had  a LHC and echo within the last year.  This was performed after he had severe pain in his left shoulder.  The pain is intermittent (once every couple months) but it is not as severe as it once was.    He states he had COVID-19 at the beginning of the pandemic a LAIRD and has had intermittent pain since then.  He also states he fell off a tree 1-2 years ago and has some persistent pain related to that.  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      At baseline, he walks.  He states he has pain in both knees and that compromises his ability to walk.  He is afraid to have surgery given his multiple other medical problems.  He has never taken medication for rheumatoid arthritis.  He has never seen a rheumatologist.  He states a chiropractor told him he has rheumatoid arthritis.  He denies stiffness in his hands in the mornings.           He states he no longer  drives so it is quite costly for him to come to appointments. This is the reason he has missed some appointments.          * No surgery found *      Hospital Course:   4/3:  Patient is improved with IV diuresis.  He may not require urgent dialysis at this time.       Goals of Care Treatment Preferences:  Code Status: Full Code      Consults:   Consults (From admission, onward)        Status Ordering Provider     Inpatient consult to Social Work  Once        Provider:  (Not yet assigned)    Ordered SARA GILES     Inpatient consult to Social Work  Once        Provider:  (Not yet assigned)    Completed SARA GILES     Inpatient consult to Social Work  Once        Provider:  (Not yet assigned)    Completed SARA GILES     Inpatient consult to General Surgery  Once        Provider:  Alfred Sierra MD    Acknowledged ADOLFO QUESADA     Inpatient consult to Nephrology  Once        Provider:  Flaquito Boykin MD    Acknowledged SARA GILES          Pulmonary  Shortness of breath  Likely related to renal failure and/or CHF.        Cardiac/Vascular  Type 2 MI (myocardial infarction)  Troponin 250s.    Shortness of breath and mild chest discomfort.   [facility] at [facility] institute in the last year, per patient.        Hypertensive urgency  Patient has a current diagnosis of hypertensive urgency (without evidence of end organ damage) which is uncontrolled.  Latest blood pressure and vitals reviewed-   Temp:  [97.7 °F (36.5 °C)-98.8 °F (37.1 °C)]   Pulse:  [85-94]   Resp:  [18-20]   BP: (142-179)/(77-90)   SpO2:  [94 %-98 %] .   Patient currently on IV antihypertensives.   Home meds for hypertension were reviewed and noted below.   Hypertension Medications             NIFEdipine (ADALAT CC) 30 MG TbSR Take 30 mg by mouth once daily.          Medication adjustment for hospital antihypertensives is as follows- prn hydralazine and clonidine.     Will aim for controlled BP reduction by  medications noted above. Monitor and mitigate end organ damage as indicated.    Primary hypertension  On home nifedipine.        Renal/  * ESRD (end stage renal disease)  Shortness of breath likely related ESRD.   RUE AV graft placed on 3/20/23.   Nephrology consulted.    Followed by Dr. Aceves as outpatient.    Dr. Boykin consulted.      4/3:  Patient is improving with IV diuresis and does not appear to require hemodialysis at this time.    F/u with Dr. Aceves as outpatient.        Nocturia  H/o UroLift.    Follows with Dr. Haile.   Bladder scan ordered.    Finney catheter if needed.       BPH (benign prostatic hyperplasia)  Elevated PSA in the past.    Monitor for urinary retention.        Oncology  Anemia  Likely of chronic disease.          Final Active Diagnoses:    Diagnosis Date Noted POA    PRINCIPAL PROBLEM:  ESRD (end stage renal disease) [N18.6] 04/02/2023 Yes    Shortness of breath [R06.02] 04/03/2023 Yes    Anemia [D64.9] 04/03/2023 Yes    Primary hypertension [I10] 04/02/2023 Yes    BPH (benign prostatic hyperplasia) [N40.0] 04/02/2023 Yes    Nocturia [R35.1] 04/02/2023 Yes    Hypertensive urgency [I16.0] 04/02/2023 Yes    Type 2 MI (myocardial infarction) [I21.A1] 04/02/2023 Yes      Problems Resolved During this Admission:       Discharged Condition: fair    [place] [facility]    Follow Up:   Follow-up Information     Primary Doctor No Follow up in 1 week(s).           Chandler Aceves Jr, MD Follow up in 1 week(s).    Specialty: Internal Medicine  Why: EVELIN on CKD.  Contact information:  [address] [place] [address]  [facility] [place] [id number] [phone]                       Patient Instructions:   No discharge procedures on file.    Significant Diagnostic Studies: Labs:   BMP:   Recent Labs   Lab 04/03/23  0446 04/04/23  0240   * 88    140   K 4.3 4.4    107   CO2 20* 23   BUN 63* 64*   CREATININE 7.48* 7.58*   CALCIUM 9.0 8.7   MG 3.2* 3.2*    and  CBC   Recent Labs   Lab 04/03/23  0446 04/04/23  0240   WBC 5.17 4.34*   HGB 8.1* 7.5*   HCT 25.9* 23.1*   * 149*       Pending Diagnostic Studies:     Procedure Component Value Units Date/Time    EKG 12-lead [[id number]]     Order Status: Sent Lab Status: No result     EKG 12-lead [[id number]] Collected: 04/02/23 0529    Order Status: Sent Lab Status: In process Updated: 04/02/23 0709    Narrative:      Test Reason : R06.00,    Vent. Rate : 108 BPM     Atrial Rate : 000 BPM     P-R Int : 186 ms          QRS Dur : 084 ms      QT Int : 314 ms       P-R-T Axes : 074 066 053 degrees     QTc Int : 398 ms     ACUTE MI / ISCHEMIA    SIGNIFICANT ARRHYTHMIA   Sinus tachycardia with frequent multifocal PVCs  Lateral ST-T abnormality suggests myocardial injury/ischemia  Abnormal ECG      Referred By: AAAREFERR   SELF           Confirmed By:          Medications:  Reconciled Home Medications:      Medication List      ASK your doctor about these medications    desmopressin 0.2 MG tablet  Commonly known as: DDAVP  Take 1 tablet (200 mcg total) by mouth nightly.     HYDROcodone-acetaminophen 7.5-325 mg per tablet  Commonly known as: NORCO  Take 1 tablet by mouth every 6 (six) hours as needed for Pain.     NIFEdipine 30 MG Tbsr  Commonly known as: ADALAT CC  Take 30 mg by mouth once daily.            Indwelling Lines/Drains at time of discharge:   Lines/Drains/Airways     Drain  Duration                Hemodialysis AV Fistula 03/20/23 Right upper arm 15 days                Time spent on the discharge of patient: 46 minutes         Nely Quinonez MD  Department of Hospital Medicine  [facility]

## 2023-04-04 NOTE — PHYSICIAN QUERY
PT Name: Pardeep Collins  MR #: [id number]    DOCUMENTATION CLARIFICATION     CDS/: PAULETTE MON  MSN RN            Contact information: [email or web address]    This form is a permanent document in the medical record.     Query Date: April 4, 2023    By submitting this query, we are merely seeking further clarification of documentation.. Please utilize your independent clinical judgment when addressing the question(s) below.    The medical record contains the following:   Indicators  Supporting Clinical Findings Location in Medical Record   x Energy Intake Energy Intake (Malnutrition): less than or equal to 50% for greater than or equal to 5 days   Nutrition PN, Hall RD, 04/03    Weight Loss     x Fat Loss Subcutaneous Fat (Malnutrition): moderate depletion    Upper Arm Region (Subcutaneous Fat Loss): severe depletion     Subcutaneous Fat Loss (Final Summary): moderate protein-calorie malnutrition   Nutrition PN, Hall RD, 04/03   x Muscle Loss Muscle Mass (Malnutrition): moderate depletion     [place] Region (Muscle Loss): moderate depletion    Clavicle Bone Region (Muscle Loss): moderate depletion    Scapular Bone Region (Muscle Loss): severe depletion    Dorsal Hand (Muscle Loss): severe depletion    Anterior Thigh Region (Muscle Loss): severe depletion    Posterior Calf Region (Muscle Loss): moderate depletion     Muscle Loss Evaluation (Final Summary): severe protein-calorie malnutrition   Nutrition PN, Hall RD, 04/03    Edema/Fluid Accumulation      Reduced  Strength (by dynamometer)     X Weight, BMI, Usual Body Weight Weight (lb): 139 lb  Ideal Body Weight (IBW), Male: 154 lb    % Ideal Body Weight, Male (lb): 90.26 %    BMI (Calculated): 21.1 Nutrition PN, Hall RD, 04/03    Delayed Wound Healing     x Registered Dietician Diagnosis Patient meets ASPEN criteria for moderate to severe protein-calorie malnutrition    Malnutrition  Is Patient Malnourished: Yes   Malnutrition  Assessment  Malnutrition Type: chronic illness       Nutrition Daniel GIRALDO RD, 04/03   x Acute or Chronic Illness Assessment/Plan:  ESRD  Type 2 MI (myocardial infarction)  Hypertensive urgency  Nocturia  BPH (benign prostatic hyperplasia)  Primary hypertension   H&P, Dr. Quinonez, 04/02    Social or Environmental Circumstances     x Treatment Recommendation/Intervention: Recommend continue with renal diet. Recommend addition of Suplena and change to Nepro once patient starts dialysis.   Nutrition Daniel GIRALDO RD, 04/03    Other       Academy of Nutrition and Dietetics (Academy) and the American Society for Parenteral and Enteral Nutrition (A.S.P.E.N.) Clinical Characteristics to support Malnutrition   Malnutrition in the Context of Acute Illness or Injury Malnutrition in the Context of Chronic Illness or Injury Malnutrition in the Context of Social or Environmental Circumstances   Malnutrition Level Moderate Severe Moderate Severe   Moderate   Severe   Energy Intake <75%                   >7 days <50%                 >5 days <75%           >1 month <75%                      >1 month   <75% for >3 months   <50% for >1 month   Weight Loss   1-2% in 1 week >2% in 1 week 5% in 1 month >5% in 1 month 5% in 1 month >5% in 1 month    5% in 1 month >5% in 1 month 7.5% in 3 months >7.5% in 3 months 7.5% in 3 months >7.5% in 3 months    7.5% in 3 months >7.5% in 3 months 10% in 6 months >10% in 6 months 10% in 6 months >10% in 6 months        20% in 1 year                    >20% in 1 year                                                                  20% in 1 year                            >20% in 1 year                                                  Subcutaneous Fat Loss Mild  Moderate  Mild  Severe    Mild   Severe   Muscle Loss Mild  Moderate  Mild  Severe    Mild   Severe   Edema/Fluid Accumulation Mild Moderate to severe  Mild  Severe   Mild   Severe   Reduced  Strength         (based on standards supplied by   of dynamometer) N/A Measurably reduced N/A Measurably reduced N/A Measurably reduced     Criteria for mild malnutrition is defined as 1 characteristic outlined above within the established moderate or severe parameters.  A minimum of 2 out of the 6 characteristics noted above are recommended for a diagnosis of moderate or severe malnutrition.  Chronic illness/injury is a disease/condition lasting 3 months or longer.    The noted clinical guidelines are only system guidelines and do not replace the providers clinical judgment.    Provider, please specify diagnosis or diagnoses associated with above clinical findings.    [ x ] Moderate Malnutrition - a minimum of 2 of the 6 moderate malnutrition characteristics noted above    [  ] Severe Malnutrition - a minimum of 2 of the 6 severe malnutrition characteristics noted above    [  ] Other Nutritional Diagnosis (please specify): _______   [  ] Clinically Undetermined         Please document in your progress notes daily for the duration of treatment until resolved and  include in your discharge summary.      References:    HELADIO Carlin, & NOHEMY Hinojosa (2022, April). Assessment and management of anorexia and cachexia in palliative care. Retrieved May 23, 2022, from https://www.[employer]/contents/assessment-and-management-of-anorexia-and-cachexia-in-palliative-care?chozhAva=5436&source=see_link     MERRILL Real, PhD, RD, INOCENCIO, WALTER Soliman, PhD, RN, TITA Howard MD, PhD, Suman NEFF A., MS, RD, [facility], KAILA Valdes, MS, RD, The Academy Malnutrition Work Group, The A.S.P.E.N. Board of Directors. (2012). Consensus Statement: Academy of Nutrition and Dietetics and American Society for Parenteral and Enteral Nutrition: Characteristics Recommended for the Identification and Documentation of Adult Malnutrition (Undernutrition). Journal of Parenteral and Enteral Nutrition, 36(3), 275-283. doi:10.1177/8849445842035556     Form No. [id number]

## 2023-04-04 NOTE — HPI
Patient admitted for dyspnea with worsening renal function.  General surgery consulted to evaluate AV graft.  Graft placed by Dr. Sierra on 03/20/2023.  Ultrasound performed of the right upper extremity yesterday.  Graft not mature enough to be used for hemodialysis yet.  If the patient needs hemodialysis in the interim, will need HD cath.

## 2023-04-04 NOTE — PROGRESS NOTES
[facility]  [facility] Medicine  Progress Note    Patient Name: Pardeep Collins  MRN: [id number]  Patient Class: IP- Inpatient   Admission Date: 4/2/2023  Length of Stay: 1 days  Attending Physician: Nely Quinonez MD  Primary Care Provider: Primary Doctor No        Subjective:     Principal Problem:ESRD (end stage renal disease)    HPI:  Mr. Collins is an 83yo man history of rheumatoid arthritis with bilateral knee and left shoulder pain, HTN, BPH, nocturia s/p UroLift, Stage 5 kidney disease s/p R AV fistula on 3/20/23 with nephrologist Dr. Aceves but missed several appointments who presents with shortness of breath.  He reports progressive shortness of breath over the last two days.  He denies leg swelling, chest pain, R arm pain or other complaints.      He follows at the cardiology institute [facility] and receives care at [facility].  PCP is Parker Mccrary.   He states that he had  a LHC and echo within the last year.  This was performed after he had severe pain in his left shoulder.  The pain is intermittent (once every couple months) but it is not as severe as it once was.    He states he had COVID-19 at the beginning of the pandemic a LAIRD and has had intermittent pain since then.  He also states he fell off a tree 1-2 years ago and has some persistent pain related to that.  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      At baseline, he walks.  He states he has pain in both knees and that compromises his ability to walk.  He is afraid to have surgery given his multiple other medical problems.  He has never taken medication for rheumatoid arthritis.  He has never seen a rheumatologist.  He states a chiropractor told him he has rheumatoid arthritis.  He denies stiffness in his hands in the mornings.           He states he no longer drives so it is quite costly for him to come to appointments. This is the reason he has missed some appointments.           Overview/Hospital Course:  4/3:  Patient is improved with IV diuresis.  He may not require urgent dialysis at this time.      Interval History:     Review of Systems   Constitutional:  Negative for activity change, chills, fatigue and fever.   HENT:  Negative for congestion and sore throat.    Respiratory:  Positive for shortness of breath and wheezing. Negative for chest tightness.    Gastrointestinal:  Negative for abdominal distention, abdominal pain and diarrhea.   Endocrine: Negative for cold intolerance and heat intolerance.   Genitourinary:  Negative for dysuria.   Musculoskeletal:  Negative for arthralgias and back pain.   Skin:  Negative for color change and rash.   Neurological:  Negative for dizziness, tremors and headaches.   Psychiatric/Behavioral:  Negative for agitation. The patient is not nervous/anxious.    Objective:     Vital Signs (Most Recent):  Temp: 98.1 °F (36.7 °C) (04/03/23 1901)  Pulse: 85 (04/03/23 1901)  Resp: 18 (04/03/23 1901)  BP: (!) 146/87 (notified nurse) (04/03/23 1901)  SpO2: 98 % (04/03/23 1901)   Vital Signs (24h Range):  Temp:  [97.6 °F (36.4 °C)-99.8 °F (37.7 °C)] 98.1 °F (36.7 °C)  Pulse:  [] 85  Resp:  [18-20] 18  SpO2:  [96 %-98 %] 98 %  BP: (129-192)/() 146/87     Weight: 63 kg (139 lb)  Body mass index is 21.13 kg/m².    Intake/Output Summary (Last 24 hours) at 4/3/2023 2125  Last data filed at 4/3/2023 0600  Gross per 24 hour   Intake 850 ml   Output 700 ml   Net 150 ml      Physical Exam  Vitals and nursing note reviewed.   Constitutional:       Appearance: He is not ill-appearing.   HENT:      Head: Normocephalic.      Right Ear: Tympanic membrane normal.      Nose: Nose normal.   Cardiovascular:      Rate and Rhythm: Tachycardia present. Rhythm irregular.      Pulses: Normal pulses.      Heart sounds: Normal heart sounds.   Pulmonary:      Effort: Pulmonary effort is normal.      Breath sounds: No wheezing or rales.   Abdominal:      General:  Abdomen is flat. Bowel sounds are normal.      Palpations: Abdomen is soft.   Musculoskeletal:         General: No swelling.      Cervical back: Normal range of motion.      Right lower leg: No edema.      Left lower leg: No edema.   Skin:     General: Skin is warm and dry.   Neurological:      General: No focal deficit present.      Mental Status: He is alert and oriented to person, place, and time.   Psychiatric:         Mood and Affect: Mood normal.       Significant Labs: All pertinent labs within the past 24 hours have been reviewed.    Significant Imaging: I have reviewed all pertinent imaging results/findings within the past 24 hours.      Assessment/Plan:      * ESRD (end stage renal disease)  Shortness of breath likely related ESRD.   RUE AV graft placed on 3/20/23.   Nephrology consulted.    Followed by Dr. Aceves as outpatient.    Dr. Boykin consulted.      4/3:  Patient is improving with IV diuresis and does not appear to require hemodialysis at this time.      Type 2 MI (myocardial infarction)  Troponin 250s.    Shortness of breath and mild chest discomfort.   Negative cath at cardiac institute in the last year, per patient.        Hypertensive urgency  Patient has a current diagnosis of hypertensive urgency (without evidence of end organ damage) which is uncontrolled.  Latest blood pressure and vitals reviewed-   Temp:  [97.7 °F (36.5 °C)]   Pulse:  []   Resp:  [17-31]   BP: (143-218)/()   SpO2:  [95 %-100 %] .   Patient currently on IV antihypertensives.   Home meds for hypertension were reviewed and noted below.   Hypertension Medications             NIFEdipine (ADALAT CC) 30 MG TbSR Take 30 mg by mouth once daily.          Medication adjustment for hospital antihypertensives is as follows- prn hydralazine and clonidine.     Will aim for controlled BP reduction by medications noted above. Monitor and mitigate end organ damage as indicated.    Nocturia  H/o UroLift.    Follows with Dr. Haile.    Bladder scan ordered.    Finney catheter if needed.       BPH (benign prostatic hyperplasia)  Elevated PSA in the past.    Monitor for urinary retention.        Primary hypertension  On home nifedipine.          VTE Risk Mitigation (From admission, onward)         Ordered     heparin (porcine) injection 5,000 Units  Every 12 hours         04/02/23 0937     Place sequential compression device  Until discontinued         04/02/23 0937     IP VTE HIGH RISK PATIENT  Once         04/02/23 0937                Discharge Planning   RASHAD:      Code Status: Full Code   Is the patient medically ready for discharge?:     Reason for patient still in hospital (select all that apply): Treatment  Discharge Plan A: Home                  Nely Quinonez MD  Department of Hospital Medicine   [facility]

## 2023-04-04 NOTE — SUBJECTIVE & OBJECTIVE
Interval History:     Review of Systems   Constitutional:  Negative for activity change, chills, fatigue and fever.   HENT:  Negative for congestion and sore throat.    Respiratory:  Positive for shortness of breath and wheezing. Negative for chest tightness.    Gastrointestinal:  Negative for abdominal distention, abdominal pain and diarrhea.   Endocrine: Negative for cold intolerance and heat intolerance.   Genitourinary:  Negative for dysuria.   Musculoskeletal:  Negative for arthralgias and back pain.   Skin:  Negative for color change and rash.   Neurological:  Negative for dizziness, tremors and headaches.   Psychiatric/Behavioral:  Negative for agitation. The patient is not nervous/anxious.    Objective:     Vital Signs (Most Recent):  Temp: 98.1 °F (36.7 °C) (04/03/23 1901)  Pulse: 85 (04/03/23 1901)  Resp: 18 (04/03/23 1901)  BP: (!) 146/87 (notified nurse) (04/03/23 1901)  SpO2: 98 % (04/03/23 1901)   Vital Signs (24h Range):  Temp:  [97.6 °F (36.4 °C)-99.8 °F (37.7 °C)] 98.1 °F (36.7 °C)  Pulse:  [] 85  Resp:  [18-20] 18  SpO2:  [96 %-98 %] 98 %  BP: (129-192)/() 146/87     Weight: 63 kg (139 lb)  Body mass index is 21.13 kg/m².    Intake/Output Summary (Last 24 hours) at 4/3/2023 2125  Last data filed at 4/3/2023 0600  Gross per 24 hour   Intake 850 ml   Output 700 ml   Net 150 ml      Physical Exam  Vitals and nursing note reviewed.   Constitutional:       Appearance: He is not ill-appearing.   HENT:      Head: Normocephalic.      Right Ear: Tympanic membrane normal.      Nose: Nose normal.   Cardiovascular:      Rate and Rhythm: Tachycardia present. Rhythm irregular.      Pulses: Normal pulses.      Heart sounds: Normal heart sounds.   Pulmonary:      Effort: Pulmonary effort is normal.      Breath sounds: No wheezing or rales.   Abdominal:      General: Abdomen is flat. Bowel sounds are normal.      Palpations: Abdomen is soft.   Musculoskeletal:         General: No swelling.      Cervical  back: Normal range of motion.      Right lower leg: No edema.      Left lower leg: No edema.   Skin:     General: Skin is warm and dry.   Neurological:      General: No focal deficit present.      Mental Status: He is alert and oriented to person, place, and time.   Psychiatric:         Mood and Affect: Mood normal.       Significant Labs: All pertinent labs within the past 24 hours have been reviewed.    Significant Imaging: I have reviewed all pertinent imaging results/findings within the past 24 hours.

## 2023-04-06 ENCOUNTER — TELEPHONE (OUTPATIENT)
Dept: ORTHOPEDICS | Facility: HOSPITAL | Age: 83
End: 2023-04-06
Payer: MEDICARE

## 2023-04-11 ENCOUNTER — OFFICE VISIT (OUTPATIENT)
Dept: SURGERY | Facility: CLINIC | Age: 83
End: 2023-04-11
Attending: SURGERY
Payer: MEDICARE

## 2023-04-11 DIAGNOSIS — Z09 FOLLOW-UP EXAMINATION, FOLLOWING OTHER SURGERY: Primary | ICD-10-CM

## 2023-04-11 PROCEDURE — 99024 POSTOP FOLLOW-UP VISIT: CPT | Mod: ,,, | Performed by: SURGERY

## 2023-04-11 PROCEDURE — 1159F PR MEDICATION LIST DOCUMENTED IN MEDICAL RECORD: ICD-10-PCS | Mod: CPTII,,, | Performed by: SURGERY

## 2023-04-11 PROCEDURE — 1159F MED LIST DOCD IN RCRD: CPT | Mod: CPTII,,, | Performed by: SURGERY

## 2023-04-11 PROCEDURE — 99024 PR POST-OP FOLLOW-UP VISIT: ICD-10-PCS | Mod: ,,, | Performed by: SURGERY

## 2023-04-11 PROCEDURE — 99213 OFFICE O/P EST LOW 20 MIN: CPT | Mod: PBBFAC | Performed by: SURGERY

## 2023-04-11 NOTE — LETTER
April 11, 2023    Pardeep Collins  [address]  [place] [address]             [facility]  [address] [place] [address] [place] [address]  Phone: [phone]  Fax: [phone] To whom it may concern:    Mr. Collins was seen for a follow up AVF placement. His AVF may be used starting on 05/01/2023.       If you have any questions or concerns, please don't hesitate to call me at [phone].    Sincerely,      Alfred Sierra MD, FACS

## 2023-04-11 NOTE — PROGRESS NOTES
Post-operative Note    HPI:  The patient is status post right basilic vein transposition about 2 weeks ago.  Went to the hospital about a week ago for some fluid overload issues and was treated conservatively without starting dialysis.  He understands he will need least about 2-3 more weeks before they could start accessing his fistula in his arm.    PHYSICAL EXAM:    Incision well healed clean dry intact.  Good thrill.  Still requires and hemo weeks.    Recent Results (from the past 504 hour(s))   Comprehensive metabolic panel    Collection Time: 04/02/23  5:40 AM   Result Value Ref Range    Sodium 139 136 - 145 mmol/L    Potassium 5.0 3.5 - 5.1 mmol/L    Chloride 107 98 - 107 mmol/L    CO2 19 (L) 21 - 32 mmol/L    Anion Gap 18 (H) 7 - 16 mmol/L    Glucose 123 (H) 74 - 106 mg/dL    BUN 60 (H) 7 - 18 mg/dL    Creatinine 7.43 (H) 0.70 - 1.30 mg/dL    BUN/Creatinine Ratio 8 6 - 20    Calcium 8.8 8.5 - 10.1 mg/dL    Total Protein 6.9 6.4 - 8.2 g/dL    Albumin 3.0 (L) 3.5 - 5.0 g/dL    Globulin 3.9 2.0 - 4.0 g/dL    A/G Ratio 0.8     Bilirubin, Total 0.3 >0.0 - 1.2 mg/dL    Alk Phos 74 45 - 115 U/L    ALT 14 (L) 16 - 61 U/L    AST 14 (L) 15 - 37 U/L    eGFR 7 (L) >=60 mL/min/1.73m²   NT-Pro Natriuretic Peptide    Collection Time: 04/02/23  5:40 AM   Result Value Ref Range    ProBNP 33,742 (H) 1 - 450 pg/mL   APTT    Collection Time: 04/02/23  5:40 AM   Result Value Ref Range    PTT 24.4 (L) 25.2 - 37.3 seconds   Protime-INR    Collection Time: 04/02/23  5:40 AM   Result Value Ref Range    PT 14.2 11.7 - 14.7 seconds    INR 1.14 <=3.30   Magnesium    Collection Time: 04/02/23  5:40 AM   Result Value Ref Range    Magnesium 3.3 (H) 1.7 - 2.3 mg/dL   Troponin I    Collection Time: 04/02/23  5:40 AM   Result Value Ref Range    Troponin I High Sensitivity 244.8 (HH) <=60.4 pg/mL   CBC with Differential    Collection Time: 04/02/23  5:40 AM    Result Value Ref Range    WBC 6.73 4.50 - 11.00 K/uL    RBC 2.68 (L) 4.60 - 6.20 M/uL    Hemoglobin 8.2 (L) 13.5 - 18.0 g/dL    Hematocrit 26.7 (L) 40.0 - 54.0 %    MCV 99.6 (H) 80.0 - 96.0 fL    MCH 30.6 27.0 - 31.0 pg    MCHC 30.7 (L) 32.0 - 36.0 g/dL    RDW 12.0 11.5 - 14.5 %    Platelet Count 159 150 - 400 K/uL    MPV 13.4 (H) 9.4 - 12.4 fL    Neutrophils % 68.3 (H) 53.0 - 65.0 %    Lymphocytes % 23.0 (L) 27.0 - 41.0 %    Monocytes % 5.1 2.0 - 6.0 %    Eosinophils % 3.0 1.0 - 4.0 %    Basophils % 0.3 0.0 - 1.0 %    Immature Granulocytes % 0.3 0.0 - 0.4 %    nRBC, Auto 0.0 <=0.0 %    Neutrophils, Abs 4.60 1.80 - 7.70 K/uL    Lymphocytes, Absolute 1.55 1.00 - 4.80 K/uL    Monocytes, Absolute 0.34 0.00 - 0.80 K/uL    Eosinophils, Absolute 0.20 0.00 - 0.50 K/uL    Basophils, Absolute 0.02 0.00 - 0.20 K/uL    Immature Granulocytes, Absolute 0.02 0.00 - 0.04 K/uL    nRBC, Absolute 0.00 <=0.00 x10e3/uL    Diff Type Auto    Troponin I    Collection Time: 04/02/23  8:15 AM   Result Value Ref Range    Troponin I High Sensitivity 250.2 (HH) <=60.4 pg/mL   Basic Metabolic Panel (BMP)    Collection Time: 04/03/23  4:46 AM   Result Value Ref Range    Sodium 139 136 - 145 mmol/L    Potassium 4.3 3.5 - 5.1 mmol/L    Chloride 106 98 - 107 mmol/L    CO2 20 (L) 21 - 32 mmol/L    Anion Gap 17 (H) 7 - 16 mmol/L    Glucose 116 (H) 74 - 106 mg/dL    BUN 63 (H) 7 - 18 mg/dL    Creatinine 7.48 (H) 0.70 - 1.30 mg/dL    BUN/Creatinine Ratio 8 6 - 20    Calcium 9.0 8.5 - 10.1 mg/dL    eGFR 7 (L) >=60 mL/min/1.73m²   Magnesium    Collection Time: 04/03/23  4:46 AM   Result Value Ref Range    Magnesium 3.2 (H) 1.7 - 2.3 mg/dL   CBC with Differential    Collection Time: 04/03/23  4:46 AM   Result Value Ref Range    WBC 5.17 4.50 - 11.00 K/uL    RBC 2.57 (L) 4.60 - 6.20 M/uL    Hemoglobin 8.1 (L) 13.5 - 18.0 g/dL    Hematocrit 25.9 (L) 40.0 - 54.0 %    .8 (H) 80.0 - 96.0 fL    MCH 31.5 (H) 27.0 - 31.0 pg    MCHC 31.3 (L) 32.0 - 36.0  g/dL    RDW 12.1 11.5 - 14.5 %    Platelet Count 147 (L) 150 - 400 K/uL    MPV 13.0 (H) 9.4 - 12.4 fL    Neutrophils % 66.6 (H) 53.0 - 65.0 %    Lymphocytes % 22.2 (L) 27.0 - 41.0 %    Monocytes % 7.5 (H) 2.0 - 6.0 %    Eosinophils % 2.9 1.0 - 4.0 %    Basophils % 0.6 0.0 - 1.0 %    Immature Granulocytes % 0.2 0.0 - 0.4 %    nRBC, Auto 0.0 <=0.0 %    Neutrophils, Abs 3.44 1.80 - 7.70 K/uL    Lymphocytes, Absolute 1.15 1.00 - 4.80 K/uL    Monocytes, Absolute 0.39 0.00 - 0.80 K/uL    Eosinophils, Absolute 0.15 0.00 - 0.50 K/uL    Basophils, Absolute 0.03 0.00 - 0.20 K/uL    Immature Granulocytes, Absolute 0.01 0.00 - 0.04 K/uL    nRBC, Absolute 0.00 <=0.00 x10e3/uL    Diff Type Auto    Echo    Collection Time: 04/03/23  7:11 AM   Result Value Ref Range    BSA 1.74 m2    Right Atrial Pressure (from IVC) 3 mmHg    EF 50 %    Left Ventricular Outflow Tract Mean Gradient 2.00 mmHg    AORTIC VALVE CUSP SEPERATION 1.78 cm    LVIDd 4.69 3.5 - 6.0 cm    IVS 1.00 0.6 - 1.1 cm    Posterior Wall 1.11 (A) 0.6 - 1.1 cm    Ao root annulus 2.60 cm    LVIDs 3.92 2.1 - 4.0 cm    FS 16 28 - 44 %    IVC ostium 1.4 cm    LV mass 176.38 g    LA size 3.50 cm    RVDD 3.50 cm    TAPSE 1.80 cm    Left Ventricle Relative Wall Thickness 0.47 cm    AV regurgitation pressure 1/2 time 644 ms    AV mean gradient 5 mmHg    AV valve area 1.57 cm2    AV Velocity Ratio 0.67     AV index (prosthetic) 0.62     MV valve area p 1/2 method 4.07 cm2    MV valve area by continuity eq 0.43 cm2    E wave deceleration time 146.00 msec    LVOT diameter 1.80 cm    LVOT area 2.5 cm2    LVOT peak el 1.0 m/s    LVOT peak VTI 16.00 cm    Ao peak el 1.5 m/s    Ao VTI 26.00 cm    LVOT stroke volume 40.69 cm3    AV peak gradient 9 mmHg    MV peak gradient 79 mmHg    TV rest pulmonary artery pressure 67 mmHg    MV Peak E El 2.90 m/s    AR Max El 4.71 m/s    TR Max El 4.00 m/s    MV VTI 94.9 cm    MV stenosis pressure 1/2 time 54.00 ms    LV Systolic Volume 66.70 mL     LV Systolic Volume Index 38.1 mL/m2    LV Diastolic Volume 101.90 mL    LV Diastolic Volume Index 58.23 mL/m2    LV Mass Index 101 g/m2    Echo EF Estimated 50 %    RA Major Axis 3.70 cm    Triscuspid Valve Regurgitation Peak Gradient 64 mmHg   Basic Metabolic Panel (BMP)    Collection Time: 04/04/23  2:40 AM   Result Value Ref Range    Sodium 140 136 - 145 mmol/L    Potassium 4.4 3.5 - 5.1 mmol/L    Chloride 107 98 - 107 mmol/L    CO2 23 21 - 32 mmol/L    Anion Gap 14 7 - 16 mmol/L    Glucose 88 74 - 106 mg/dL    BUN 64 (H) 7 - 18 mg/dL    Creatinine 7.58 (H) 0.70 - 1.30 mg/dL    BUN/Creatinine Ratio 8 6 - 20    Calcium 8.7 8.5 - 10.1 mg/dL    eGFR 7 (L) >=60 mL/min/1.73m²   Magnesium    Collection Time: 04/04/23  2:40 AM   Result Value Ref Range    Magnesium 3.2 (H) 1.7 - 2.3 mg/dL   CBC with Differential    Collection Time: 04/04/23  2:40 AM   Result Value Ref Range    WBC 4.34 (L) 4.50 - 11.00 K/uL    RBC 2.38 (L) 4.60 - 6.20 M/uL    Hemoglobin 7.5 (L) 13.5 - 18.0 g/dL    Hematocrit 23.1 (L) 40.0 - 54.0 %    MCV 97.1 (H) 80.0 - 96.0 fL    MCH 31.5 (H) 27.0 - 31.0 pg    MCHC 32.5 32.0 - 36.0 g/dL    RDW 12.1 11.5 - 14.5 %    Platelet Count 149 (L) 150 - 400 K/uL    MPV 13.4 (H) 9.4 - 12.4 fL    Neutrophils % 52.2 (L) 53.0 - 65.0 %    Lymphocytes % 32.9 27.0 - 41.0 %    Monocytes % 9.0 (H) 2.0 - 6.0 %    Eosinophils % 5.5 (H) 1.0 - 4.0 %    Basophils % 0.2 0.0 - 1.0 %    Immature Granulocytes % 0.2 0.0 - 0.4 %    nRBC, Auto 0.0 <=0.0 %    Neutrophils, Abs 2.26 1.80 - 7.70 K/uL    Lymphocytes, Absolute 1.43 1.00 - 4.80 K/uL    Monocytes, Absolute 0.39 0.00 - 0.80 K/uL    Eosinophils, Absolute 0.24 0.00 - 0.50 K/uL    Basophils, Absolute 0.01 0.00 - 0.20 K/uL    Immature Granulocytes, Absolute 0.01 0.00 - 0.04 K/uL    nRBC, Absolute 0.00 <=0.00 x10e3/uL    Diff Type Auto         ASSESSMENT:      The patient is doing well after surgery.       PLAN:      Follow up PRN.    Patient will start using his dialysis  access in about 2 and half weeks time of the 1st of May and come back with any issues.

## 2023-05-12 ENCOUNTER — HOSPITAL ENCOUNTER (INPATIENT)
Facility: HOSPITAL | Age: 83
LOS: 8 days | Discharge: NURSING, REHAB OR LONG-TERM CARE | DRG: 061 | End: 2023-05-22
Attending: EMERGENCY MEDICINE | Admitting: INTERNAL MEDICINE
Payer: MEDICARE

## 2023-05-12 DIAGNOSIS — G45.9 TIA (TRANSIENT ISCHEMIC ATTACK): ICD-10-CM

## 2023-05-12 DIAGNOSIS — I69.351 FLACCID HEMIPLEGIA OF RIGHT DOMINANT SIDE AS LATE EFFECT OF CEREBRAL INFARCTION: ICD-10-CM

## 2023-05-12 DIAGNOSIS — I63.032 CEREBRAL INFARCTION DUE TO THROMBOSIS OF LEFT CAROTID ARTERY: ICD-10-CM

## 2023-05-12 DIAGNOSIS — I63.512 STROKE DUE TO OCCLUSION OF LEFT MIDDLE CEREBRAL ARTERY: ICD-10-CM

## 2023-05-12 DIAGNOSIS — R07.9 CHEST PAIN: ICD-10-CM

## 2023-05-12 DIAGNOSIS — R13.10 DYSPHAGIA, UNSPECIFIED TYPE: ICD-10-CM

## 2023-05-12 DIAGNOSIS — R47.1 DYSARTHRIA AND ANARTHRIA: ICD-10-CM

## 2023-05-12 DIAGNOSIS — I10 PRIMARY HYPERTENSION: ICD-10-CM

## 2023-05-12 DIAGNOSIS — R29.90 EPISODE OF TRANSIENT NEUROLOGIC SYMPTOMS: Primary | ICD-10-CM

## 2023-05-12 LAB
ALBUMIN SERPL BCP-MCNC: 2.9 G/DL (ref 3.5–5)
ALBUMIN/GLOB SERPL: 0.7 {RATIO}
ALP SERPL-CCNC: 63 U/L (ref 45–115)
ALT SERPL W P-5'-P-CCNC: 13 U/L (ref 16–61)
ANION GAP SERPL CALCULATED.3IONS-SCNC: 10 MMOL/L (ref 7–16)
AST SERPL W P-5'-P-CCNC: 11 U/L (ref 15–37)
BASOPHILS # BLD AUTO: 0.02 K/UL (ref 0–0.2)
BASOPHILS NFR BLD AUTO: 0.5 % (ref 0–1)
BILIRUB SERPL-MCNC: 0.6 MG/DL (ref ?–1.2)
BUN SERPL-MCNC: 15 MG/DL (ref 7–18)
BUN/CREAT SERPL: 3 (ref 6–20)
CALCIUM SERPL-MCNC: 8.6 MG/DL (ref 8.5–10.1)
CHLORIDE SERPL-SCNC: 95 MMOL/L (ref 98–107)
CHOLEST SERPL-MCNC: 160 MG/DL (ref 0–200)
CHOLEST/HDLC SERPL: 4.8 {RATIO}
CO2 SERPL-SCNC: 32 MMOL/L (ref 21–32)
CREAT SERPL-MCNC: 4.51 MG/DL (ref 0.7–1.3)
DIFFERENTIAL METHOD BLD: ABNORMAL
EGFR (NO RACE VARIABLE) (RUSH/TITUS): 12 ML/MIN/1.73M2
EOSINOPHIL # BLD AUTO: 0.18 K/UL (ref 0–0.5)
EOSINOPHIL NFR BLD AUTO: 4.4 % (ref 1–4)
EOSINOPHIL NFR BLD MANUAL: 2 % (ref 1–4)
ERYTHROCYTE [DISTWIDTH] IN BLOOD BY AUTOMATED COUNT: 12 % (ref 11.5–14.5)
GLOBULIN SER-MCNC: 4 G/DL (ref 2–4)
GLUCOSE SERPL-MCNC: 108 MG/DL (ref 74–106)
GLUCOSE SERPL-MCNC: 117 MG/DL (ref 70–105)
HCT VFR BLD AUTO: 24.6 % (ref 40–54)
HDLC SERPL-MCNC: 33 MG/DL (ref 40–60)
HGB BLD-MCNC: 7.8 G/DL (ref 13.5–18)
IMM GRANULOCYTES # BLD AUTO: 0.01 K/UL (ref 0–0.04)
IMM GRANULOCYTES NFR BLD: 0.2 % (ref 0–0.4)
INR BLD: 1.12
LDLC SERPL CALC-MCNC: 102 MG/DL
LDLC/HDLC SERPL: 3.1 {RATIO}
LYMPHOCYTES # BLD AUTO: 1.34 K/UL (ref 1–4.8)
LYMPHOCYTES NFR BLD AUTO: 32.6 % (ref 27–41)
LYMPHOCYTES NFR BLD MANUAL: 35 % (ref 27–41)
MAGNESIUM SERPL-MCNC: 2 MG/DL (ref 1.7–2.3)
MCH RBC QN AUTO: 31.5 PG (ref 27–31)
MCHC RBC AUTO-ENTMCNC: 31.7 G/DL (ref 32–36)
MCV RBC AUTO: 99.2 FL (ref 80–96)
METAMYELOCYTES NFR BLD MANUAL: 3 %
MONOCYTES # BLD AUTO: 0.51 K/UL (ref 0–0.8)
MONOCYTES NFR BLD AUTO: 12.4 % (ref 2–6)
MONOCYTES NFR BLD MANUAL: 4 % (ref 2–6)
MPC BLD CALC-MCNC: 12.5 FL (ref 9.4–12.4)
NEUTROPHILS # BLD AUTO: 2.05 K/UL (ref 1.8–7.7)
NEUTROPHILS NFR BLD AUTO: 49.9 % (ref 53–65)
NEUTS SEG NFR BLD MANUAL: 56 % (ref 50–62)
NONHDLC SERPL-MCNC: 127 MG/DL
NRBC # BLD AUTO: 0 X10E3/UL
NRBC, AUTO (.00): 0 %
PLATELET # BLD AUTO: 118 K/UL (ref 150–400)
PLATELET MORPHOLOGY: ABNORMAL
POTASSIUM SERPL-SCNC: 3.4 MMOL/L (ref 3.5–5.1)
PROT SERPL-MCNC: 6.9 G/DL (ref 6.4–8.2)
PROTHROMBIN TIME: 14 SECONDS (ref 11.7–14.7)
RBC # BLD AUTO: 2.48 M/UL (ref 4.6–6.2)
RBC MORPH BLD: NORMAL
SODIUM SERPL-SCNC: 134 MMOL/L (ref 136–145)
TRIGL SERPL-MCNC: 124 MG/DL (ref 35–150)
VLDLC SERPL-MCNC: 25 MG/DL
WBC # BLD AUTO: 4.11 K/UL (ref 4.5–11)

## 2023-05-12 PROCEDURE — 80053 COMPREHEN METABOLIC PANEL: CPT | Performed by: EMERGENCY MEDICINE

## 2023-05-12 PROCEDURE — 82746 ASSAY OF FOLIC ACID SERUM: CPT | Performed by: INTERNAL MEDICINE

## 2023-05-12 PROCEDURE — 82962 GLUCOSE BLOOD TEST: CPT

## 2023-05-12 PROCEDURE — 83735 ASSAY OF MAGNESIUM: CPT | Performed by: EMERGENCY MEDICINE

## 2023-05-12 PROCEDURE — 25000003 PHARM REV CODE 250: Performed by: EMERGENCY MEDICINE

## 2023-05-12 PROCEDURE — 99223 1ST HOSP IP/OBS HIGH 75: CPT | Mod: ,,, | Performed by: INTERNAL MEDICINE

## 2023-05-12 PROCEDURE — 93010 EKG 12-LEAD: ICD-10-PCS | Mod: ,,, | Performed by: HOSPITALIST

## 2023-05-12 PROCEDURE — 99285 EMERGENCY DEPT VISIT HI MDM: CPT | Mod: 25

## 2023-05-12 PROCEDURE — 82728 ASSAY OF FERRITIN: CPT | Performed by: INTERNAL MEDICINE

## 2023-05-12 PROCEDURE — 80061 LIPID PANEL: CPT | Performed by: EMERGENCY MEDICINE

## 2023-05-12 PROCEDURE — 85025 COMPLETE CBC W/AUTO DIFF WBC: CPT | Performed by: EMERGENCY MEDICINE

## 2023-05-12 PROCEDURE — G0426 INPT/ED TELECONSULT50: HCPCS | Mod: GT,,, | Performed by: STUDENT IN AN ORGANIZED HEALTH CARE EDUCATION/TRAINING PROGRAM

## 2023-05-12 PROCEDURE — 99223 PR INITIAL HOSPITAL CARE,LEVL III: ICD-10-PCS | Mod: ,,, | Performed by: INTERNAL MEDICINE

## 2023-05-12 PROCEDURE — 83550 IRON BINDING TEST: CPT | Performed by: INTERNAL MEDICINE

## 2023-05-12 PROCEDURE — 99285 PR EMERGENCY DEPT VISIT,LEVEL V: ICD-10-PCS | Mod: ,,, | Performed by: EMERGENCY MEDICINE

## 2023-05-12 PROCEDURE — 83540 ASSAY OF IRON: CPT | Performed by: INTERNAL MEDICINE

## 2023-05-12 PROCEDURE — 85610 PROTHROMBIN TIME: CPT | Performed by: EMERGENCY MEDICINE

## 2023-05-12 PROCEDURE — 99285 EMERGENCY DEPT VISIT HI MDM: CPT | Mod: ,,, | Performed by: EMERGENCY MEDICINE

## 2023-05-12 PROCEDURE — 93005 ELECTROCARDIOGRAM TRACING: CPT

## 2023-05-12 PROCEDURE — 93010 ELECTROCARDIOGRAM REPORT: CPT | Mod: ,,, | Performed by: HOSPITALIST

## 2023-05-12 PROCEDURE — G0426 PR INPT TELEHEALTH CONSULT 50M: ICD-10-PCS | Mod: GT,,, | Performed by: STUDENT IN AN ORGANIZED HEALTH CARE EDUCATION/TRAINING PROGRAM

## 2023-05-12 RX ORDER — ASPIRIN 325 MG
325 TABLET ORAL
Status: COMPLETED | OUTPATIENT
Start: 2023-05-12 | End: 2023-05-12

## 2023-05-12 RX ADMIN — ASPIRIN 325 MG ORAL TABLET 325 MG: 325 PILL ORAL at 07:05

## 2023-05-13 LAB
ALBUMIN SERPL BCP-MCNC: 2.6 G/DL (ref 3.5–5)
ANION GAP SERPL CALCULATED.3IONS-SCNC: 12 MMOL/L (ref 7–16)
AORTIC VALVE CUSP SEPERATION: 1.34 CM
AV INDEX (PROSTH): 0.54
AV MEAN GRADIENT: 10 MMHG
AV PEAK GRADIENT: 21 MMHG
AV REGURGITATION PRESSURE HALF TIME: 428 MS
AV VALVE AREA: 1.53 CM2
BASOPHILS # BLD AUTO: 0.02 K/UL (ref 0–0.2)
BASOPHILS NFR BLD AUTO: 0.7 % (ref 0–1)
BSA FOR ECHO PROCEDURE: 1.65 M2
BUN SERPL-MCNC: 18 MG/DL (ref 7–18)
BUN/CREAT SERPL: 4 (ref 6–20)
CALCIUM SERPL-MCNC: 8.6 MG/DL (ref 8.5–10.1)
CHLORIDE SERPL-SCNC: 96 MMOL/L (ref 98–107)
CO2 SERPL-SCNC: 31 MMOL/L (ref 21–32)
CREAT SERPL-MCNC: 4.71 MG/DL (ref 0.7–1.3)
CV ECHO LV RWT: 0.51 CM
DIFFERENTIAL METHOD BLD: ABNORMAL
DOP CALC AO PEAK VEL: 2.3 M/S
DOP CALC AO VTI: 44.22 CM
DOP CALC LVOT AREA: 2.8 CM2
DOP CALC LVOT DIAMETER: 1.89 CM
DOP CALC LVOT STROKE VOLUME: 67.52 CM3
DOP CALCLVOT PEAK VEL VTI: 24.08 CM
E WAVE DECELERATION TIME: 150 MSEC
E/A RATIO: 1.31
E/E' RATIO: 13.86 M/S
ECHO LV POSTERIOR WALL: 1.23 CM (ref 0.6–1.1)
EGFR (NO RACE VARIABLE) (RUSH/TITUS): 12 ML/MIN/1.73M2
EJECTION FRACTION: 50 %
EOSINOPHIL # BLD AUTO: 0.2 K/UL (ref 0–0.5)
EOSINOPHIL NFR BLD AUTO: 6.9 % (ref 1–4)
ERYTHROCYTE [DISTWIDTH] IN BLOOD BY AUTOMATED COUNT: 11.9 % (ref 11.5–14.5)
FERRITIN SERPL-MCNC: 426 NG/ML (ref 26–388)
FOLATE SERPL-MCNC: 7.6 NG/ML (ref 3.1–17.5)
FRACTIONAL SHORTENING: 36 % (ref 28–44)
GLUCOSE SERPL-MCNC: 76 MG/DL (ref 74–106)
HAV IGM SER QL: NORMAL
HBV CORE IGM SER QL: NORMAL
HBV SURFACE AG SERPL QL IA: NORMAL
HBV SURFACE AG SERPL QL IA: NORMAL
HCT VFR BLD AUTO: 22.8 % (ref 40–54)
HCV AB SER QL: NORMAL
HGB BLD-MCNC: 7.5 G/DL (ref 13.5–18)
IMM GRANULOCYTES # BLD AUTO: 0.01 K/UL (ref 0–0.04)
IMM GRANULOCYTES NFR BLD: 0.3 % (ref 0–0.4)
INTERVENTRICULAR SEPTUM: 1.21 CM (ref 0.6–1.1)
IRON SATN MFR SERPL: 19 % (ref 14–50)
IRON SERPL-MCNC: 33 ΜG/DL (ref 65–175)
IVC DIAMETER: 1.55 CM
LEFT ATRIUM SIZE: 3.98 CM
LEFT INTERNAL DIMENSION IN SYSTOLE: 3.08 CM (ref 2.1–4)
LEFT VENTRICLE DIASTOLIC VOLUME INDEX: 38.5 ML/M2
LEFT VENTRICLE DIASTOLIC VOLUME: 64.3 ML
LEFT VENTRICLE MASS INDEX: 134 G/M2
LEFT VENTRICLE SYSTOLIC VOLUME INDEX: 17.9 ML/M2
LEFT VENTRICLE SYSTOLIC VOLUME: 29.9 ML
LEFT VENTRICULAR INTERNAL DIMENSION IN DIASTOLE: 4.79 CM (ref 3.5–6)
LEFT VENTRICULAR MASS: 223.6 G
LV LATERAL E/E' RATIO: 24.25 M/S
LV SEPTAL E/E' RATIO: 9.7 M/S
LYMPHOCYTES # BLD AUTO: 1.2 K/UL (ref 1–4.8)
LYMPHOCYTES NFR BLD AUTO: 41.7 % (ref 27–41)
MCH RBC QN AUTO: 32.3 PG (ref 27–31)
MCHC RBC AUTO-ENTMCNC: 32.9 G/DL (ref 32–36)
MCV RBC AUTO: 98.3 FL (ref 80–96)
MONOCYTES # BLD AUTO: 0.35 K/UL (ref 0–0.8)
MONOCYTES NFR BLD AUTO: 12.2 % (ref 2–6)
MPC BLD CALC-MCNC: 13.1 FL (ref 9.4–12.4)
MV PEAK A VEL: 0.74 M/S
MV PEAK E VEL: 0.97 M/S
NEUTROPHILS # BLD AUTO: 1.1 K/UL (ref 1.8–7.7)
NEUTROPHILS NFR BLD AUTO: 38.2 % (ref 53–65)
NRBC # BLD AUTO: 0 X10E3/UL
NRBC, AUTO (.00): 0 %
OHS LV EJECTION FRACTION SIMPSONS BIPLANE MOD: 5 %
PHOSPHATE SERPL-MCNC: 4.1 MG/DL (ref 2.5–4.5)
PISA AR MAX VEL: 5.42 M/S
PISA TR MAX VEL: 3.04 M/S
PLATELET # BLD AUTO: 100 K/UL (ref 150–400)
POC OCCULT BLOOD STOOL: NEGATIVE
POTASSIUM SERPL-SCNC: 3.2 MMOL/L (ref 3.5–5.1)
RA WIDTH: 4.93 CM
RBC # BLD AUTO: 2.32 M/UL (ref 4.6–6.2)
RIGHT VENTRICULAR END-DIASTOLIC DIMENSION: 3.54 CM
RIGHT VENTRICULAR LENGTH IN DIASTOLE (APICAL 4-CHAMBER VIEW): 5.97 CM
RV MID DIAMA: 2.56 CM
SARS-COV-2 RDRP RESP QL NAA+PROBE: NEGATIVE
SODIUM SERPL-SCNC: 136 MMOL/L (ref 136–145)
TDI LATERAL: 0.04 M/S
TDI SEPTAL: 0.1 M/S
TDI: 0.07 M/S
TIBC SERPL-MCNC: 171 ΜG/DL (ref 250–450)
TR MAX PG: 37 MMHG
VIT B12 SERPL-MCNC: 913 PG/ML (ref 193–986)
WBC # BLD AUTO: 2.88 K/UL (ref 4.5–11)

## 2023-05-13 PROCEDURE — 90935 HEMODIALYSIS ONE EVALUATION: CPT

## 2023-05-13 PROCEDURE — 87635 SARS-COV-2 COVID-19 AMP PRB: CPT | Performed by: INTERNAL MEDICINE

## 2023-05-13 PROCEDURE — 92950 HEART/LUNG RESUSCITATION CPR: CPT

## 2023-05-13 PROCEDURE — 80074 ACUTE HEPATITIS PANEL: CPT | Performed by: INTERNAL MEDICINE

## 2023-05-13 PROCEDURE — 83036 HEMOGLOBIN GLYCOSYLATED A1C: CPT | Mod: 90 | Performed by: STUDENT IN AN ORGANIZED HEALTH CARE EDUCATION/TRAINING PROGRAM

## 2023-05-13 PROCEDURE — G0378 HOSPITAL OBSERVATION PER HR: HCPCS

## 2023-05-13 PROCEDURE — 80069 RENAL FUNCTION PANEL: CPT | Performed by: INTERNAL MEDICINE

## 2023-05-13 PROCEDURE — 99900035 HC TECH TIME PER 15 MIN (STAT)

## 2023-05-13 PROCEDURE — 82272 OCCULT BLD FECES 1-3 TESTS: CPT

## 2023-05-13 PROCEDURE — 87340 HEPATITIS B SURFACE AG IA: CPT | Performed by: INTERNAL MEDICINE

## 2023-05-13 PROCEDURE — 25000003 PHARM REV CODE 250: Performed by: INTERNAL MEDICINE

## 2023-05-13 PROCEDURE — 96372 THER/PROPH/DIAG INJ SC/IM: CPT | Performed by: INTERNAL MEDICINE

## 2023-05-13 PROCEDURE — 94761 N-INVAS EAR/PLS OXIMETRY MLT: CPT

## 2023-05-13 PROCEDURE — 86707 HEPATITIS BE ANTIBODY: CPT | Mod: 90 | Performed by: INTERNAL MEDICINE

## 2023-05-13 PROCEDURE — 85025 COMPLETE CBC W/AUTO DIFF WBC: CPT | Performed by: INTERNAL MEDICINE

## 2023-05-13 PROCEDURE — 63600175 PHARM REV CODE 636 W HCPCS: Performed by: INTERNAL MEDICINE

## 2023-05-13 PROCEDURE — 25500020 PHARM REV CODE 255: Performed by: FAMILY MEDICINE

## 2023-05-13 PROCEDURE — 27000221 HC OXYGEN, UP TO 24 HOURS

## 2023-05-13 PROCEDURE — 86706 HEP B SURFACE ANTIBODY: CPT | Mod: 90 | Performed by: INTERNAL MEDICINE

## 2023-05-13 RX ORDER — ATORVASTATIN CALCIUM 80 MG/1
80 TABLET, FILM COATED ORAL NIGHTLY
Status: DISCONTINUED | OUTPATIENT
Start: 2023-05-13 | End: 2023-05-22 | Stop reason: HOSPADM

## 2023-05-13 RX ORDER — GLUCAGON 1 MG
1 KIT INJECTION
Status: DISCONTINUED | OUTPATIENT
Start: 2023-05-13 | End: 2023-05-13

## 2023-05-13 RX ORDER — ASPIRIN 81 MG/1
81 TABLET ORAL DAILY
Status: DISCONTINUED | OUTPATIENT
Start: 2023-05-13 | End: 2023-05-14

## 2023-05-13 RX ORDER — HYDROCODONE BITARTRATE AND ACETAMINOPHEN 5; 325 MG/1; MG/1
1 TABLET ORAL EVERY 6 HOURS PRN
Status: DISCONTINUED | OUTPATIENT
Start: 2023-05-13 | End: 2023-05-22 | Stop reason: HOSPADM

## 2023-05-13 RX ORDER — ENOXAPARIN SODIUM 100 MG/ML
40 INJECTION SUBCUTANEOUS EVERY 24 HOURS
Status: DISCONTINUED | OUTPATIENT
Start: 2023-05-13 | End: 2023-05-14 | Stop reason: SDUPTHER

## 2023-05-13 RX ORDER — SODIUM CHLORIDE 9 MG/ML
INJECTION, SOLUTION INTRAVENOUS
Status: DISCONTINUED | OUTPATIENT
Start: 2023-05-13 | End: 2023-05-22 | Stop reason: HOSPADM

## 2023-05-13 RX ORDER — SODIUM CHLORIDE 0.9 % (FLUSH) 0.9 %
10 SYRINGE (ML) INJECTION EVERY 12 HOURS PRN
Status: DISCONTINUED | OUTPATIENT
Start: 2023-05-13 | End: 2023-05-22 | Stop reason: HOSPADM

## 2023-05-13 RX ORDER — CLOPIDOGREL 300 MG/1
300 TABLET, FILM COATED ORAL ONCE
Status: COMPLETED | OUTPATIENT
Start: 2023-05-13 | End: 2023-05-13

## 2023-05-13 RX ORDER — NALOXONE HCL 0.4 MG/ML
0.02 VIAL (ML) INJECTION
Status: DISCONTINUED | OUTPATIENT
Start: 2023-05-13 | End: 2023-05-22 | Stop reason: HOSPADM

## 2023-05-13 RX ORDER — ACETAMINOPHEN 325 MG/1
650 TABLET ORAL EVERY 4 HOURS PRN
Status: DISCONTINUED | OUTPATIENT
Start: 2023-05-13 | End: 2023-05-22 | Stop reason: HOSPADM

## 2023-05-13 RX ORDER — ONDANSETRON 2 MG/ML
4 INJECTION INTRAMUSCULAR; INTRAVENOUS EVERY 8 HOURS PRN
Status: DISCONTINUED | OUTPATIENT
Start: 2023-05-13 | End: 2023-05-22 | Stop reason: HOSPADM

## 2023-05-13 RX ORDER — CLOPIDOGREL BISULFATE 75 MG/1
75 TABLET ORAL DAILY
Status: DISCONTINUED | OUTPATIENT
Start: 2023-05-13 | End: 2023-05-14

## 2023-05-13 RX ADMIN — CLOPIDOGREL BISULFATE 300 MG: 300 TABLET, FILM COATED ORAL at 12:05

## 2023-05-13 RX ADMIN — ATORVASTATIN CALCIUM 80 MG: 80 TABLET, FILM COATED ORAL at 09:05

## 2023-05-13 RX ADMIN — CLOPIDOGREL BISULFATE 75 MG: 75 TABLET ORAL at 10:05

## 2023-05-13 RX ADMIN — ENOXAPARIN SODIUM 40 MG: 100 INJECTION SUBCUTANEOUS at 04:05

## 2023-05-13 RX ADMIN — IOPAMIDOL 100 ML: 755 INJECTION, SOLUTION INTRAVENOUS at 12:05

## 2023-05-13 RX ADMIN — ATORVASTATIN CALCIUM 80 MG: 80 TABLET, FILM COATED ORAL at 12:05

## 2023-05-13 RX ADMIN — ASPIRIN 81 MG: 81 TABLET, COATED ORAL at 10:05

## 2023-05-13 NOTE — H&P
[facility] - Emergency Department  [facility] Medicine  History & Physical    Patient Name: Pardeep Collins  MRN: [id number]  Patient Class: Emergency  Admission Date: 5/12/2023  Attending Physician: MERRILL Syed MD  Primary Care Provider: Primary Doctor No         Patient information was obtained from patient and ER records.     Subjective:     Principal Problem:Episode of transient neurologic symptoms    Chief Complaint:   Chief Complaint   Patient presents with    Weakness     Ems from home - family states had an episode at 11am - then again around 3 while on phone - when ems arrived  complete R side weakness and slurred speech - upon er arrival pt fine - quoted ss# - to ct - hx of dialysis         HPI: Patient is an 82-year-old male with a history of essential hypertension and ESRD on HD on TTS with associated anemia who presents to the emergency room with transient episodes of slurred speech and right-sided weakness.  Patient stated that he was in his usual state of health until the today around 11:00 a.m. when he suddenly experienced slurred speech and right-sided weakness.  He stated that these symptoms lasted about 5 minute with complete resolution and did not think much of it when he experienced same symptoms at around 2:00 p.m. which lasted about 10 minutes again with complete resolution without any intervention.  Patient was subsequently brought in by EMS for evaluation.    On presentation, vital signs were stable and patient was afebrile.  Physical examination revealed NIHSS of 0 points.  Ensuing workup including CT of head was unremarkable.  Tele neurologist was consulted who recommended CTA of the head and neck and provided that there is no evidence of LVO, patient needs be started on DAPT with loading dose of Plavix and high-intensity statin.  Patient will be admitted for further evaluation and intervention.      Past Medical History:   Diagnosis Date    Dialysis patient     Elevated PSA      Gout, unspecified     Hypertension     Renal disorder     Rheumatoid arthritis, unspecified        Past Surgical History:   Procedure Laterality Date    AV FISTULA PLACEMENT Right 3/20/2023    Procedure: CREATION, AV FISTULA;  Surgeon: Alfred Sierra MD;  Location: [facility];  Service: General;  Laterality: Right;  right basilic vein transposition    HAND SURGERY Left     urolift      in [place];       Review of patient's allergies indicates:  No Known Allergies    No current facility-administered medications on file prior to encounter.     Current Outpatient Medications on File Prior to Encounter   Medication Sig    desmopressin (DDAVP) 0.2 MG tablet Take 1 tablet (200 mcg total) by mouth nightly.    furosemide (LASIX) 20 MG tablet Take 1 tablet (20 mg total) by mouth 2 (two) times daily.    HYDROcodone-acetaminophen (NORCO) 7.5-325 mg per tablet Take 1 tablet by mouth every 6 (six) hours as needed for Pain.    NIFEdipine (ADALAT CC) 30 MG TbSR Take 30 mg by mouth once daily.     Family History       Problem Relation (Age of Onset)    Breast cancer Sister    Heart disease Father          Tobacco Use    Smoking status: Former     Types: Cigarettes     Quit date:      Years since quittin.3    Smokeless tobacco: Never   Substance and Sexual Activity    Alcohol use: Not Currently     Comment: quit in     Drug use: Never    Sexual activity: Not on file     Review of Systems   Constitutional:  Negative for chills, diaphoresis, fatigue and fever.   HENT:  Negative for congestion, hearing loss, nosebleeds, postnasal drip, sore throat, tinnitus and trouble swallowing.    Eyes:  Negative for photophobia, pain, discharge, itching and visual disturbance.   Respiratory:  Negative for cough, shortness of breath, wheezing and stridor.    Cardiovascular:  Negative for chest pain, palpitations and leg swelling.   Gastrointestinal:  Negative for abdominal distention, abdominal pain, anal bleeding,  blood in stool, constipation, diarrhea, nausea and vomiting.   Endocrine: Negative for cold intolerance, heat intolerance, polydipsia, polyphagia and polyuria.   Genitourinary:  Negative for decreased urine volume, difficulty urinating, dysuria, flank pain, frequency, hematuria and urgency.   Musculoskeletal:  Negative for arthralgias, back pain, gait problem, joint swelling, myalgias, neck pain and neck stiffness.   Skin:  Negative for color change, pallor and rash.   Allergic/Immunologic: Negative for immunocompromised state.   Neurological:  Positive for speech difficulty and weakness. Negative for dizziness, tremors, seizures, syncope, facial asymmetry, light-headedness, numbness and headaches.   Hematological:  Negative for adenopathy. Does not bruise/bleed easily.   Objective:     Vital Signs (Most Recent):  Temp: 98.7 °F (37.1 °C) (05/12/23 1937)  Pulse: 71 (05/12/23 2352)  Resp: 18 (05/12/23 2137)  BP: (!) 165/81 (05/12/23 2352)  SpO2: 100 % (05/12/23 2352) Vital Signs (24h Range):  Temp:  [98.7 °F (37.1 °C)] 98.7 °F (37.1 °C)  Pulse:  [64-90] 71  Resp:  [15-18] 18  SpO2:  [98 %-100 %] 100 %  BP: (132-188)/(72-94) 165/81     Weight: 60.8 kg (134 lb)  Body mass index is 20.37 kg/m².     Physical Exam  Vitals reviewed.   Constitutional:       General: He is not in acute distress.     Appearance: Normal appearance.   HENT:      Head: Normocephalic and atraumatic.      Right Ear: External ear normal.      Left Ear: External ear normal.   Eyes:      Extraocular Movements: Extraocular movements intact.      Pupils: Pupils are equal, round, and reactive to light.   Cardiovascular:      Rate and Rhythm: Normal rate and regular rhythm.      Pulses: Normal pulses.      Heart sounds: Normal heart sounds. No murmur heard.  Pulmonary:      Effort: Pulmonary effort is normal. No respiratory distress.      Breath sounds: Normal breath sounds. No wheezing.   Chest:      Chest wall: No tenderness.   Abdominal:      General:  Abdomen is flat.      Palpations: Abdomen is soft. There is no mass.      Tenderness: There is no abdominal tenderness. There is no right CVA tenderness or left CVA tenderness.   Musculoskeletal:         General: No swelling or tenderness. Normal range of motion.   Skin:     General: Skin is warm and dry.      Capillary Refill: Capillary refill takes less than 2 seconds.   Neurological:      General: No focal deficit present.      Mental Status: He is alert and oriented to person, place, and time. Mental status is at baseline.   Psychiatric:         Mood and Affect: Mood normal.         Thought Content: Thought content normal.        Cranial nerves 2-12 were grossly intact.     Significant Labs: All pertinent labs within the past 24 hours have been reviewed.    Significant Imaging: I have reviewed all pertinent imaging results/findings within the past 24 hours.    Assessment/Plan:     * Episode of transient neurologic symptoms    Patient 2 episodes of transient neurological deficits including symptoms of dysarthria and right-sided weakness.  When patient arrived in ED patient had NIHSS of 0.  Initial workup including CT of the head was unremarkable.  Tele neurologist recommended CTA of head and neck and provided that there is no evidence of LVO, patient will be started on DAPT with loading dose of Plavix and a high-intensity statin.  Will also proceed with MRI of brain and echocardiogram      Primary hypertension    Will hold off on antihypertensive to allow for permissive hypertension for the next 24 hours      ESRD (end stage renal disease)    Patient is under the care of Dr. Aceves and dialyzes on Tuesdays Thursdays and Saturday.  Will consult Dr. Aceves      Anemia    Likely multifactorial.  Patient's hemoglobin stable from his baseline        VTE Risk Mitigation (From admission, onward)    Lovenox 40 mg subQ Q 24 hours                     Haroon Henry MD  Department of Hospital Medicine  [facility] -  Emergency Department

## 2023-05-13 NOTE — CONSULTS
[facility]  Vascular Neurology  Comprehensive Stroke Center  TeleVascular Neurology Acute Consultation Note      Consult to Telemedicine - Acute Stroke  Consult performed by: Elaine Sierra MD  Consult ordered by: Spencer Martinez MD        Consulting Provider: SPENCER MARTINEZ.  Current Providers  No providers found    Patient Location:  [facility] EMERGENCY DEPART* Emergency Department  Spoke hospital nurse at bedside with patient assisting consultant.     Patient information was obtained from patient.         Assessment/Plan:       Diagnoses:   Neuro  Episode of transient neurologic symptoms  82M presenting with recurrent episodes of right-sided weakness, confusion, and slurred speech. Sxs started at 11AM. Now resolved.     NCCTH reviewed with no acute intracranial hemorrhage or evidence of large territory infarction. NIHSS 0. Overall concern for TIA vs small ischemic stroke.     Imaging:   - Recommend an expedited CTA head/neck to evaluate for potential symptomatic stenosis/occlusive lesion that might significantly increase risk of recurrent or worsening signs/symptoms.   - Recommend follow-up non-urgent MRI brain without contrast to evaluate for acute ischemia.  - If above studies are abnormal, please load images to teleneurology imaging system and contact us to review.    Antithrombotics for secondary stroke prevention: Load clopidogrel 300mg x 1 now. Then start dual-antiplatelet therapy with ASA 81mg and clopidogrel 75mg daily for 21 days. Then continue ASA 81mg indefinitely.     Statins for secondary stroke prevention and hyperlipidemia, if present: Recommend initiation or continuation of a high-intensity statin for goal LDL < 70mg/dL.    Aggressive risk factor modification: HTN     Rehab efforts: The patient has been evaluated by a stroke team provider and the therapy needs have been fully considered based off the presenting complaints and exam findings. The following therapy  evaluations are needed: None    Diagnostics recommended:   - CTA head/neck with contrast  - MRI brain without contrast   - TTE without bubble study, monitor on telemetry while admitted  - Labs: hemoglobin A1c (goal <7%), lipid panel (LDL goal <70mg/dL), TSH  - Treat hyperglycemia to achieve a blood glucose level in a range of 140-180 mg/dL and to closely monitor to prevent hypoglycemia (Class IIa, Level C-LD).    VTE prophylaxis: Enoxaparin 40 mg SQ every 24 hours  Mechanical prophylaxis: Place SCDs    BP parameters: TIA: SBP <220 until imaging confirmation of no infarct       Please contact us with any further questions or concerns or if patient has any acute neurological changes (new symptoms, worsening deficits).          STROKE DOCUMENTATION     Acute Stroke Times:   Acute Stroke Times   Last Known Normal Date: 05/12/23  Last Known Normal Time: 1100  Symptom Onset Date: 05/12/23  Symptom Onset Time: 1100  Stroke Team Called Date: 05/12/23  Stroke Team Called Time: 2015  Stroke Team Arrival Date: 05/12/23  Stroke Team Arrival Time: 2019  CT Interpretation Time: 2020  Thrombolytic Therapy Recommended: No    NIH Scale:  Interval: baseline  1a. Level of Consciousness: 0-->Alert, keenly responsive  1b. LOC Questions: 0-->Answers both questions correctly  1c. LOC Commands: 0-->Performs both tasks correctly  2. Best Gaze: 0-->Normal  3. Visual: 0-->No visual loss  4. Facial Palsy: 0-->Normal symmetrical movements  5a. Motor Arm, Left: 0-->No drift, limb holds 90 (or 45) degrees for full 10 secs  5b. Motor Arm, Right: 0-->No drift, limb holds 90 (or 45) degrees for full 10 secs  6a. Motor Leg, Left: 0-->No drift, leg holds 30 degree position for full 5 secs  6b. Motor Leg, Right: 0-->No drift, leg holds 30 degree position for full 5 secs  7. Limb Ataxia: 0-->Absent  8. Sensory: 0-->Normal, no sensory loss  9. Best Language: 0-->No aphasia, normal  10. Dysarthria: 0-->Normal  11. Extinction and Inattention (formerly  Neglect): 0-->No abnormality  Total (NIH Stroke Scale): 0     Modified Otego Score: 0  Julienne Coma Scale:    ABCD2 Score:    [id number] Score:   HAS -BLED Score:   ICH Score:   Hunt & Sears Classification:       Blood pressure 132/72, pulse 66, temperature 98.7 °F (37.1 °C), resp. rate 18, weight 60.8 kg (134 lb), SpO2 100 %.  Eligible for thrombolytic therapy?: No  Thrombolytic therapy recomended: Thrombolytic therapy not recommended due to Patient back to neurological baseline  Possible Interventional Revascularization Candidate? Awaiting CTA results from Spoke for determination     Disposition Recommendation: pending further studies    Subjective:     History of Present Illness:  82M with a PMHx significant for HTN and ESRD on HD presenting with slurred speech and right-sided weakness. Symptoms resolved. And then returned - he was not making sense. EMS witnessed right-sided weakness and slurred speech. LKN 11AM. Now resolved. No history of stroke or TIA. No AP/AC medications.         Woke up with symptoms?: no    Recent bleeding noted: no     Does the patient take any Blood Thinners? no     Medications: No relevant medications    Past Medical History: hypertension    Past Surgical History: no relevant surgical history    Family History: no relevant history    Social History: no smoking, no drinking, no drugs    Allergies: No Known Allergies     Review of Systems   The following systems were reviewed with pertinent positives and negatives documented in the HPI: Constitutional, Eyes, CV, Respiratory, GI, , Musculoskeletal, Skin, Neurological, Psychiatric      Objective:   Vitals: Blood pressure 132/72, pulse 66, temperature 98.7 °F (37.1 °C), resp. rate 18, weight 60.8 kg (134 lb), SpO2 100 %.     CT READ: Yes  No hemmorhage. No mass effect. No early infarct signs.     Physical Exam  Vitals reviewed.   Constitutional:       Appearance: Normal appearance.   HENT:      Head: Normocephalic and atraumatic.   Eyes:       General: Lids are normal.      Extraocular Movements: Extraocular movements intact.   Cardiovascular:      Rate and Rhythm: Normal rate and regular rhythm.   Pulmonary:      Effort: Pulmonary effort is normal. No respiratory distress.   Abdominal:      General: Abdomen is flat. There is no distension.   Musculoskeletal:         General: No deformity or signs of injury. Normal range of motion.      Cervical back: Normal range of motion. No rigidity.   Neurological:      General: No focal deficit present.      Mental Status: He is alert and oriented to person, place, and time.      Sensory: No sensory deficit.      Motor: No weakness.   Psychiatric:         Mood and Affect: Mood normal.         Behavior: Behavior normal.             Recommended the emergency room physician to have a brief discussion with the patient and/or family if available regarding the  risks and benefits of treatment, and to briefly document the occurrence of that discussion in his clinical encounter note.     The attending portion of this evaluation, treatment, and documentation was performed per Elaine Sierra MD via audiovisual.    Billing code:  (non-intervention mild to moderate stroke, TIA, some mimics)      This patient has a critical neurological condition/illness, with some potential for high morbidity and mortality.  There is a moderate probability for acute neurological change leading to clinical and possibly life-threatening deterioration requiring highest level of physician preparedness for urgent intervention.  Care was coordinated with other physicians involved in the patient's care.  Radiologic studies and laboratory data were reviewed and interpreted, and plan of care was re-assessed based on the results.  Diagnosis, treatment options and prognosis may have been discussed with the patient and/or family members or caregiver.    In your opinion, this was a: Tier 2 Van Negative    Consult End Time: 8:27 PM     Elaine Sierra MD,  PhD  Comprehensive Stroke Center  Vascular Neurology   [facility]

## 2023-05-13 NOTE — SUBJECTIVE & OBJECTIVE
Past Medical History:   Diagnosis Date    Dialysis patient     Elevated PSA     Gout, unspecified     Hypertension     Renal disorder     Rheumatoid arthritis, unspecified        Past Surgical History:   Procedure Laterality Date    AV FISTULA PLACEMENT Right 3/20/2023    Procedure: CREATION, AV FISTULA;  Surgeon: Alfred Sierra MD;  Location: [facility], [place];  Service: General;  Laterality: Right;  right basilic vein transposition    HAND SURGERY Left     urolift      in [place];       Family History   Problem Relation Age of Onset    Heart disease Father     Breast cancer Sister        Social History     Socioeconomic History    Marital status: Single   Tobacco Use    Smoking status: Former     Types: Cigarettes     Quit date:      Years since quittin.3    Smokeless tobacco: Never   Substance and Sexual Activity    Alcohol use: Not Currently     Comment: quit in     Drug use: Never    Sexual activity: Not Currently     Social Determinants of Health     Financial Resource Strain: Low Risk     Difficulty of Paying Living Expenses: Not hard at all   Food Insecurity: No Food Insecurity    Worried About Running Out of Food in the Last Year: Never true    Ran Out of Food in the Last Year: Never true   Transportation Needs: No Transportation Needs    Lack of Transportation (Medical): No    Lack of Transportation (Non-Medical): No   Physical Activity: Inactive    Days of Exercise per Week: 2 days    Minutes of Exercise per Session: 0 min   Stress: No Stress Concern Present    Feeling of Stress : Not at all   Social Connections: Socially Isolated    Frequency of Communication with Friends and Family: Once a week    Frequency of Social Gatherings with Friends and Family: Once a week    Attends [employer] Services: More than 4 times per year    Active Member of Clubs or Organizations: No    Attends Club or Organization Meetings: Never    Marital Status: Never    Housing Stability: Low Risk     Unable to Pay  for Housing in the Last Year: No    Number of Places Lived in the Last Year: 1    Unstable Housing in the Last Year: No       Current Facility-Administered Medications   Medication Dose Route Frequency Provider Last Rate Last Admin    0.9%  NaCl infusion   Intravenous PRN Chandler Aceves Jr., MD        acetaminophen tablet 650 mg  650 mg Oral Q4H PRN Haroon Henry MD        atorvastatin tablet 80 mg  80 mg Oral QHS Min OLESYA Henry MD   80 mg at 05/14/23 2132    docusate sodium capsule 100 mg  100 mg Oral BID PRN AUSTEN Gutierrez        HYDROcodone-acetaminophen 5-325 mg per tablet 1 tablet  1 tablet Oral Q6H PRN Haroon Henry MD        labetaloL injection 10 mg  10 mg Intravenous Q4H PRN Haroon Henry MD        mupirocin 2 % ointment   Nasal BID Dk Syed Jr., MD   Given at 05/15/23 0834    naloxone 0.4 mg/mL injection 0.02 mg  0.02 mg Intravenous PRN Haroon Henry MD        ondansetron injection 4 mg  4 mg Intravenous Q8H PRN Haroon Henry MD        polyethylene glycol packet 17 g  17 g Oral Daily MAX Gutierrez-AGATRACEYP   17 g at 05/15/23 0834    sodium chloride 0.9% flush 10 mL  10 mL Intravenous Q12H PRN Haroon Henry MD         Home and current medications reviewed    Review of patient's allergies indicates:  No Known Allergies      Review of Systems   Unable to perform ROS: Patient nonverbal    Profound dysarthria      Objective:     Vitals:    05/15/23 1000 05/15/23 1045 05/15/23 1100 05/15/23 1115   BP:    (!) 154/50   Pulse: 68 82 84 72   Resp: 17 14 11 19   Temp:    97.9 °F (36.6 °C)   TempSrc:    Oral   SpO2: 99% 98% 99% 98%   Weight:       Height:            Physical Exam  Vitals reviewed.   Constitutional:       Appearance: Normal appearance.   HENT:      Head: Normocephalic and atraumatic.      Right Ear: External ear normal.      Left Ear: External ear normal.      Nose: Nose normal.   Eyes:      General: Lids are normal.   Pulmonary:      Effort: Pulmonary effort is normal. No respiratory distress.   Abdominal:       General: There is no distension.      Tenderness: There is no guarding.   Musculoskeletal:      Cervical back: No rigidity.      Right lower leg: No edema.      Left lower leg: No edema.   Neurological:      Mental Status: He is alert.      Sensory: No sensory deficit.      Motor: No weakness.   Psychiatric:         Mood and Affect: Mood normal.         Behavior: Behavior normal.     Neurological Exam  LOC: alert  Attention Span: Good   Language: Possible some expressive aphasia but difficult to fully assess due to severe dysarthria  Articulation: Profound dysarthria  Orientation: Person, Place, Time   Visual Fields: Full  EOM (CN III, IV, VI): Full/intact  Facial Sensation (CN V): Normal  Facial Movement (CN VII): Lower facial weakness on the Right  Motor: Arm left    Full antigravity; Full power noted with nurse assistance  Leg left    Full antigravity; Full power noted with nurse assistance  Arm right  No movement noted  Leg right No movement noted  Cerebellum: No ataxia on left; unable to assess due to plegia on right  Sensation: Intact to light touch    Diagnostic Results     Brain Imaging   5/12/2023 CT head reviewed and independently interpreted by me.  No early infarct signs or hemorrhage.  No mass effect.    5/14/2023 CT head reviewed and independently interpreted by me.  No early infarct signs.  No hemorrhage.  No change from CT 5/12/2023.    5/15/2023 MRI Brain reviewed and independently interpreted by me.  Diffusion restriction left posterior limb of the internal capsule. Remote lacunar infarcts bilateral cerebellum.  Significant white matter changes.  No hemorrhage.    Vessel Imaging   5/13/2023 CTA neck reviewed and independently interpreted by me.  No high-grade stenosis or occlusion    5/13/2023 CTA brain reviewed and independently interpreted by me. 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  No other high-grade stenosis or occlusion noted.    5/14/2023 CTA neck reviewed and  independently interpreted by me.  No new occlusion.  Imaging similar to previous study done 5/13.    5/14/2023 CTA brain reviewed and independently interpreted by me.  No new occlusion.  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    Cardiac Imaging   5/13/2023 TTE  EF 50%; no wall motion abnormality; no thrombus/vegetation/shunt; Normal left atrium.

## 2023-05-13 NOTE — PROGRESS NOTES
[facility]  Adult Nutrition  First Assessment Note         Reason for Assessment  Reason For Assessment: identified at risk by screening criteria (MST score of 4)   Nutrition Risk Screen: unintentional loss of 10 lbs or more in the past 2 months, no indicators present    Assessment and Plan  Pt is an 82 yoM with a history of essential hypertension and ESRD on HD on TTS with associated anemia who presents to the emergency room with transient episodes of slurred speech and right-sided weakness. Admitted for further evaluation and intervention.     RD assessment of pt due to MST score of 4 with a problem noted of unintentional weight loss of 10 lbs or more. Weight history reviewed:   1/30: 145 lbs  3/16: 145.6 lbs  4/2: 139 lbs  5/13: 125 lbs    Weight loss of 20 lbs/13% body weight x 2 months. Weight loss of 14 lbs/10% body weight x 1 month. Significant and severe weight loss. Currently with no documentation of how much pt is eating of meals. Receiving a cardiac diet at this time. Appropriate. Renal diet not indicated at this time based on labs- K low, phos WNL/low end of normal, pt receives HD so no need to restrict PRO.     Recommend continue cardiac diet and monitor oral intakes. Given recent significant weight loss, low K and lower phos, will discuss with pt his oral intakes/possible appetite decline or any food insecurity. Will monitor K/Phos/Mg for any drops with improved oral intakes to assess for possible signs of refeeding to determine if pt with severely poor intakes prior to admission. Plan for full NFPE when next available. Pt likely to meet criteria for protein calorie malnutrition. Add Nepro shakes TID to supplement kcal/PRO. Recommend ensure pt swallow function remains appropriate given transient neurologic symptoms on admission. Encourage oral intakes as tolerated.    Last BM 5/10. Monitor labs, meds, weights, po intakes/diet changes, updates in pt condition. RD  following.     Malnutrition  Is Patient Malnourished: No but pt at high risk and likely to meet malnutrition criteria once pt seen for full NFPE  Skin Integrity  Randy Risk Assessment  Sensory Perception: 3-->slightly limited  Moisture: 4-->rarely moist  Activity: 3-->walks occasionally  Mobility: 3-->slightly limited  Nutrition: 1-->very poor  Friction and Shear: 2-->potential problem  Randy Score: 16    Nutrition Diagnosis  Unintentional weight loss   related to Inadequate Caloric intake as evidenced by weight loss of 20 lbs x 2 months, 14 lbs x 1 month    Nutrition Diagnosis Status: New    Nutrition Risk  Level of Risk/Frequency of Follow-up: high    Recent Labs   Lab 05/12/23 1953 05/12/23 2000 05/13/23  0527   GLU  --    < > 76   POCGLU 117*  --   --     < > = values in this interval not displayed.     Comments on Glucose: no hx DM  Nutrition Prescription / Recommendations  Recommendation/Intervention: Recommend continue cardiac diet and monitor oral intakes. Given recent significant weight loss, low K and lower phos, will discuss with pt his oral intakes/possible appetite decline or any food insecurity. Will monitor K/Phos/Mg for any drops with improved oral intakes to assess for possible signs of refeeding to determine if pt with severely poor intakes prior to admission. Plan for full NFPE when next available. Pt likely to meet criteria for protein calorie malnutrition. Add Nepro shakes TID to supplement kcal/PRO. Recommend ensure pt swallow function remains appropriate given transient neurologic symptoms on admission. Encourage oral intakes as tolerated.  Goals: po intakes to meet estimated needs, continued monitoring of K/Phos/Mg to assess if drops occurring with improved intakes; weight stability  Nutrition Goal Status: new  Current Diet Order: Cardiac diet  Chewing or Swallowing Difficulty?: No Chewing or swallowing difficulty  Recommended Diet: Low Sodium ( 2g Sodium)/cardiac  Recommended Oral  Supplement: Nepro [420 kcals, 19g Protein, 38g Carbs(6g Fiber, 8g Sugar), 23g Fat] three times daily  Is Nutrition Support Recommended: No  Is Education Recommended: No  Monitor and Evaluation  % current Intake: Unknown/ No P.O. intake documented  % intake to meet estimated needs: P.O. + Supplements  Food and Nutrient Intake: energy intake  Food and Nutrient Adminstration: diet order  Anthropometric Measurements: weight, weight change  Biochemical Data, Medical Tests and Procedures: electrolyte and renal panel, gastrointestinal profile, glucose/endocrine profile, inflammatory profile, lipid profile  Nutrition-Focused Physical Findings: overall appearance, extremities, muscles and bones, head and eyes, skin     Current Medical Diagnosis and Past Medical History  Diagnosis: other (see comments) (episode of transient neurologic symptoms)  Past Medical History:   Diagnosis Date    Dialysis patient     Elevated PSA     Gout, unspecified     Hypertension     Renal disorder     Rheumatoid arthritis, unspecified      Nutrition/Diet History     Lab/Procedures/Meds  Recent Labs   Lab 05/12/23 2000 05/13/23  0527   * 136   K 3.4* 3.2*   BUN 15 18   CREATININE 4.51* 4.71*   CALCIUM 8.6 8.6   ALBUMIN 2.9* 2.6*   CL 95* 96*   ALT 13*  --    AST 11*  --    PHOS  --  4.1     Last A1c: No results found for: HGBA1C  Lab Results   Component Value Date    RBC 2.32 (L) 05/13/2023    HGB 7.5 (L) 05/13/2023    HCT 22.8 (L) 05/13/2023    MCV 98.3 (H) 05/13/2023    MCH 32.3 (H) 05/13/2023    MCHC 32.9 05/13/2023    TIBC 171 (L) 05/12/2023     Pertinent Labs Reviewed: reviewed  Pertinent Labs Comments: K 3.2(L)-unsure etiology, recommend replete to WNL as appropraite; Cl 96(L)-possibly r/t volume status; Creat 4.71(H), BUN/Creat 4(L), GFR 12(L)-r/t ESRD on HD; Alb 2.6(L)-poss r/t ESRD, inflammatory response/stress response/inadequate protein intakes to meet increased needs via ESRD. Will monitor trends.   Pertinent Medications  "Reviewed: reviewed  Pertinent Medications Comments: aspirin, atorvastatin, clopidogrel, enoxaparin  Anthropometrics  Temp: 97.8 °F (36.6 °C)  Height Method: Stated  Height: 5' 8" (172.7 cm)  Height (inches): 68 in  Weight Method: Bed Scale  Weight: 56.7 kg (125 lb)  Weight (lb): 125 lb  Ideal Body Weight (IBW), Male: 154 lb  % Ideal Body Weight, Male (lb): 81.17 %  BMI (Calculated): 19     Estimated/Assessed Needs  Weight Used For Calorie Calculations: 56.7 kg (125 lb)   Energy Need Method: Kcal/kg Energy Calorie Requirements (kcal): 8817-9201 kcal (30-35 kcal/kg)  Weight Used For Protein Calculations: 56.7 kg (125 lb)  Protein Requirements: 68-79 g PRO (1.2-1.4)       RDA Method (mL): 1701     Nutrition by Nursing              Last Bowel Movement: 05/10/23              Nutrition Follow-Up  RD Follow-up?: Yes    "

## 2023-05-13 NOTE — HPI
Patient is an 82-year-old male with a history of essential hypertension and ESRD on HD on TTS with associated anemia who presents to the emergency room with transient episodes of slurred speech and right-sided weakness.  Patient stated that he was in his usual state of health until the today around 11:00 a.m. when he suddenly experienced slurred speech and right-sided weakness.  He stated that these symptoms lasted about 5 minute with complete resolution and did not think much of it when he experienced same symptoms at around 2:00 p.m. which lasted about 10 minutes again with complete resolution without any intervention.  Patient was subsequently brought in by EMS for evaluation.    On presentation, vital signs were stable and patient was afebrile.  Physical examination revealed NIHSS of 0 points.  Ensuing workup including CT of head was unremarkable.  Tele neurologist was consulted who recommended CTA of the head and neck and provided that there is no evidence of LVO, patient needs be started on DAPT with loading dose of Plavix and high-intensity statin.  Patient will be admitted for further evaluation and intervention.

## 2023-05-13 NOTE — SUBJECTIVE & OBJECTIVE
Past Medical History:   Diagnosis Date    Dialysis patient     Elevated PSA     Gout, unspecified     Hypertension     Renal disorder     Rheumatoid arthritis, unspecified        Past Surgical History:   Procedure Laterality Date    AV FISTULA PLACEMENT Right 3/20/2023    Procedure: CREATION, AV FISTULA;  Surgeon: Alfred Sierra MD;  Location: [facility];  Service: General;  Laterality: Right;  right basilic vein transposition    HAND SURGERY Left     urolift      in [place];       Review of patient's allergies indicates:  No Known Allergies    No current facility-administered medications on file prior to encounter.     Current Outpatient Medications on File Prior to Encounter   Medication Sig    desmopressin (DDAVP) 0.2 MG tablet Take 1 tablet (200 mcg total) by mouth nightly.    furosemide (LASIX) 20 MG tablet Take 1 tablet (20 mg total) by mouth 2 (two) times daily.    HYDROcodone-acetaminophen (NORCO) 7.5-325 mg per tablet Take 1 tablet by mouth every 6 (six) hours as needed for Pain.    NIFEdipine (ADALAT CC) 30 MG TbSR Take 30 mg by mouth once daily.     Family History       Problem Relation (Age of Onset)    Breast cancer Sister    Heart disease Father          Tobacco Use    Smoking status: Former     Types: Cigarettes     Quit date:      Years since quittin.3    Smokeless tobacco: Never   Substance and Sexual Activity    Alcohol use: Not Currently     Comment: quit in     Drug use: Never    Sexual activity: Not on file     Review of Systems   Constitutional:  Negative for chills, diaphoresis, fatigue and fever.   HENT:  Negative for congestion, hearing loss, nosebleeds, postnasal drip, sore throat, tinnitus and trouble swallowing.    Eyes:  Negative for photophobia, pain, discharge, itching and visual disturbance.   Respiratory:  Negative for cough, shortness of breath, wheezing and stridor.    Cardiovascular:  Negative for chest pain, palpitations and leg swelling.   Gastrointestinal:   Negative for abdominal distention, abdominal pain, anal bleeding, blood in stool, constipation, diarrhea, nausea and vomiting.   Endocrine: Negative for cold intolerance, heat intolerance, polydipsia, polyphagia and polyuria.   Genitourinary:  Negative for decreased urine volume, difficulty urinating, dysuria, flank pain, frequency, hematuria and urgency.   Musculoskeletal:  Negative for arthralgias, back pain, gait problem, joint swelling, myalgias, neck pain and neck stiffness.   Skin:  Negative for color change, pallor and rash.   Allergic/Immunologic: Negative for immunocompromised state.   Neurological:  Positive for speech difficulty and weakness. Negative for dizziness, tremors, seizures, syncope, facial asymmetry, light-headedness, numbness and headaches.   Hematological:  Negative for adenopathy. Does not bruise/bleed easily.   Objective:     Vital Signs (Most Recent):  Temp: 98.7 °F (37.1 °C) (05/12/23 1937)  Pulse: 71 (05/12/23 2352)  Resp: 18 (05/12/23 2137)  BP: (!) 165/81 (05/12/23 2352)  SpO2: 100 % (05/12/23 2352) Vital Signs (24h Range):  Temp:  [98.7 °F (37.1 °C)] 98.7 °F (37.1 °C)  Pulse:  [64-90] 71  Resp:  [15-18] 18  SpO2:  [98 %-100 %] 100 %  BP: (132-188)/(72-94) 165/81     Weight: 60.8 kg (134 lb)  Body mass index is 20.37 kg/m².     Physical Exam  Vitals reviewed.   Constitutional:       General: He is not in acute distress.     Appearance: Normal appearance.   HENT:      Head: Normocephalic and atraumatic.      Right Ear: External ear normal.      Left Ear: External ear normal.   Eyes:      Extraocular Movements: Extraocular movements intact.      Pupils: Pupils are equal, round, and reactive to light.   Cardiovascular:      Rate and Rhythm: Normal rate and regular rhythm.      Pulses: Normal pulses.      Heart sounds: Normal heart sounds. No murmur heard.  Pulmonary:      Effort: Pulmonary effort is normal. No respiratory distress.      Breath sounds: Normal breath sounds. No wheezing.    Chest:      Chest wall: No tenderness.   Abdominal:      General: Abdomen is flat.      Palpations: Abdomen is soft. There is no mass.      Tenderness: There is no abdominal tenderness. There is no right CVA tenderness or left CVA tenderness.   Musculoskeletal:         General: No swelling or tenderness. Normal range of motion.   Skin:     General: Skin is warm and dry.      Capillary Refill: Capillary refill takes less than 2 seconds.   Neurological:      General: No focal deficit present.      Mental Status: He is alert and oriented to person, place, and time. Mental status is at baseline.   Psychiatric:         Mood and Affect: Mood normal.         Thought Content: Thought content normal.        Cranial nerves 2-12 were grossly intact.     Significant Labs: All pertinent labs within the past 24 hours have been reviewed.    Significant Imaging: I have reviewed all pertinent imaging results/findings within the past 24 hours.

## 2023-05-13 NOTE — NURSING
Pt arrived from the ER via stretcher, A&Ox3, with slurred speech, Resp even and non-labored, +BS, +Pedal pulses. NAD. Will report to RN.

## 2023-05-13 NOTE — PLAN OF CARE
[facility]  Initial Discharge Assessment       Primary Care Provider: Primary Doctor No    Admission Diagnosis: TIA (transient ischemic attack) [G45.9]  Episode of transient neurologic symptoms [R29.90]  Chest pain [R07.9]    Admission Date: 5/12/2023  Expected Discharge Date:     Transition of Care Barriers: (P) None    Payor: [facility] MCARE / Plan: [facility] MEDICARE COMPLETE / Product Type: Medicare Advantage /     Extended Emergency Contact Information  Primary Emergency Contact: leila rubi  Mobile Phone: [phone]  Relation: Sister  Preferred language: English   needed? No  Secondary Emergency Contact: Jose Cedeño  Mobile Phone: [phone]  Relation: Relative  Preferred language: English   needed? No    Discharge Plan A: (P) Home, Home Health  Discharge Plan B: (P) Home, Home Health      [facility] ([facility] ) - [place], [place] - [address]  [address]  [address]  [facility] [address]  Phone: [phone] Fax: [phone]    [facility] [address] [place] [address] - [address]  [address] [facility] [address]  Phone: [phone] Fax: [phone]    [facility] [place] [place] - [address] [place] [address]  [address] [place] [address]  Phone: [phone] Fax: [phone]      Initial Assessment (most recent)       Adult Discharge Assessment - 05/13/23 1154          Discharge Assessment    Assessment Type Discharge Planning Assessment     Confirmed/corrected address, phone number and insurance Yes     Confirmed Demographics Correct on Facesheet     Source of Information patient     Does patient/caregiver understand observation status Yes     Communicated RASHAD with patient/caregiver Date not available/Unable to determine     People in Home alone     Do you expect to return to your current living situation? Yes (P)      Do you have help at home or someone to help you manage your  care at home? No (P)      Prior to hospitilization cognitive status: Unable to Assess (P)      Current cognitive status: Alert/Oriented (P)      Walking or Climbing Stairs ambulation difficulty, requires equipment (P)      Mobility Management Cane (P)      Dressing/Bathing -- (P)    No difficulty reported by pt    Do you have any problems with: -- (P)    Pt reports his family assists with his needs    Equipment Currently Used at Home cane, straight (P)      Readmission within 30 days? No (P)      Patient currently being followed by outpatient case management? No (P)      Do you currently have service(s) that help you manage your care at home? Yes (P)    Per pt, he has a nurse that comes once a week and he also has someone who assists with cleaning twice a week.  He is uncertain which agency provides home health services.    How Many hours does patient receive services -- (P)    Unknown    Name and Contact number of agency -- (P)    Pt  does not know the name o [facility] home health provider but states a nurse comes one time per week.    Is the pt/caregiver preference to resume services with current agency Yes (P)      Do you take prescription medications? Yes (P)      Do you have prescription coverage? Yes (P)      Coverage [employer] Managed Medicare (P)      Do you have any problems affording any of your prescribed medications? No (P)      Is the patient taking medications as prescribed? yes (P)      Who is going to help you get home at discharge? Family (P)      How do you get to doctors appointments? family or friend will provide (P)      Are you on dialysis? Yes (P)      Dialysis Name and Scheduled days [facility] [place]) - Tuesday, Thursday, Saturday (P)      Do you take coumadin? No (P)      Discharge Plan A Home;Home Health (P)      Discharge Plan B Home;Home Health (P)      DME Needed Upon Discharge  none (P)      Discharge Plan discussed with: Patient (P)      Transition of Care Barriers None (P)          Physical Activity    On average, how many days per week do you engage in moderate to strenuous exercise (like a brisk walk)? 2 days (P)      On average, how many minutes do you engage in exercise at this level? -- (P)    5 minutes       Financial Resource Strain    How hard is it for you to pay for the very basics like food, housing, medical care, and heating? Not hard at all (P)         Housing Stability    In the last 12 months, was there a time when you were not able to pay the mortgage or rent on time? No (P)      In the last 12 months, how many places have you lived? 1 (P)      In the last 12 months, was there a time when you did not have a steady place to sleep or slept in a shelter (including now)? No (P)         Transportation Needs    In the past 12 months, has lack of transportation kept you from medical appointments or from getting medications? No (P)      In the past 12 months, has lack of transportation kept you from meetings, work, or from getting things needed for daily living? No (P)         Food Insecurity    Within the past 12 months, you worried that your food would run out before you got the money to buy more. Never true (P)      Within the past 12 months, the food you bought just didn't last and you didn't have money to get more. Never true (P)         Stress    Do you feel stress - tense, restless, nervous, or anxious, or unable to sleep at night because your mind is troubled all the time - these days? Not at all (P)         Social Connections    In a typical week, how many times do you talk on the phone with family, friends, or neighbors? Once a week (P)      How often do you get together with friends or relatives? Once a week (P)      How often do you attend Gnosticism or Druze services? More than 4 times per year (P)      Do you belong to any clubs or organizations such as Gnosticism groups, unions, fraternal or athletic groups, or school groups? No (P)      How often do you attend  meetings of the clubs or organizations you belong to? Never (P)      Are you , , , , never , or living with a partner? Never  (P)         Alcohol Use    Q1: How often do you have a drink containing alcohol? Never (P)      Q2: How many drinks containing alcohol do you have on a typical day when you are drinking? Patient does not drink (P)      Q3: How often do you have six or more drinks on one occasion? Never (P)                  SW received consult for discharge planning. Pt lives alone, reports he has home health services but does not know the name of the provider, and states he uses a cane. Pt receives dialysis at [facility] ([place], [place]) Tuesday, Thursday and Saturday.  Per pt, discharge plans are to return home and resume home health when medically stable. Spoke with pt's sister, Jazmin Lubin, who states that pt does have HH and she will get the provider's name from her brother who was not home at the time. SS will follow up with pt's sister and fax information to HH provider.  IM obtained. SS following.

## 2023-05-13 NOTE — DIALYSIS ROUNDING
"          Nephrology Department            NAME: Pardeep Collins   YOB: 1940  MRN: [id number]  NOTE DATE: 05/13/2023       Seen in Dialysis Unit.The patient mentions that he does not have much of an appetite    Patient complains:  None.      REVIEW OF SYSTEMS:  he reports no shortness of breath, nausea or vomiting. No acute changes.    VITALS:  height is 5' 8" (1.727 m) and weight is 56.7 kg (125 lb). His oral temperature is 99.1 °F (37.3 °C). His blood pressure is 152/76 (abnormal) and his pulse is 60. His respiration is 12 and oxygen saturation is 100%.  Hemodialysis documentation flowsheet reviewed with dialysis nurse, including weight and vitals.    Resting comfortably, NAD.  Respiration unlabored. Lungs clear.  Heart regular, no rub.  Abdomen soft, nontender, positive bowl sounds  No edema.    Recent Labs   Lab 05/12/23 2000 05/13/23  0527   * 136   K 3.4* 3.2*   CL 95* 96*   CO2 32 31   BUN 15 18   CREATININE 4.51* 4.71*   CALCIUM 8.6 8.6   PHOS  --  4.1     Recent Labs   Lab 05/12/23 2000 05/13/23  0527   WBC 4.11* 2.88*   HGB 7.8* 7.5*   HCT 24.6* 22.8*   * 100*       ASSESSMENT/PLAN:  Continue with scheduled hemodialysis for this patient.    Chandler Aceves Jr, MD   "

## 2023-05-13 NOTE — ASSESSMENT & PLAN NOTE
Patient is under the care of Dr. Aceves and dialyzes on Tuesdays Thursdays and Saturday.  Will consult Dr. Aceves

## 2023-05-13 NOTE — ASSESSMENT & PLAN NOTE
Patient 2 episodes of transient neurological deficits including symptoms of dysarthria and right-sided weakness.  When patient arrived in ED patient had NIHSS of 0.  Initial workup including CT of the head was unremarkable.  Tele neurologist recommended CTA of head and neck and provided that there is no evidence of LVO, patient will be started on DAPT with loading dose of Plavix and a high-intensity statin.  Will also proceed with MRI of brain and echocardiogram

## 2023-05-13 NOTE — CONSULTS
[facility] - [address] [place] [address]  Nephrology  Consult Note    Patient Name: Pardeep Collins  MRN: [id number]  Admission Date: 5/12/2023  Hospital Length of Stay: 0 days  Attending Provider: Dk Syed Jr., MD   Primary Care Physician: Primary Doctor No  Principal Problem:Episode of transient neurologic symptoms    Consults  Subjective:     HPI: This gentleman with history of HTN, anemia, gout who recently started dialysis within the past month.  The patient presented with dysarthria and weakness that started on yesterday.  He had further work up with CT head showed no acute stroke and has been admitted for TIA. CTA showed no evidence of large vessel occlusion. He is awake and alert. He is dysarthric. He denies any shortness or chest pain. Nephrology is consulted for renal issues.      Past Medical History:   Diagnosis Date    Dialysis patient     Elevated PSA     Gout, unspecified     Hypertension     Renal disorder     Rheumatoid arthritis, unspecified        Past Surgical History:   Procedure Laterality Date    AV FISTULA PLACEMENT Right 3/20/2023    Procedure: CREATION, AV FISTULA;  Surgeon: Alfred Sierra MD;  Location: [facility], [place];  Service: General;  Laterality: Right;  right basilic vein transposition    HAND SURGERY Left     urolift      in [place];       Review of patient's allergies indicates:  No Known Allergies  Current Facility-Administered Medications   Medication Frequency    acetaminophen tablet 650 mg Q4H PRN    aspirin EC tablet 81 mg Daily    atorvastatin tablet 80 mg QHS    clopidogreL tablet 75 mg Daily    enoxaparin injection 40 mg Daily    HYDROcodone-acetaminophen 5-325 mg per tablet 1 tablet Q6H PRN    naloxone 0.4 mg/mL injection 0.02 mg PRN    ondansetron injection 4 mg Q8H PRN    sodium chloride 0.9% flush 10 mL Q12H PRN     Family History       Problem Relation (Age of Onset)    Breast cancer Sister    Heart disease Father          Tobacco Use     Smoking status: Former     Types: Cigarettes     Quit date:      Years since quittin.3    Smokeless tobacco: Never   Substance and Sexual Activity    Alcohol use: Not Currently     Comment: quit in     Drug use: Never    Sexual activity: Not Currently     Review of Systems   All other systems reviewed and are negative.  Objective:     Vital Signs (Most Recent):  Temp: 97.3 °F (36.3 °C) (23 1118)  Pulse: 73 (23 1118)  Resp: 14 (23 1118)  BP: (!) 159/80 (23 1118)  SpO2: 100 % (23 1118) Vital Signs (24h Range):  Temp:  [97.3 °F (36.3 °C)-98.7 °F (37.1 °C)] 97.3 °F (36.3 °C)  Pulse:  [63-90] 73  Resp:  [13-18] 14  SpO2:  [98 %-100 %] 100 %  BP: (132-188)/(72-94) 159/80     Weight: 56.7 kg (125 lb) (23 0703)  Body mass index is 19.01 kg/m².  Body surface area is 1.65 meters squared.    I/O last 3 completed shifts:  In: -   Out: 200 [Urine:200]     Physical Exam  Vitals reviewed.   Constitutional:       Appearance: He is normal weight.   HENT:      Head: Normocephalic and atraumatic.      Mouth/Throat:      Mouth: Mucous membranes are moist.   Eyes:      Pupils: Pupils are equal, round, and reactive to light.   Cardiovascular:      Rate and Rhythm: Regular rhythm.      Pulses: Normal pulses.   Pulmonary:      Effort: Pulmonary effort is normal.   Abdominal:      Palpations: Abdomen is soft.   Musculoskeletal:         General: Normal range of motion.   Skin:     General: Skin is warm.   Neurological:      Mental Status: He is alert and oriented to person, place, and time.      Comments: Dysarthric, problems with articulating.   Psychiatric:         Mood and Affect: Mood normal.        Significant Labs:  BMP:   Recent Labs   Lab 2327   * 76   * 136   K 3.4* 3.2*   CL 95* 96*   CO2 32 31   BUN 15 18   CREATININE 4.51* 4.71*   CALCIUM 8.6 8.6   MG 2.0  --      CBC:   Recent Labs   Lab 23  0527   WBC 2.88*   RBC 2.32*   HGB 7.5*    HCT 22.8*   *   MCV 98.3*   MCH 32.3*   MCHC 32.9       Significant Imaging:  Labs: Reviewed    Assessment/Plan:     Cardiac/Vascular  Primary hypertension  Chronic condition    Renal/  ESRD (end stage renal disease)  New to dialysis.  Volume status is stable.    Oncology  Anemia  Chronic kidney disease      Dialysis today.  Thank you for your consult. I will follow-up with patient. Please contact us if you have any additional questions.    Chandler Aceves Jr, MD  Nephrology  [facility] - [address] [place] [address]

## 2023-05-13 NOTE — SUBJECTIVE & OBJECTIVE
Past Medical History:   Diagnosis Date    Dialysis patient     Elevated PSA     Gout, unspecified     Hypertension     Renal disorder     Rheumatoid arthritis, unspecified        Past Surgical History:   Procedure Laterality Date    AV FISTULA PLACEMENT Right 3/20/2023    Procedure: CREATION, AV FISTULA;  Surgeon: Alfred Sierra MD;  Location: [facility], [place];  Service: General;  Laterality: Right;  right basilic vein transposition    HAND SURGERY Left     urolift      in [place];       Review of patient's allergies indicates:  No Known Allergies  Current Facility-Administered Medications   Medication Frequency    acetaminophen tablet 650 mg Q4H PRN    aspirin EC tablet 81 mg Daily    atorvastatin tablet 80 mg QHS    clopidogreL tablet 75 mg Daily    enoxaparin injection 40 mg Daily    HYDROcodone-acetaminophen 5-325 mg per tablet 1 tablet Q6H PRN    naloxone 0.4 mg/mL injection 0.02 mg PRN    ondansetron injection 4 mg Q8H PRN    sodium chloride 0.9% flush 10 mL Q12H PRN     Family History       Problem Relation (Age of Onset)    Breast cancer Sister    Heart disease Father          Tobacco Use    Smoking status: Former     Types: Cigarettes     Quit date:      Years since quittin.3    Smokeless tobacco: Never   Substance and Sexual Activity    Alcohol use: Not Currently     Comment: quit in     Drug use: Never    Sexual activity: Not Currently     Review of Systems   All other systems reviewed and are negative.  Objective:     Vital Signs (Most Recent):  Temp: 97.3 °F (36.3 °C) (23 1118)  Pulse: 73 (23 1118)  Resp: 14 (23 1118)  BP: (!) 159/80 (23 1118)  SpO2: 100 % (23 1118) Vital Signs (24h Range):  Temp:  [97.3 °F (36.3 °C)-98.7 °F (37.1 °C)] 97.3 °F (36.3 °C)  Pulse:  [63-90] 73  Resp:  [13-18] 14  SpO2:  [98 %-100 %] 100 %  BP: (132-188)/(72-94) 159/80     Weight: 56.7 kg (125 lb) (23 0703)  Body mass index is 19.01 kg/m².  Body surface area is 1.65 meters  squared.    I/O last 3 completed shifts:  In: -   Out: 200 [Urine:200]     Physical Exam  Vitals reviewed.   Constitutional:       Appearance: He is normal weight.   HENT:      Head: Normocephalic and atraumatic.      Mouth/Throat:      Mouth: Mucous membranes are moist.   Eyes:      Pupils: Pupils are equal, round, and reactive to light.   Cardiovascular:      Rate and Rhythm: Regular rhythm.      Pulses: Normal pulses.   Pulmonary:      Effort: Pulmonary effort is normal.   Abdominal:      Palpations: Abdomen is soft.   Musculoskeletal:         General: Normal range of motion.   Skin:     General: Skin is warm.   Neurological:      Mental Status: He is alert and oriented to person, place, and time.      Comments: Dysarthric, problems with articulating.   Psychiatric:         Mood and Affect: Mood normal.        Significant Labs:  BMP:   Recent Labs   Lab 05/12/23 2000 05/13/23  0527   * 76   * 136   K 3.4* 3.2*   CL 95* 96*   CO2 32 31   BUN 15 18   CREATININE 4.51* 4.71*   CALCIUM 8.6 8.6   MG 2.0  --      CBC:   Recent Labs   Lab 05/13/23  0527   WBC 2.88*   RBC 2.32*   HGB 7.5*   HCT 22.8*   *   MCV 98.3*   MCH 32.3*   MCHC 32.9       Significant Imaging:  Labs: Reviewed

## 2023-05-13 NOTE — ED PROVIDER NOTES
Encounter Date: 2023       History     Chief Complaint   Patient presents with    Weakness     Ems from home - family states had an episode at 11am - then again around 3 while on phone - when ems arrived  complete R side weakness and slurred speech - upon er arrival pt fine - quoted ss# - to ct - hx of dialysis      81 yo m arrives via ems for right sided weakness and difficulty speaking.  They report that he had these same symptoms around 11am that cleared up spontaneously.  This current episode occurred around 30 minutes ago but resolved before arrival. He currently denies any weakness or numbness.    Review of patient's allergies indicates:  No Known Allergies  Past Medical History:   Diagnosis Date    Dialysis patient     Elevated PSA     Gout, unspecified     Hypertension     Renal disorder     Rheumatoid arthritis, unspecified      Past Surgical History:   Procedure Laterality Date    AV FISTULA PLACEMENT Right 3/20/2023    Procedure: CREATION, AV FISTULA;  Surgeon: Alfred Sierra MD;  Location: [facility];  Service: General;  Laterality: Right;  right basilic vein transposition    HAND SURGERY Left     urolift      in [place];     Family History   Problem Relation Age of Onset    Heart disease Father     Breast cancer Sister      Social History     Tobacco Use    Smoking status: Former     Types: Cigarettes     Quit date:      Years since quittin.3    Smokeless tobacco: Never   Substance Use Topics    Alcohol use: Not Currently     Comment: quit in     Drug use: Never     Review of Systems   Cardiovascular:  Negative for palpitations.   Neurological:  Negative for dizziness, facial asymmetry, speech difficulty, weakness, light-headedness, numbness and headaches.     Physical Exam     Initial Vitals [23]   BP Pulse Resp Temp SpO2   132/72 66 18 98.7 °F (37.1 °C) 100 %      MAP       --         Physical Exam    Constitutional: He appears well-developed and well-nourished.   HENT:    Head: Normocephalic and atraumatic.   Eyes: EOM are normal. Pupils are equal, round, and reactive to light.   Neck:   Normal range of motion.  Cardiovascular:  Normal rate and normal heart sounds.           Pulmonary/Chest: Breath sounds normal.   Abdominal: Abdomen is soft.   Musculoskeletal:         General: Normal range of motion.      Cervical back: Normal range of motion.     Neurological: He is alert and oriented to person, place, and time. He has normal strength.   All neurological exams returned to base line at time of exam.   Psychiatric: He has a normal mood and affect. His behavior is normal. Thought content normal.       Medical Screening Exam   See Full Note    ED Course   Procedures  Labs Reviewed   COMPREHENSIVE METABOLIC PANEL - Abnormal; Notable for the following components:       Result Value    Sodium 134 (*)     Potassium 3.4 (*)     Chloride 95 (*)     Glucose 108 (*)     Creatinine 4.51 (*)     BUN/Creatinine Ratio 3 (*)     Albumin 2.9 (*)     ALT 13 (*)     AST 11 (*)     eGFR 12 (*)     All other components within normal limits   LIPID PANEL - Abnormal; Notable for the following components:    HDL Cholesterol 33 (*)     All other components within normal limits   CBC WITH DIFFERENTIAL - Abnormal; Notable for the following components:    WBC 4.11 (*)     RBC 2.48 (*)     Hemoglobin 7.8 (*)     Hematocrit 24.6 (*)     MCV 99.2 (*)     MCH 31.5 (*)     MCHC 31.7 (*)     Platelet Count 118 (*)     MPV 12.5 (*)     Neutrophils % 49.9 (*)     Monocytes % 12.4 (*)     Eosinophils % 4.4 (*)     All other components within normal limits   MANUAL DIFFERENTIAL - Abnormal; Notable for the following components:    Platelet Morphology Decreased (*)     All other components within normal limits   FERRITIN - Abnormal; Notable for the following components:    Ferritin 426 (*)     All other components within normal limits   IRON AND TIBC - Abnormal; Notable for the following components:    Iron 33 (*)      TIBC 171 (*)     All other components within normal limits   POCT GLUCOSE MONITORING CONTINUOUS - Abnormal; Notable for the following components:    POC Glucose 117 (*)     All other components within normal limits   PROTIME-INR - Normal   MAGNESIUM - Normal   VITAMIN B12/FOLATE, SERUM PANEL - Normal   SARS-COV-2 RNA AMPLIFICATION, QUAL - Normal    Narrative:     Negative SARS-CoV results should not be used as the sole basis for treatment or patient management decisions; negative results should be considered in the context of a patient's recent exposures, history and the presene of clinical signs and symptoms consistent with COVID-19.  Negative results should be treated as presumptive and confirmed by molecular assay, if necessary for patient management.   CBC W/ AUTO DIFFERENTIAL    Narrative:     The following orders were created for panel order CBC W/ AUTO DIFFERENTIAL.  Procedure                               Abnormality         Status                     ---------                               -----------         ------                     CBC with Differential[[id number]]        Abnormal            Final result               Manual Differential[[id number]]          Abnormal            Final result                 Please view results for these tests on the individual orders.   EXTRA TUBES    Narrative:     The following orders were created for panel order EXTRA TUBES.  Procedure                               Abnormality         Status                     ---------                               -----------         ------                     Light Green Top Hold[[id number]]                             In process                 Gold Top Hold[[id number]]                                    In process                   Please view results for these tests on the individual orders.   LIGHT GREEN TOP HOLD   GOLD TOP HOLD   HEMOGLOBIN A1C   POCT OCCULT BLOOD (STOOL)   POCT GLUCOSE MONITORING CONTINUOUS        ECG Results               ECG 12 lead (In process)  Result time 05/13/23 07:14:26      In process by Interface, Lab In [facility] (05/13/23 07:14:26)                   Narrative:    Test Reason : I63.9,    Vent. Rate : 076 BPM     Atrial Rate : 000 BPM     P-R Int : 150 ms          QRS Dur : 082 ms      QT Int : 388 ms       P-R-T Axes : 076 018 041 degrees     QTc Int : 416 ms    Sinus rhythm with PVC(s)  Possible left ventricular hypertrophy  Lateral ST-T abnormality may be due to the hypertrophy and/or ischemia  Abnormal ECG      Referred By: [email or web address]   SELF           Confirmed By:                                   Imaging Results              CTA Head (Final result)  Result time 05/13/23 09:34:03      Final result by Valeriano Isabel MD (05/13/23 09:34:03)                   Impression:      No acute large vessel arterial occlusion    No patent intracranial aneurysm    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    COMPARISON:  Noncontrast CT head April 12, 2013    TECHNIQUE:  Thin spiral CT sections were obtained through the head during the dynamic IV administration of 100 ml of Isovue 3 7.  In addition to multiplanar reconstruction images, 3-D images were also generated, archived, and analyzed.    The exam was also reviewed by [facility].    The CT examination was performed using one or more of the following dose reduction techniques: Automated exposure control, adjustment of the mA and kV according to patient's size, use of acute or iterative reconstruction techniques.    FINDINGS:  There is no patent intracranial aneurysm.    There is no acute large vessel arterial occlusion.    Distal internal carotid arteries exhibit mild atherosclerotic irregularity but no occlusion or focal high-grade  stenosis.  Anterior and middle cerebral arteries are patent.  There is no high-grade mrpnqd-ii-Tztjcs stenosis at this level.    Distal vertebral arteries are patent.  There is some high-grade greater than 70% stenosis of the mid to distal basilar artery.  Posterior cerebral arteries are generally patent.    There is no significant dural venous sinus thrombosis of the major venous sinuses.    There is no enhancing brain mass when compared to the earlier noncontrast CT brain                                       CTA Neck (Final result)  Result time 05/13/23 09:24:15      Final result by Valeriano Isabel MD (05/13/23 09:24:15)                   Impression:      No hemodynamically significant internal carotid artery stenosis at the cervical level.    Severe degenerative disc disease cervical spine    Measurements based on NASCET criteria.    All CT scans at this facility use dose modulation, iterative reconstruction, and/or weight base dosing when appropriate to reduce radiation dose to as low as reasonably achievable.      Electronically signed by: Valeriano Isabel  Date:    05/13/2023  Time:    09:24               Narrative:    EXAMINATION:  CTA NECK    CLINICAL HISTORY:  CVA;    TECHNIQUE:  Thin spiral CT sections were obtained through the neck during the dynamic IV administration of 100 ml of Isovue 370.  In addition to multiplanar reconstruction images, 3-D images were also generated, archived, and analyzed.    The CT examination was performed using one or more of the following dose reduction techniques: Automated exposure control, adjustment of the mA and kV according to patient's size, use of acute or iterative reconstruction techniques.    COMPARISON:  None    FINDINGS:  There is 2 vessel arch anatomy.  There is no hemodynamically significant stenosis at the origins of the great vessels.  Vertebral arteries are codominant and generally patent bilaterally.  Common carotid arteries are generally patent without  hemodynamically significant stenosis.  External carotid arteries are grossly patent.    There is mild calcified plaque in either carotid bulb.  There is 0-10% diameter reduction narrowing of either internal carotid artery.    Normal right ICA measures 4.0 mm diameter; normal left measures 4.4 mm diameter.    There is nonspecific multifocal nodularity of the thyroid gland with dominant left thyroid nodule in the left lobe, not fully characterized.  Recommend obtaining nonemergent outpatient baseline thyroid ultrasound.    There is severe degenerative disc narrowing at C3-C4 through C6-C7.  There is scattered moderate cervical spondylosis.    There is mild to moderate centrilobular emphysema in the partially visualized lung apices.                                       CT Head Without Contrast (Final result)  Result time 05/12/23 19:41:00      Final result by Danny Kline II, MD (05/12/23 19:41:00)                   Impression:      No evidence of acute process or acute infarct.      Electronically signed by: Danny Kline  Date:    05/12/2023  Time:    19:41               Narrative:    EXAMINATION:  CT HEAD WITHOUT CONTRAST    CLINICAL HISTORY:  Transient ischemic attack (TIA);    TECHNIQUE:  Axial CT imaging of the brain is performed without contrast with 3 mm increments.    CT dose reduction technique used - Dose Rite and tube current modulation.    COMPARISON:  None available    FINDINGS:  No evidence of hemorrhage,  mass,  mass effect,  midline shift or acute infarct seen.  There is moderate diffuse cerebral atrophy.  There are areas of decreased density seen within the white matter.  Otherwise the brain parenchyma attenuation and differentiation appears within normal limits. The ventricles and cisterns are normal in caliber.  No cranial or skull base abnormality is identified.                                       Medications   clopidogreL tablet 75 mg (75 mg Oral Given 5/13/23 1000)   aspirin EC tablet  81 mg (81 mg Oral Given 5/13/23 1000)   atorvastatin tablet 80 mg (80 mg Oral Given 5/13/23 2111)   sodium chloride 0.9% flush 10 mL (has no administration in time range)   naloxone 0.4 mg/mL injection 0.02 mg (has no administration in time range)   enoxaparin injection 40 mg (40 mg Subcutaneous Given 5/13/23 1658)   acetaminophen tablet 650 mg (has no administration in time range)   HYDROcodone-acetaminophen 5-325 mg per tablet 1 tablet (has no administration in time range)   ondansetron injection 4 mg (has no administration in time range)   0.9%  NaCl infusion (has no administration in time range)   aspirin tablet 325 mg (325 mg Oral Given 5/12/23 1951)   clopidogreL tablet 300 mg (300 mg Oral Given 5/13/23 0025)   iopamidoL (ISOVUE-370) injection 100 mL (100 mLs Intravenous Given 5/13/23 0020)                 ED Course as of 05/14/23 0045   Fri May 12, 2023   2016 POCT glucose(!) [BB]   2046 Medical decision-making:  Differential diagnosis includes stroke, TIA, seizure with Trevon's paralysis.  All labs and imaging ordered and interpreted by me.  CBC shows anemia with hematocrit 25 which appears to be around baseline when compared to review of outside medical record.  Magnesium level is normal.  CMP is normal except mild hypokalemia and elevated creatinine of 4.5.  I made decision to admit patient and discussed case with internal medicine hospitalist on-call who agrees with admission. [BB]      ED Course User Index  [BB] Joaquim Martinez MD                Clinical Impression:   Final diagnoses:  [G45.9] TIA (transient ischemic attack)        ED Disposition Condition    Observation                 Joaquim Martinez MD  05/14/23 0045

## 2023-05-14 PROBLEM — I63.512 STROKE DUE TO OCCLUSION OF LEFT MIDDLE CEREBRAL ARTERY: Status: ACTIVE | Noted: 2023-05-14

## 2023-05-14 LAB
ALBUMIN SERPL BCP-MCNC: 2.6 G/DL (ref 3.5–5)
ANION GAP SERPL CALCULATED.3IONS-SCNC: 11 MMOL/L (ref 7–16)
BASOPHILS # BLD AUTO: 0.01 K/UL (ref 0–0.2)
BASOPHILS NFR BLD AUTO: 0.2 % (ref 0–1)
BUN SERPL-MCNC: 17 MG/DL (ref 7–18)
BUN/CREAT SERPL: 4 (ref 6–20)
CALCIUM SERPL-MCNC: 8.7 MG/DL (ref 8.5–10.1)
CHLORIDE SERPL-SCNC: 101 MMOL/L (ref 98–107)
CO2 SERPL-SCNC: 30 MMOL/L (ref 21–32)
CREAT SERPL-MCNC: 4.09 MG/DL (ref 0.7–1.3)
DIFFERENTIAL METHOD BLD: ABNORMAL
EGFR (NO RACE VARIABLE) (RUSH/TITUS): 14 ML/MIN/1.73M2
EOSINOPHIL # BLD AUTO: 0.15 K/UL (ref 0–0.5)
EOSINOPHIL NFR BLD AUTO: 3.3 % (ref 1–4)
ERYTHROCYTE [DISTWIDTH] IN BLOOD BY AUTOMATED COUNT: 12.2 % (ref 11.5–14.5)
GLUCOSE SERPL-MCNC: 96 MG/DL (ref 74–106)
HCT VFR BLD AUTO: 24.2 % (ref 40–54)
HGB BLD-MCNC: 7.5 G/DL (ref 13.5–18)
IMM GRANULOCYTES # BLD AUTO: 0.02 K/UL (ref 0–0.04)
IMM GRANULOCYTES NFR BLD: 0.4 % (ref 0–0.4)
LYMPHOCYTES # BLD AUTO: 1.48 K/UL (ref 1–4.8)
LYMPHOCYTES NFR BLD AUTO: 32.1 % (ref 27–41)
MACROCYTES BLD QL SMEAR: ABNORMAL
MCH RBC QN AUTO: 31.6 PG (ref 27–31)
MCHC RBC AUTO-ENTMCNC: 31 G/DL (ref 32–36)
MCV RBC AUTO: 102.1 FL (ref 80–96)
MONOCYTES # BLD AUTO: 0.37 K/UL (ref 0–0.8)
MONOCYTES NFR BLD AUTO: 8 % (ref 2–6)
MPC BLD CALC-MCNC: 13.5 FL (ref 9.4–12.4)
NEUTROPHILS # BLD AUTO: 2.58 K/UL (ref 1.8–7.7)
NEUTROPHILS NFR BLD AUTO: 56 % (ref 53–65)
NRBC # BLD AUTO: 0 X10E3/UL
NRBC, AUTO (.00): 0 %
OVALOCYTES BLD QL SMEAR: ABNORMAL
PHOSPHATE SERPL-MCNC: 3.8 MG/DL (ref 2.5–4.5)
PLATELET # BLD AUTO: 112 K/UL (ref 150–400)
PLATELET MORPHOLOGY: ABNORMAL
POTASSIUM SERPL-SCNC: 3.5 MMOL/L (ref 3.5–5.1)
RBC # BLD AUTO: 2.37 M/UL (ref 4.6–6.2)
SODIUM SERPL-SCNC: 138 MMOL/L (ref 136–145)
WBC # BLD AUTO: 4.61 K/UL (ref 4.5–11)

## 2023-05-14 PROCEDURE — 25000003 PHARM REV CODE 250

## 2023-05-14 PROCEDURE — 99233 PR SUBSEQUENT HOSPITAL CARE,LEVL III: ICD-10-PCS | Mod: ,,, | Performed by: NURSE PRACTITIONER

## 2023-05-14 PROCEDURE — 99233 SBSQ HOSP IP/OBS HIGH 50: CPT | Mod: ,,, | Performed by: NURSE PRACTITIONER

## 2023-05-14 PROCEDURE — 25000003 PHARM REV CODE 250: Performed by: INTERNAL MEDICINE

## 2023-05-14 PROCEDURE — 11000001 HC ACUTE MED/SURG PRIVATE ROOM

## 2023-05-14 PROCEDURE — 96374 THER/PROPH/DIAG INJ IV PUSH: CPT

## 2023-05-14 PROCEDURE — 25000003 PHARM REV CODE 250: Performed by: NURSE PRACTITIONER

## 2023-05-14 PROCEDURE — G0508 PR CRITICAL CARE TELEHLTH INITIAL CONSULT 60MIN: ICD-10-PCS | Mod: GT,,, | Performed by: STUDENT IN AN ORGANIZED HEALTH CARE EDUCATION/TRAINING PROGRAM

## 2023-05-14 PROCEDURE — 25500020 PHARM REV CODE 255: Performed by: FAMILY MEDICINE

## 2023-05-14 PROCEDURE — 92610 EVALUATE SWALLOWING FUNCTION: CPT

## 2023-05-14 PROCEDURE — G0508 CRIT CARE TELEHEA CONSULT 60: HCPCS | Mod: GT,,, | Performed by: STUDENT IN AN ORGANIZED HEALTH CARE EDUCATION/TRAINING PROGRAM

## 2023-05-14 PROCEDURE — G0378 HOSPITAL OBSERVATION PER HR: HCPCS

## 2023-05-14 PROCEDURE — 25000003 PHARM REV CODE 250: Performed by: FAMILY MEDICINE

## 2023-05-14 PROCEDURE — 63600175 PHARM REV CODE 636 W HCPCS: Mod: JZ,JG

## 2023-05-14 PROCEDURE — A4216 STERILE WATER/SALINE, 10 ML: HCPCS

## 2023-05-14 PROCEDURE — 80069 RENAL FUNCTION PANEL: CPT | Performed by: INTERNAL MEDICINE

## 2023-05-14 PROCEDURE — 85025 COMPLETE CBC W/AUTO DIFF WBC: CPT | Performed by: INTERNAL MEDICINE

## 2023-05-14 RX ORDER — SODIUM CHLORIDE 0.9 % (FLUSH) 0.9 %
10 SYRINGE (ML) INJECTION ONCE
Status: COMPLETED | OUTPATIENT
Start: 2023-05-14 | End: 2023-05-14

## 2023-05-14 RX ORDER — MUPIROCIN 20 MG/G
OINTMENT TOPICAL 2 TIMES DAILY
Status: DISPENSED | OUTPATIENT
Start: 2023-05-14 | End: 2023-05-19

## 2023-05-14 RX ORDER — POLYETHYLENE GLYCOL 3350 17 G/17G
17 POWDER, FOR SOLUTION ORAL DAILY
Status: DISCONTINUED | OUTPATIENT
Start: 2023-05-14 | End: 2023-05-22 | Stop reason: HOSPADM

## 2023-05-14 RX ORDER — DOCUSATE SODIUM 100 MG/1
100 CAPSULE, LIQUID FILLED ORAL 2 TIMES DAILY PRN
Status: DISCONTINUED | OUTPATIENT
Start: 2023-05-14 | End: 2023-05-22 | Stop reason: HOSPADM

## 2023-05-14 RX ORDER — TRANEXAMIC ACID 100 MG/ML
1000 INJECTION, SOLUTION INTRAVENOUS ONCE
Status: DISCONTINUED | OUTPATIENT
Start: 2023-05-14 | End: 2023-05-14 | Stop reason: CLARIF

## 2023-05-14 RX ORDER — LABETALOL HYDROCHLORIDE 5 MG/ML
10 INJECTION, SOLUTION INTRAVENOUS EVERY 4 HOURS PRN
Status: DISCONTINUED | OUTPATIENT
Start: 2023-05-14 | End: 2023-05-22 | Stop reason: HOSPADM

## 2023-05-14 RX ADMIN — MUPIROCIN: 20 OINTMENT TOPICAL at 09:05

## 2023-05-14 RX ADMIN — TRANEXAMIC ACID 1000 MG: 100 INJECTION, SOLUTION INTRAVENOUS at 04:05

## 2023-05-14 RX ADMIN — SODIUM CHLORIDE, PRESERVATIVE FREE 10 ML: 5 INJECTION INTRAVENOUS at 03:05

## 2023-05-14 RX ADMIN — ATORVASTATIN CALCIUM 80 MG: 80 TABLET, FILM COATED ORAL at 09:05

## 2023-05-14 RX ADMIN — POLYETHYLENE GLYCOL 3350 17 G: 17 POWDER, FOR SOLUTION ORAL at 11:05

## 2023-05-14 RX ADMIN — TENECTEPLASE 14 MG: KIT at 02:05

## 2023-05-14 RX ADMIN — MUPIROCIN: 20 OINTMENT TOPICAL at 11:05

## 2023-05-14 RX ADMIN — IOPAMIDOL 100 ML: 755 INJECTION, SOLUTION INTRAVENOUS at 02:05

## 2023-05-14 NOTE — NURSING
,Resident, notified at this time via phone and per MARCUS Arthur RN about pt reporting increased weakness and as evidenced by Right Hand  weaker than earlier this shift, increased slurred speech and no movement noted to Right Foot, which pt denying pain and N/V at this time. Receive report from MD to continue to monitor pt within 10minutes and notify MD of results.

## 2023-05-14 NOTE — PROGRESS NOTES
[facility]  Pulmonology  Progress Note    Patient Name: Paredep Collins  MRN: [id number]  Admission Date: 5/12/2023  Hospital Length of Stay: 0 days  Code Status: Full Code  Attending Provider: Dk Syed Jr., MD  Primary Care Provider: Primary Doctor No   Principal Problem: Episode of transient neurologic symptoms    Subjective:     Interval History: Pt resting in bed. Updated on events overnight. Pt able to nod in understanding. PT/OT consulted. ST to see.       Objective:     Vital Signs (Most Recent):  Temp: 98.1 °F (36.7 °C) (05/14/23 1115)  Pulse: 68 (05/14/23 1245)  Resp: 13 (05/14/23 1245)  BP: 136/63 (05/14/23 1245)  SpO2: 100 % (05/14/23 1245) Vital Signs (24h Range):  Temp:  [98 °F (36.7 °C)-99.1 °F (37.3 °C)] 98.1 °F (36.7 °C)  Pulse:  [] 68  Resp:  [9-35] 13  SpO2:  [97 %-100 %] 100 %  BP: (113-200)/() 136/63     Weight: 56.7 kg (125 lb)  Body mass index is 19.01 kg/m².      Intake/Output Summary (Last 24 hours) at 5/14/2023 1523  Last data filed at 5/14/2023 0601  Gross per 24 hour   Intake 0 ml   Output 275 ml   Net -275 ml        Physical Exam  Vitals reviewed.   Constitutional:       General: He is not in acute distress.     Appearance: Normal appearance.   HENT:      Head: Normocephalic and atraumatic.      Right Ear: External ear normal.      Left Ear: External ear normal.   Eyes:      Extraocular Movements: Extraocular movements intact.      Pupils: Pupils are equal, round, and reactive to light.   Cardiovascular:      Rate and Rhythm: Normal rate and regular rhythm.      Pulses: Normal pulses.      Heart sounds: Normal heart sounds. No murmur heard.  Pulmonary:      Effort: Pulmonary effort is normal. No respiratory distress.      Breath sounds: Normal breath sounds. No wheezing.   Chest:      Chest wall: No tenderness.   Abdominal:      General: Abdomen is flat.      Palpations: Abdomen is soft. There is no mass.      Tenderness: There is no abdominal  tenderness. There is no right CVA tenderness or left CVA tenderness.   Musculoskeletal:         General: No swelling or tenderness. Normal range of motion.   Skin:     General: Skin is warm and dry.      Capillary Refill: Capillary refill takes less than 2 seconds.   Neurological:      General: No focal deficit present.      Mental Status: He is alert and oriented to person, place, and time. Mental status is at baseline.      Comments: Patient is alert, dysarthric with unintelligible speech. Right facial weakness. Faint movement to RUE.          Review of Systems    Vents:  Oxygen Concentration (%): 28 (05/13/23 1938)    Lines/Drains/Airways       Drain  Duration                  Hemodialysis AV Fistula 03/20/23 Right upper arm 55 days              Peripheral Intravenous Line  Duration                  Peripheral IV - Single Lumen 05/12/23 18 G Left;Posterior Hand 2 days                    Significant Labs:    CBC/Anemia Profile:  Recent Labs   Lab 05/12/23 2000 05/13/23 0527 05/14/23  0658   WBC 4.11* 2.88* 4.61   HGB 7.8* 7.5* 7.5*   HCT 24.6* 22.8* 24.2*   * 100* 112*   MCV 99.2* 98.3* 102.1*   RDW 12.0 11.9 12.2   IRON 33*  --   --    FERRITIN 426*  --   --    FOLATE 7.6  --   --    [id number] 913  --   --         Chemistries:  Recent Labs   Lab 05/12/23 2000 05/13/23 0527 05/14/23  0658   * 136 138   K 3.4* 3.2* 3.5   CL 95* 96* 101   CO2 32 31 30   BUN 15 18 17   CREATININE 4.51* 4.71* 4.09*   CALCIUM 8.6 8.6 8.7   ALBUMIN 2.9* 2.6* 2.6*   PROT 6.9  --   --    BILITOT 0.6  --   --    ALKPHOS 63  --   --    ALT 13*  --   --    AST 11*  --   --    MG 2.0  --   --    PHOS  --  4.1 3.8       All pertinent labs within the past 24 hours have been reviewed.    Significant Imaging:  I have reviewed all pertinent imaging results/findings within the past 24 hours.    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  Volume status is stable.  Nephrology following     Oncology  Anemia  Chronic kidney disease  H/H stable at 7/24                  MAX Gutierrez-TASIA  Pulmonology  [facility]

## 2023-05-14 NOTE — PT/OT/SLP PROGRESS
Occupational Therapy      Patient Name:  Pardeep Collins   MRN:  [id number]    Patient not seen today secondary to Nurse/ LINDA hold. Will follow-up 5/15/23.    5/14/2023

## 2023-05-14 NOTE — NURSING
Notify  via phone of pt weakness with no right hand  and no movement to Right foot. See Vital Signs Flowsheet. Receive report that MD will assess pt.

## 2023-05-14 NOTE — CARE UPDATE
Called by nurse at 12:48 am and informed patient had called for help at 12:45 am due to suddenly not being able to move right leg or arm at all. Patient had had brief change in motor function yesterday evening of that side that had completely resolved. Patient found unable to move right leg or arm and with new facial drop. Telestroke was consulted and TPA was recommended and for patient to be transferred to ICU. Neurologist recommended follow up CTA head and neck after TPA finished. Patient will need dialysis tomorrow as he will get contrast.

## 2023-05-14 NOTE — NURSING
Phone call to Jazmin Lubin, sister of pt, at this time and rec'd no answer and voicemail reports full and no messages able to be left to # [phone].

## 2023-05-14 NOTE — CONSULTS
[facility]  Vascular Neurology  Comprehensive Stroke Center  TeleVascular Neurology Acute Consultation Note      Consult to Telemedicine - Acute Stroke  Consult performed by: Elaine Sierra MD  Consult ordered by: Geni Lang MD        Consulting Provider: LOPEZ KINCAID  Current Providers  No providers found    Patient Location:  [address] MEDICAL TELEM* Emergency Department  [facility] hospital nurse at bedside with patient assisting consultant.     Patient information was obtained from patient.         Assessment/Plan:       Diagnoses:   Neuro  Right-sided weakness  Dysarthria  82M presenting with right hemiparesis and dysarthria/speech difficulties. LKN 0100. He presented with similar symptoms yesterday with full resolution/no residual symptoms per primary team.     NCCTH reviewed with no acute intracranial hemorrhage or evidence of new large territory ischemia. Given full resolution of symptoms prior to this event and no contraindications, patient is a candidate for IV thrombolytic therapy. Recommend IV tPA or TNK at this time after which the patient will need to closely monitored in the ICU (or transferred to the nearest facility with neurocritical care monitoring).     After administration of IV TNK, recommend repeat CTA head/neck to evaluate for any new large vessel occlusion that might significantly increase risk of recurrent or worsening signs/symptoms. If there is an LVO, then the patient will be transferred for consideration of thrombectomy.  - Recommend follow-up non-urgent MRI brain without contrast to evaluate for acute ischemia.  - If above studies are abnormal, please load images to teleneurology imaging system and contact us to review.    Antithrombotics for secondary stroke prevention: Antiplatelets: None: Hold all Antithrombotics x 24 hours after IV Thrombolytic therapy administration    Statins for secondary stroke prevention and hyperlipidemia, if present:  Recommend initiation or continuation of a high-intensity statin for goal LDL < 70mg/dL.    Aggressive risk factor modification: HTN     Rehab efforts: The patient has been evaluated by a stroke team provider and the therapy needs have been fully considered based off the presenting complaints and exam findings. The following therapy evaluations are needed: PT evaluate and treat, OT evaluate and treat, SLP evaluate and treat, PM&R evaluate for appropriate placement    Diagnostics recommended:   - CTA head/neck with contrast  - MRI brain without contrast   - TTE without bubble study, monitor on telemetry while admitted  - Labs: hemoglobin A1c (goal <7%), lipid panel (LDL goal <70mg/dL), TSH  - Treat hyperglycemia to achieve a blood glucose level in a range of 140-180 mg/dL and to closely monitor to prevent hypoglycemia (Class IIa, Level C-LD).    VTE prophylaxis: Mechanical prophylaxis: Place SCDs    BP parameters: Infarct: Post Thrombolytic therapy, SBP <180      Please contact us with any further questions or concerns or if patient has any acute neurological changes (new symptoms, worsening deficits).            STROKE DOCUMENTATION     Acute Stroke Times:   Acute Stroke Times   Last Known Normal Date: 05/14/23  Last Known Normal Time: 0100  Symptom Onset Date: 05/14/23  Symptom Onset Time: 0100  Stroke Team Called Date: 05/14/23  Stroke Team Called Time: 0151  Stroke Team Arrival Date: 05/14/23  Stroke Team Arrival Time: 0153  CT Interpretation Time: 0159  Thrombolytic Therapy Recommended: Yes  Decision to Treat Time for Tenecteplase: 0200    NIH Scale:  Interval: baseline  1a. Level of Consciousness: 0-->Alert, keenly responsive  1b. LOC Questions: 2-->Answers neither question correctly  1c. LOC Commands: 0-->Performs both tasks correctly  2. Best Gaze: 0-->Normal  3. Visual: 0-->No visual loss  4. Facial Palsy: 2-->Partial paralysis (total or near-total paralysis of lower face)  5a. Motor Arm, Left: 0-->No drift,  "limb holds 90 (or 45) degrees for full 10 secs  5b. Motor Arm, Right: 3-->No effort against gravity, limb falls  6a. Motor Leg, Left: 0-->No drift, leg holds 30 degree position for full 5 secs  6b. Motor Leg, Right: 3-->No effort against gravity, leg falls to bed immediately  7. Limb Ataxia: 0-->Absent  8. Sensory: 0-->Normal, no sensory loss  9. Best Language: 1-->Mild-to-moderate aphasia, some obvious loss of fluency or facility of comprehension, without significant limitation on ideas expressed or form of expression. Reduction of speech and/or comprehension, however, makes conversation. . . (see row details)  10. Dysarthria: 2-->Severe dysarthria, patients speech is so slurred as to be unintelligible in the absence of or out of proportion to any dysphasia, or is mute/anarthric  11. Extinction and Inattention (formerly Neglect): 0-->No abnormality  Total (NIH Stroke Scale): 13     Modified Alexandro Score: 0  Julienne Coma Scale:    ABCD2 Score:    [id number] Score:   HAS -BLED Score:   ICH Score:   Hunt & Sears Classification:       Blood pressure (!) 155/77, pulse 72, temperature 98.2 °F (36.8 °C), temperature source Oral, resp. rate 18, height 5' 8" (1.727 m), weight 56.7 kg (125 lb), SpO2 100 %.  Eligible for thrombolytic therapy?: Yes  Thrombolytic therapy recomended: Recommend IV Tenecteplase 0.25mg/kg IV push (max dose 25mg); If Tenecteplase is not available use Alteplase 0.9mg/kg IV bolus followed by infusion (max dose 90mg)     Additional Recommendations:   Neurological assessment and vital signs (except temperature) every 15 minutes x 2 hours, then every 30 minutes x 6 hours, then every hour x 16 hours..  Frequency of BP assessments may need to be increased if systolic BP stays >= 180 mm Hg or diastolic BP stays >= 105 mm Hg. Administer antihypertensive meds as ordered  Temperature every 4 hours or as required.  Follow hospital protocol for further orders re: post thrombolytic therapy patient " management.  No antithrombotics or anticoagulants (including but not limited to: heparin, warfarin, aspirin, clopidigrel, or dipyridamole) for 24 hours, then start antithrombotics as ordered by treating physician    Adapted from the American Heart Association/American Stroke Association (AHA/ASA) and American Association of Neuroscience Nurses (AANN) Guidelines.   Possible Interventional Revascularization Candidate? Awaiting CTA results from [facility] for determination     Disposition Recommendation: pending further studies    Subjective:     History of Present Illness:  82M with a PMHx significant for HTN and ESRD on HD presented with slurred speech and right-sided weakness 05/13. Symptoms resolved. Had a brief episode of symptom recurrence while receiving dialysis today.     He was last seen normal, at his baseline around 1245AM. Then developed symptoms. /78.              Woke up with symptoms?: no    Recent bleeding noted: no     Does the patient take any Blood Thinners? no     Medications: Antiplatelets:  aspirin and clopidogrel/Plavix    Past Medical History: hypertension    Past Surgical History: no relevant surgical history    Family History: no relevant history    Social History: no smoking, no drinking, no drugs    Allergies: No Known Allergies     Review of Systems   The following systems were reviewed with pertinent positives and negatives documented in the HPI: Constitutional, Eyes, CV, Respiratory, GI, , Musculoskeletal, Skin, Neurological, Psychiatric      Objective:   Vitals: Blood pressure 132/72, pulse 66, temperature 98.7 °F (37.1 °C), resp. rate 18, weight 60.8 kg (134 lb), SpO2 100 %.     CT READ: Yes  No hemmorhage. No mass effect. No early infarct signs.     Physical Exam  Vitals reviewed.   Constitutional:       Appearance: Normal appearance.   HENT:      Head: Normocephalic and atraumatic.   Eyes:      General: Lids are normal.      Extraocular Movements: Extraocular movements intact.    Cardiovascular:      Rate and Rhythm: Normal rate and regular rhythm.   Pulmonary:      Effort: Pulmonary effort is normal. No respiratory distress.   Abdominal:      General: Abdomen is flat. There is no distension.   Musculoskeletal:         General: No deformity or signs of injury. Normal range of motion.      Cervical back: Normal range of motion. No rigidity.   Neurological:      Patient is alert, dysarthric with unintelligible speech. Right facial weakness. Cannot lift RUE/RLE to antigravity. Exam does not change with HOB flat.   Psychiatric:         Mood and Affect: Mood normal.         Behavior: Behavior normal.             Recommended the emergency room physician to have a brief discussion with the patient and/or family if available regarding the  risks and benefits of treatment, and to briefly document the occurrence of that discussion in his clinical encounter note.     The attending portion of this evaluation, treatment, and documentation was performed per Elaine Sierra MD via audiovisual.    Billing code:  (time dependent stroke, complex case, unstable patient, hemorrhages, any intervention, some mimics)    This patient has a very critical neurological condition/illness, with very high morbidity and mortality.  There is a very high probability for acute neurological change leading to clinical and possibly life-threatening deterioration requiring highest level of physician preparedness for urgent intervention.  There is possibility that this condition will require treatment with high risk medications as quickly as possible.  There is also a possibility that the patient may benefit from further, more advance complex therapies (e.g. endovascular therapy) that will require prompt diagnosis and care.  Care was coordinated with other physicians involved in the patient's care.  Radiologic studies and laboratory data were reviewed and interpreted, and plan of care was re-assessed based on the  results.  Diagnosis, treatment options and prognosis may have been discussed with the patient and/or family members or caregiver.  Further advanced medical management and further evaluation is warranted for his care.    In your opinion, this was a: Tier 1 Van Positive    Consult End Time: 2:15 AM     Elaine Sierra MD, PhD  [facility]  Vascular Neurology   [facility]

## 2023-05-14 NOTE — NURSING
Patient had an episode of arterial bleeding from AV fistula in right arm after receiving TNK for stroke symptoms. Dr. Henry called and pressure held to site collectively for a hour and a half before bleeding finally subsided. Attempted to control bleeding first with surgicel and was unsuccessful. Next tried tranexamic acid applied to 4x4s and placed directly to site. Did this and continued to hold pressure for 15 minutes. Bleeding subsided enough to apply pressure dressing to patient's arm. Dressing is clean, dry and intact. No more bleeding noted. Patient's vss. No new complaints/problems at this time. Will continue to monitor.

## 2023-05-14 NOTE — SUBJECTIVE & OBJECTIVE
Interval History: Pt resting in bed. Updated on events overnight. Pt able to nod in understanding. PT/OT consulted. ST to see.       Objective:     Vital Signs (Most Recent):  Temp: 98.1 °F (36.7 °C) (05/14/23 1115)  Pulse: 68 (05/14/23 1245)  Resp: 13 (05/14/23 1245)  BP: 136/63 (05/14/23 1245)  SpO2: 100 % (05/14/23 1245) Vital Signs (24h Range):  Temp:  [98 °F (36.7 °C)-99.1 °F (37.3 °C)] 98.1 °F (36.7 °C)  Pulse:  [] 68  Resp:  [9-35] 13  SpO2:  [97 %-100 %] 100 %  BP: (113-200)/() 136/63     Weight: 56.7 kg (125 lb)  Body mass index is 19.01 kg/m².      Intake/Output Summary (Last 24 hours) at 5/14/2023 1523  Last data filed at 5/14/2023 0601  Gross per 24 hour   Intake 0 ml   Output 275 ml   Net -275 ml        Physical Exam  Vitals reviewed.   Constitutional:       General: He is not in acute distress.     Appearance: Normal appearance.   HENT:      Head: Normocephalic and atraumatic.      Right Ear: External ear normal.      Left Ear: External ear normal.   Eyes:      Extraocular Movements: Extraocular movements intact.      Pupils: Pupils are equal, round, and reactive to light.   Cardiovascular:      Rate and Rhythm: Normal rate and regular rhythm.      Pulses: Normal pulses.      Heart sounds: Normal heart sounds. No murmur heard.  Pulmonary:      Effort: Pulmonary effort is normal. No respiratory distress.      Breath sounds: Normal breath sounds. No wheezing.   Chest:      Chest wall: No tenderness.   Abdominal:      General: Abdomen is flat.      Palpations: Abdomen is soft. There is no mass.      Tenderness: There is no abdominal tenderness. There is no right CVA tenderness or left CVA tenderness.   Musculoskeletal:         General: No swelling or tenderness. Normal range of motion.   Skin:     General: Skin is warm and dry.      Capillary Refill: Capillary refill takes less than 2 seconds.   Neurological:      General: No focal deficit present.      Mental Status: He is alert and oriented  to person, place, and time. Mental status is at baseline.      Comments: Patient is alert, dysarthric with unintelligible speech. Right facial weakness. Faint movement to RUE.          Review of Systems    Vents:  Oxygen Concentration (%): 28 (05/13/23 1938)    Lines/Drains/Airways       Drain  Duration                  Hemodialysis AV Fistula 03/20/23 Right upper arm 55 days              Peripheral Intravenous Line  Duration                  Peripheral IV - Single Lumen 05/12/23 18 G Left;Posterior Hand 2 days                    Significant Labs:    CBC/Anemia Profile:  Recent Labs   Lab 05/12/23 2000 05/13/23 0527 05/14/23  0658   WBC 4.11* 2.88* 4.61   HGB 7.8* 7.5* 7.5*   HCT 24.6* 22.8* 24.2*   * 100* 112*   MCV 99.2* 98.3* 102.1*   RDW 12.0 11.9 12.2   IRON 33*  --   --    FERRITIN 426*  --   --    FOLATE 7.6  --   --    DEACBASY64 913  --   --         Chemistries:  Recent Labs   Lab 05/12/23 2000 05/13/23 0527 05/14/23  0658   * 136 138   K 3.4* 3.2* 3.5   CL 95* 96* 101   CO2 32 31 30   BUN 15 18 17   CREATININE 4.51* 4.71* 4.09*   CALCIUM 8.6 8.6 8.7   ALBUMIN 2.9* 2.6* 2.6*   PROT 6.9  --   --    BILITOT 0.6  --   --    ALKPHOS 63  --   --    ALT 13*  --   --    AST 11*  --   --    MG 2.0  --   --    PHOS  --  4.1 3.8       All pertinent labs within the past 24 hours have been reviewed.    Significant Imaging:  I have reviewed all pertinent imaging results/findings within the past 24 hours.

## 2023-05-14 NOTE — PROGRESS NOTES
[facility]  Nephrology  Progress Note    Patient Name: Pardeep Collins  MRN: [id number]  Admission Date: 5/12/2023  Hospital Length of Stay: 0 days  Attending Provider: Dk Syed Jr., MD   Primary Care Physician: Primary Doctor No  Principal Problem:Episode of transient neurologic symptoms    Subjective:     HPI: This gentleman with history of HTN, anemia, gout who recently started dialysis within the past month.  The patient presented with dysarthria and weakness that started on yesterday.  He had further work up with CT head showed no acute stroke and has been admitted for TIA. CTA showed no evidence of large vessel occlusion. He is awake and alert. He is dysarthric. He denies any shortness or chest pain. Nephrology is consulted for renal issues.      Interval History: The patient had worsening neurologic symptoms on yesterday and last night.  He is now s/p TPA.  He is now in the ICU setting.  He is awake and alert.  He is more dysarthric.    Review of patient's allergies indicates:  No Known Allergies  Current Facility-Administered Medications   Medication Frequency    0.9%  NaCl infusion PRN    acetaminophen tablet 650 mg Q4H PRN    atorvastatin tablet 80 mg QHS    HYDROcodone-acetaminophen 5-325 mg per tablet 1 tablet Q6H PRN    labetaloL injection 10 mg Q4H PRN    naloxone 0.4 mg/mL injection 0.02 mg PRN    ondansetron injection 4 mg Q8H PRN    sodium chloride 0.9% flush 10 mL Q12H PRN       Objective:     Vital Signs (Most Recent):  Temp: 98.3 °F (36.8 °C) (05/14/23 0705)  Pulse: (!) 48 (05/14/23 0815)  Resp: 14 (05/14/23 0815)  BP: (!) 173/87 (05/14/23 0800)  SpO2: 100 % (05/14/23 0815) Vital Signs (24h Range):  Temp:  [97.3 °F (36.3 °C)-99.1 °F (37.3 °C)] 98.3 °F (36.8 °C)  Pulse:  [] 48  Resp:  [9-21] 14  SpO2:  [97 %-100 %] 100 %  BP: (113-200)/() 173/87     Weight: 56.7 kg (125 lb) (05/13/23 0703)  Body mass index is 19.01 kg/m².  Body surface area is 1.65  meters squared.    I/O last 3 completed shifts:  In: 0   Out: 475 [Urine:475]     Physical Exam  Vitals reviewed.   HENT:      Head: Normocephalic and atraumatic.   Eyes:      Pupils: Pupils are equal, round, and reactive to light.   Cardiovascular:      Rate and Rhythm: Regular rhythm.   Pulmonary:      Effort: Pulmonary effort is normal.   Abdominal:      Palpations: Abdomen is soft.   Neurological:      Mental Status: He is alert.   Psychiatric:         Mood and Affect: Mood normal.        Significant Labs:  BMP:   Recent Labs   Lab 05/12/23 2000 05/13/23  0527 05/14/23  0658   *   < > 96   *   < > 138   K 3.4*   < > 3.5   CL 95*   < > 101   CO2 32   < > 30   BUN 15   < > 17   CREATININE 4.51*   < > 4.09*   CALCIUM 8.6   < > 8.7   MG 2.0  --   --     < > = values in this interval not displayed.     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    Renal/  ESRD (end stage renal disease)  New to dialysis.  Volume status is stable.        Thank you for your consult. I will follow-up with patient. Please contact us if you have any additional questions.    Chandler Aceves Jr, MD  Nephrology  [facility]

## 2023-05-14 NOTE — SUBJECTIVE & OBJECTIVE
Interval History: The patient had worsening neurologic symptoms on yesterday and last night.  He is now s/p TPA.  He is now in the ICU setting.  He is awake and alert.  He is more dysarthric.    Review of patient's allergies indicates:  No Known Allergies  Current Facility-Administered Medications   Medication Frequency    0.9%  NaCl infusion PRN    acetaminophen tablet 650 mg Q4H PRN    atorvastatin tablet 80 mg QHS    HYDROcodone-acetaminophen 5-325 mg per tablet 1 tablet Q6H PRN    labetaloL injection 10 mg Q4H PRN    naloxone 0.4 mg/mL injection 0.02 mg PRN    ondansetron injection 4 mg Q8H PRN    sodium chloride 0.9% flush 10 mL Q12H PRN       Objective:     Vital Signs (Most Recent):  Temp: 98.3 °F (36.8 °C) (05/14/23 0705)  Pulse: (!) 48 (05/14/23 0815)  Resp: 14 (05/14/23 0815)  BP: (!) 173/87 (05/14/23 0800)  SpO2: 100 % (05/14/23 0815) Vital Signs (24h Range):  Temp:  [97.3 °F (36.3 °C)-99.1 °F (37.3 °C)] 98.3 °F (36.8 °C)  Pulse:  [] 48  Resp:  [9-21] 14  SpO2:  [97 %-100 %] 100 %  BP: (113-200)/() 173/87     Weight: 56.7 kg (125 lb) (05/13/23 0703)  Body mass index is 19.01 kg/m².  Body surface area is 1.65 meters squared.    I/O last 3 completed shifts:  In: 0   Out: 475 [Urine:475]     Physical Exam  Vitals reviewed.   HENT:      Head: Normocephalic and atraumatic.   Eyes:      Pupils: Pupils are equal, round, and reactive to light.   Cardiovascular:      Rate and Rhythm: Regular rhythm.   Pulmonary:      Effort: Pulmonary effort is normal.   Abdominal:      Palpations: Abdomen is soft.   Neurological:      Mental Status: He is alert.   Psychiatric:         Mood and Affect: Mood normal.        Significant Labs:  BMP:   Recent Labs   Lab 05/12/23 2000 05/13/23 0527 05/14/23  0658   *   < > 96   *   < > 138   K 3.4*   < > 3.5   CL 95*   < > 101   CO2 32   < > 30   BUN 15   < > 17   CREATININE 4.51*   < > 4.09*   CALCIUM 8.6   < > 8.7   MG 2.0  --   --     < > = values in this  interval not displayed.     CBC:   Recent Labs   Lab 05/14/23  0658   WBC 4.61   RBC 2.37*   HGB 7.5*   HCT 24.2*   *   .1*   MCH 31.6*   MCHC 31.0*        Significant Imaging:  Labs: Reviewed

## 2023-05-14 NOTE — PT/OT/SLP PROGRESS
Physical Therapy      Patient Name:  Pardeep Collins   MRN:  [id number]    Patient not seen today secondary to Nurse/ LINDA hold (pt had issues with bleeding out through dialysis access this AM (requiring pressure to be applied for 1.5 hr), medication to control bleed not reached full benefit at this time.). Will follow-up 5/15/23.

## 2023-05-14 NOTE — PLAN OF CARE
Problem: Adult Inpatient Plan of Care  Goal: Plan of Care Review  Outcome: Ongoing, Progressing  Goal: Patient-Specific Goal (Individualized)  Outcome: Ongoing, Progressing  Goal: Absence of Hospital-Acquired Illness or Injury  Outcome: Ongoing, Progressing  Goal: Optimal Comfort and Wellbeing  Outcome: Ongoing, Progressing  Goal: Readiness for Transition of Care  Outcome: Ongoing, Progressing     Problem: Fall Injury Risk  Goal: Absence of Fall and Fall-Related Injury  Outcome: Ongoing, Progressing     Problem: Skin Injury Risk Increased  Goal: Skin Health and Integrity  Outcome: Ongoing, Progressing     Problem: Device-Related Complication Risk (Hemodialysis)  Goal: Safe, Effective Therapy Delivery  Outcome: Ongoing, Progressing     Problem: Hemodynamic Instability (Hemodialysis)  Goal: Effective Tissue Perfusion  Outcome: Ongoing, Progressing     Problem: Infection (Hemodialysis)  Goal: Absence of Infection Signs and Symptoms  Outcome: Ongoing, Progressing

## 2023-05-14 NOTE — PLAN OF CARE
Problem: Adult Inpatient Plan of Care  Goal: Plan of Care Review  Outcome: Ongoing, Progressing  Goal: Patient-Specific Goal (Individualized)  Outcome: Ongoing, Progressing  Goal: Absence of Hospital-Acquired Illness or Injury  Outcome: Ongoing, Progressing  Goal: Optimal Comfort and Wellbeing  Outcome: Ongoing, Progressing  Intervention: Monitor Pain and Promote Comfort  Flowsheets (Taken 5/14/2023 1119)  Pain Management Interventions:   relaxation techniques promoted   quiet environment facilitated   pillow support provided   position adjusted  Intervention: Provide Person-Centered Care  Flowsheets (Taken 5/14/2023 1119)  Trust Relationship/Rapport:   care explained   reassurance provided   choices provided   thoughts/feelings acknowledged   emotional support provided   empathic listening provided   questions answered   questions encouraged  Goal: Readiness for Transition of Care  Outcome: Ongoing, Progressing     Problem: Fall Injury Risk  Goal: Absence of Fall and Fall-Related Injury  Outcome: Ongoing, Progressing     Problem: Skin Injury Risk Increased  Goal: Skin Health and Integrity  Outcome: Ongoing, Progressing     Problem: Device-Related Complication Risk (Hemodialysis)  Goal: Safe, Effective Therapy Delivery  Outcome: Ongoing, Progressing     Problem: Hemodynamic Instability (Hemodialysis)  Goal: Effective Tissue Perfusion  Outcome: Ongoing, Progressing     Problem: Infection (Hemodialysis)  Goal: Absence of Infection Signs and Symptoms  Outcome: Ongoing, Progressing     Problem: Adjustment to Illness (Stroke, Ischemic/Transient Ischemic Attack)  Goal: Optimal Coping  Outcome: Ongoing, Progressing  Intervention: Support Psychosocial Response to Stroke  Flowsheets (Taken 5/14/2023 1119)  Supportive Measures:   active listening utilized   counseling provided   relaxation techniques promoted   self-care encouraged  Family/Support System Care: support provided     Problem: Bowel Elimination Impaired  (Stroke, Ischemic/Transient Ischemic Attack)  Goal: Effective Bowel Elimination  Outcome: Ongoing, Progressing     Problem: Cerebral Tissue Perfusion (Stroke, Ischemic/Transient Ischemic Attack)  Goal: Optimal Cerebral Tissue Perfusion  Outcome: Ongoing, Progressing  Intervention: Protect and Optimize Cerebral Perfusion  Flowsheets (Taken 5/14/2023 1119)  Sensory Stimulation Regulation:   care clustered   lighting decreased  Cerebral Perfusion Promotion: blood pressure monitored     Problem: Cognitive Impairment (Stroke, Ischemic/Transient Ischemic Attack)  Goal: Optimal Cognitive Function  Outcome: Ongoing, Progressing  Intervention: Optimize Cognitive Function  Flowsheets (Taken 5/14/2023 1119)  Sensory Stimulation Regulation:   care clustered   lighting decreased     Problem: Communication Impairment (Stroke, Ischemic/Transient Ischemic Attack)  Goal: Improved Communication Skills  Outcome: Ongoing, Progressing  Intervention: Optimize Communication Skills  Flowsheets (Taken 5/14/2023 1119)  Communication Enhancement Strategies:   extra time allowed for response   repetition utilized     Problem: Functional Ability Impaired (Stroke, Ischemic/Transient Ischemic Attack)  Goal: Optimal Functional Ability  Outcome: Ongoing, Progressing  Intervention: Optimize Functional Ability  Flowsheets (Taken 5/14/2023 1119)  Self-Care Promotion: independence encouraged     Problem: Respiratory Compromise (Stroke, Ischemic/Transient Ischemic Attack)  Goal: Effective Oxygenation and Ventilation  Outcome: Ongoing, Progressing  Intervention: Optimize Oxygenation and Ventilation  Flowsheets (Taken 5/14/2023 1119)  Airway/Ventilation Management: airway patency maintained     Problem: Sensorimotor Impairment (Stroke, Ischemic/Transient Ischemic Attack)  Goal: Improved Sensorimotor Function  Outcome: Ongoing, Progressing     Problem: Swallowing Impairment (Stroke, Ischemic/Transient Ischemic Attack)  Goal: Optimal Eating and Swallowing  without Aspiration  Outcome: Ongoing, Progressing     Problem: Urinary Elimination Impaired (Stroke, Ischemic/Transient Ischemic Attack)  Goal: Effective Urinary Elimination  Outcome: Ongoing, Progressing

## 2023-05-14 NOTE — SUBJECTIVE & OBJECTIVE
Interval History: Admitted with TIA type episodes x 2 last night. R sided weakness and slurred speech. Has had CTA today and does have relatively high grade distal basilar stenosis but that would not explain symptoms.  Has been loaded with plavix and started on statin.    Review of Systems  Objective:     Vital Signs (Most Recent):  Temp: 98.8 °F (37.1 °C) (05/13/23 1735)  Pulse: 71 (05/13/23 1900)  Resp: 16 (05/13/23 1900)  BP: 113/60 (05/13/23 1900)  SpO2: 100 % (05/13/23 1616) Vital Signs (24h Range):  Temp:  [97.3 °F (36.3 °C)-99.1 °F (37.3 °C)] 98.8 °F (37.1 °C)  Pulse:  [52-90] 71  Resp:  [12-18] 16  SpO2:  [98 %-100 %] 100 %  BP: (113-188)/(60-94) 113/60     Weight: 56.7 kg (125 lb)  Body mass index is 19.01 kg/m².    Intake/Output Summary (Last 24 hours) at 5/13/2023 2010  Last data filed at 5/13/2023 1715  Gross per 24 hour   Intake 0 ml   Output 200 ml   Net -200 ml         Physical Exam  Vitals reviewed.   Constitutional:       General: He is not in acute distress.     Appearance: Normal appearance.   HENT:      Head: Normocephalic and atraumatic.      Right Ear: External ear normal.      Left Ear: External ear normal.   Eyes:      Extraocular Movements: Extraocular movements intact.      Pupils: Pupils are equal, round, and reactive to light.   Cardiovascular:      Rate and Rhythm: Normal rate and regular rhythm.      Pulses: Normal pulses.      Heart sounds: Normal heart sounds. No murmur heard.  Pulmonary:      Effort: Pulmonary effort is normal. No respiratory distress.      Breath sounds: Normal breath sounds. No wheezing.   Chest:      Chest wall: No tenderness.   Abdominal:      General: Abdomen is flat.      Palpations: Abdomen is soft. There is no mass.      Tenderness: There is no abdominal tenderness. There is no right CVA tenderness or left CVA tenderness.   Musculoskeletal:         General: No swelling or tenderness. Normal range of motion.   Skin:     General: Skin is warm and dry.       Capillary Refill: Capillary refill takes less than 2 seconds.   Neurological:      General: No focal deficit present.      Mental Status: He is alert and oriented to person, place, and time. Mental status is at baseline.   Psychiatric:         Mood and Affect: Mood normal.         Thought Content: Thought content normal.           Significant Labs: All pertinent labs within the past 24 hours have been reviewed.  BMP:   Recent Labs   Lab 05/12/23 2000 05/13/23 0527   * 76   * 136   K 3.4* 3.2*   CL 95* 96*   CO2 32 31   BUN 15 18   CREATININE 4.51* 4.71*   CALCIUM 8.6 8.6   MG 2.0  --      CBC:   Recent Labs   Lab 05/12/23 2000 05/13/23 0527   WBC 4.11* 2.88*   HGB 7.8* 7.5*   HCT 24.6* 22.8*   * 100*       Significant Imaging: I have reviewed all pertinent imaging results/findings within the past 24 hours.

## 2023-05-14 NOTE — HOSPITAL COURSE
5/14- acute mental status change overnight; tele neuro consult with recommendations of TNK and CTA head and neck; neg scans; had issues with bleeding from av fistula after TNK- TXA infused gauze bandage placed with adequate bleeding control; PT/OT/ST to see

## 2023-05-14 NOTE — PT/OT/SLP EVAL
Speech Language Pathology Evaluation  Bedside Swallow    Patient Name:  Pardeep Collins   MRN:  [id number]  Admitting Diagnosis: Episode of transient neurologic symptoms    Recommendations:                 General Recommendations:  Speech/language therapy  Diet recommendations:  Dental Soft, Thin   Aspiration Precautions: 1 bite/sip at a time, Alternating bites/sips, Check for pocketing/oral residue, Feed only when awake/alert, HOB to 90 degrees, Remain upright 30 minutes post meal, Small bites/sips, and Standard aspiration precautions   General Precautions: Standard,    Communication strategies:   Patient has severe dysarthria    Assessment:     Pardeep Collins is a 82 y.o. male with an SLP diagnosis of  possible TIA .  He presents with passing a BEDSIDE SWALLOW EVALUATION with soft foods and thin liquids but noted to have severe dysarthria.    History:     Past Medical History:   Diagnosis Date    Dialysis patient     Elevated PSA     Gout, unspecified     Hypertension     Renal disorder     Rheumatoid arthritis, unspecified        Past Surgical History:   Procedure Laterality Date    AV FISTULA PLACEMENT Right 3/20/2023    Procedure: CREATION, AV FISTULA;  Surgeon: Alfred Sierra MD;  Location: [facility];  Service: General;  Laterality: Right;  right basilic vein transposition    HAND SURGERY Left     urolift      in [place];       Social History: Patient lives with not stated.    Prior Intubation HX:  n/a    Modified Barium Swallow: n/a    Chest X-Rays: see chart    Prior diet: regular consistency.    Occupation/hobbies/homemaking: not stated.    Subjective     Patient lying in bed asleep but easily aroused. Patient agreeable to BEDSIDE SWALLOW EVALUATION.  Patient goals: not stated     Pain/Comfort:  Pain Rating 1: 0/10    Respiratory Status: Room air    Objective:     Oral Musculature Evaluation  Oral Musculature: general weakness  Dentition: lower dentures  Secretion Management: adequate  Mucosal Quality:  adequate  Oral Labial Strength and Mobility:  (Oral motor skills appear to be weakened in strength, ROM, and coordination on pt's right side.)    Bedside Swallow Eval:   Consistencies Assessed:  Thin liquids No difficulties noted with small trials of water via a spoon or a straw.   Puree No difficulties noted with small bites of pudding. Patient did report that he has bottom dentures that he uses when he eats.       Oral Phase:   WFL    Pharyngeal Phase:   no overt clinical signs/symptoms of aspiration  no overt clinical signs/symptoms of pharyngeal dysphagia    Compensatory Strategies  None    Treatment: Rec a dental soft diet with thin liquids as tolerated. SPEECH THERAPY to begin therapy for dysarthria and oral motor exercises.     Goals:   Multidisciplinary Problems       SLP Goals       Not on file                Patient will complete lingual protrusions, elevations, depressions, and lats with mod-max accuracy Independently.   Patient will complete labial protrusions and retractions with mod-max accuracy Independently.   Patient will utilize dysarthria techniques with 80% accuracy Independently.      Plan:     Patient to be seen:  5 x/week   Plan of Care expires:  05/28/23  Plan of Care reviewed with:      SLP Follow-Up:  Yes       Discharge recommendations:      Barriers to Discharge:  Decreased Care Giver Support    Time Tracking:     SLP Treatment Date:      Speech Start Time:  1100  Speech Stop Time:  1120     Speech Total Time (min):  20 min    Billable Minutes: Eval Swallow and Oral Function 20 05/14/2023

## 2023-05-14 NOTE — PROGRESS NOTES
[facility] - [facility] Tele[facility] Hospital Medicine  Progress Note    Patient Name: Pardeep Collins  MRN: [id number]  Patient Class: OP- Observation   Admission Date: 5/12/2023  Length of Stay: 0 days  Attending Physician: Dk Syed Jr., MD  Primary Care Provider: Primary Doctor No        Subjective:     Principal Problem:Episode of transient neurologic symptoms        HPI:  Patient is an 82-year-old male with a history of essential hypertension and ESRD on HD on TTS with associated anemia who presents to the emergency room with transient episodes of slurred speech and right-sided weakness.  Patient stated that he was in his usual state of health until the today around 11:00 a.m. when he suddenly experienced slurred speech and right-sided weakness.  He stated that these symptoms lasted about 5 minute with complete resolution and did not think much of it when he experienced same symptoms at around 2:00 p.m. which lasted about 10 minutes again with complete resolution without any intervention.  Patient was subsequently brought in by EMS for evaluation.    On presentation, vital signs were stable and patient was afebrile.  Physical examination revealed NIHSS of 0 points.  Ensuing workup including CT of head was unremarkable.  Tele neurologist was consulted who recommended CTA of the head and neck and provided that there is no evidence of LVO, patient needs be started on DAPT with loading dose of Plavix and high-intensity statin.  Patient will be admitted for further evaluation and intervention.      Overview/Hospital Course:  No notes on file    Interval History: Admitted with TIA type episodes x 2 last night. R sided weakness and slurred speech. Has had CTA today and does have relatively high grade distal basilar stenosis but that would not explain symptoms.  Has been loaded with plavix and started on statin.    Review of Systems  Objective:     Vital Signs (Most Recent):  Temp: 98.8 °F (37.1 °C)  (05/13/23 1735)  Pulse: 71 (05/13/23 1900)  Resp: 16 (05/13/23 1900)  BP: 113/60 (05/13/23 1900)  SpO2: 100 % (05/13/23 1616) Vital Signs (24h Range):  Temp:  [97.3 °F (36.3 °C)-99.1 °F (37.3 °C)] 98.8 °F (37.1 °C)  Pulse:  [52-90] 71  Resp:  [12-18] 16  SpO2:  [98 %-100 %] 100 %  BP: (113-188)/(60-94) 113/60     Weight: 56.7 kg (125 lb)  Body mass index is 19.01 kg/m².    Intake/Output Summary (Last 24 hours) at 5/13/2023 2010  Last data filed at 5/13/2023 1715  Gross per 24 hour   Intake 0 ml   Output 200 ml   Net -200 ml         Physical Exam  Vitals reviewed.   Constitutional:       General: He is not in acute distress.     Appearance: Normal appearance.   HENT:      Head: Normocephalic and atraumatic.      Right Ear: External ear normal.      Left Ear: External ear normal.   Eyes:      Extraocular Movements: Extraocular movements intact.      Pupils: Pupils are equal, round, and reactive to light.   Cardiovascular:      Rate and Rhythm: Normal rate and regular rhythm.      Pulses: Normal pulses.      Heart sounds: Normal heart sounds. No murmur heard.  Pulmonary:      Effort: Pulmonary effort is normal. No respiratory distress.      Breath sounds: Normal breath sounds. No wheezing.   Chest:      Chest wall: No tenderness.   Abdominal:      General: Abdomen is flat.      Palpations: Abdomen is soft. There is no mass.      Tenderness: There is no abdominal tenderness. There is no right CVA tenderness or left CVA tenderness.   Musculoskeletal:         General: No swelling or tenderness. Normal range of motion.   Skin:     General: Skin is warm and dry.      Capillary Refill: Capillary refill takes less than 2 seconds.   Neurological:      General: No focal deficit present.      Mental Status: He is alert and oriented to person, place, and time. Mental status is at baseline.   Psychiatric:         Mood and Affect: Mood normal.         Thought Content: Thought content normal.           Significant Labs: All  pertinent labs within the past 24 hours have been reviewed.  BMP:   Recent Labs   Lab 05/12/23 2000 05/13/23 0527   * 76   * 136   K 3.4* 3.2*   CL 95* 96*   CO2 32 31   BUN 15 18   CREATININE 4.51* 4.71*   CALCIUM 8.6 8.6   MG 2.0  --      CBC:   Recent Labs   Lab 05/12/23 2000 05/13/23 0527   WBC 4.11* 2.88*   HGB 7.8* 7.5*   HCT 24.6* 22.8*   * 100*       Significant Imaging: I have reviewed all pertinent imaging results/findings within the past 24 hours.      Assessment/Plan:      * Episode of transient neurologic symptoms    Patient 2 episodes of transient neurological deficits including symptoms of dysarthria and right-sided weakness.  When patient arrived in ED patient had NIHSS of 0.  Initial workup including CT of the head was unremarkable.  Tele neurologist recommended CTA of head and neck and provided that there is no evidence of LVO, patient will be started on DAPT with loading dose of Plavix and a high-intensity statin.  Will also proceed with MRI of brain and echocardiogram      Anemia    Likely multifactorial.  Patient's hemoglobin stable from his baseline      Primary hypertension    Will hold off on antihypertensive to allow for permissive hypertension for the next 24 hours      ESRD (end stage renal disease)    Patient is under the care of Dr. Aceves and dialyzes on Tuesdays Thursdays and Saturday.  Will consult Dr. Aceves        VTE Risk Mitigation (From admission, onward)         Ordered     enoxaparin injection 40 mg  Daily         05/13/23 0041     IP VTE HIGH RISK PATIENT  Once         05/13/23 0041     Place sequential compression device  Until discontinued         05/13/23 0041                Discharge Planning   RASHAD:      Code Status: Full Code   Is the patient medically ready for discharge?:     Reason for patient still in hospital (select all that apply): Treatment  Discharge Plan A: Home, Home Health                  Dk Syed Jr, MD  Department of Hospital  Medicine   [facility] - [address] [place] [address]

## 2023-05-14 NOTE — HPI
83 y/o male with HTN, ESRD, hemodialysis, anemia, RA presented with transient episode of slurred speech and right sided weakness.  Initial event started around 11am on 5/12.  The symptoms lasted about 5 minutes then resolved.  He had another event around 2pm lasting about 10 minutes then completely resolve.  Patient was seen by TeleStroke team with an NIH of 0.  Patient admitted for stroke workup.  Saturday night into Sunday morning, patient had recurrence of symptoms while receiving dialysis.  A stroke code was activated and patient was seen by TeleStroke team again with NIH of 13.  Thrombolytic therapy was recommended, along with repeat CTA to rule out LVO.  No LVO noted on repeat imaging.  Nurse states patient did have some improvement in symptoms after TNK yesterday and was able to move his arms and legs, but today he can no longer move his right side.  Patient unable to provide history due to severe dysarthria, but is able to nod head appropriately to questions.

## 2023-05-14 NOTE — PLAN OF CARE
Problem: SLP  Goal: SLP Goal  Description: Patient will complete lingual protrusions, elevations, depressions, and lats with mod-max accuracy Independently.   Patient will complete labial protrusions and retractions with mod-max accuracy Independently.   Patient will utilize dysarthria techniques with 80% accuracy Independently.      Outcome: Ongoing, Progressing

## 2023-05-15 PROBLEM — G81.01 FLACCID HEMIPLEGIA AFFECTING RIGHT DOMINANT SIDE: Status: ACTIVE | Noted: 2023-05-15

## 2023-05-15 PROBLEM — R13.10 DYSPHAGIA: Status: ACTIVE | Noted: 2023-05-15

## 2023-05-15 PROBLEM — R47.1 DYSARTHRIA AND ANARTHRIA: Status: ACTIVE | Noted: 2023-05-15

## 2023-05-15 PROBLEM — I63.032: Status: ACTIVE | Noted: 2023-05-14

## 2023-05-15 LAB
ANION GAP SERPL CALCULATED.3IONS-SCNC: 13 MMOL/L (ref 7–16)
BASOPHILS # BLD AUTO: 0.02 K/UL (ref 0–0.2)
BASOPHILS NFR BLD AUTO: 0.4 % (ref 0–1)
BUN SERPL-MCNC: 21 MG/DL (ref 7–18)
BUN/CREAT SERPL: 4 (ref 6–20)
CALCIUM SERPL-MCNC: 8.7 MG/DL (ref 8.5–10.1)
CHLORIDE SERPL-SCNC: 102 MMOL/L (ref 98–107)
CO2 SERPL-SCNC: 29 MMOL/L (ref 21–32)
CREAT SERPL-MCNC: 5.41 MG/DL (ref 0.7–1.3)
DIFFERENTIAL METHOD BLD: ABNORMAL
EGFR (NO RACE VARIABLE) (RUSH/TITUS): 10 ML/MIN/1.73M2
EOSINOPHIL # BLD AUTO: 0.2 K/UL (ref 0–0.5)
EOSINOPHIL NFR BLD AUTO: 4.3 % (ref 1–4)
ERYTHROCYTE [DISTWIDTH] IN BLOOD BY AUTOMATED COUNT: 12.5 % (ref 11.5–14.5)
GLUCOSE SERPL-MCNC: 96 MG/DL (ref 74–106)
HCT VFR BLD AUTO: 24.6 % (ref 40–54)
HGB BLD-MCNC: 7.7 G/DL (ref 13.5–18)
IMM GRANULOCYTES # BLD AUTO: 0.02 K/UL (ref 0–0.04)
IMM GRANULOCYTES NFR BLD: 0.4 % (ref 0–0.4)
LYMPHOCYTES # BLD AUTO: 1.25 K/UL (ref 1–4.8)
LYMPHOCYTES NFR BLD AUTO: 27.2 % (ref 27–41)
MCH RBC QN AUTO: 32.2 PG (ref 27–31)
MCHC RBC AUTO-ENTMCNC: 31.3 G/DL (ref 32–36)
MCV RBC AUTO: 102.9 FL (ref 80–96)
MONOCYTES # BLD AUTO: 0.48 K/UL (ref 0–0.8)
MONOCYTES NFR BLD AUTO: 10.4 % (ref 2–6)
MPC BLD CALC-MCNC: 13.6 FL (ref 9.4–12.4)
NEUTROPHILS # BLD AUTO: 2.63 K/UL (ref 1.8–7.7)
NEUTROPHILS NFR BLD AUTO: 57.3 % (ref 53–65)
NRBC # BLD AUTO: 0 X10E3/UL
NRBC, AUTO (.00): 0 %
PLATELET # BLD AUTO: 115 K/UL (ref 150–400)
POTASSIUM SERPL-SCNC: 3.7 MMOL/L (ref 3.5–5.1)
RBC # BLD AUTO: 2.39 M/UL (ref 4.6–6.2)
SODIUM SERPL-SCNC: 140 MMOL/L (ref 136–145)
WBC # BLD AUTO: 4.6 K/UL (ref 4.5–11)

## 2023-05-15 PROCEDURE — 94761 N-INVAS EAR/PLS OXIMETRY MLT: CPT

## 2023-05-15 PROCEDURE — 25000003 PHARM REV CODE 250: Performed by: NURSE PRACTITIONER

## 2023-05-15 PROCEDURE — G0427 INPT/ED TELECONSULT70: HCPCS | Mod: GT,,, | Performed by: NURSE PRACTITIONER

## 2023-05-15 PROCEDURE — 80048 BASIC METABOLIC PNL TOTAL CA: CPT | Performed by: NURSE PRACTITIONER

## 2023-05-15 PROCEDURE — 92507 TX SP LANG VOICE COMM INDIV: CPT

## 2023-05-15 PROCEDURE — 99233 PR SUBSEQUENT HOSPITAL CARE,LEVL III: ICD-10-PCS | Mod: ,,, | Performed by: INTERNAL MEDICINE

## 2023-05-15 PROCEDURE — 99233 SBSQ HOSP IP/OBS HIGH 50: CPT | Mod: ,,, | Performed by: INTERNAL MEDICINE

## 2023-05-15 PROCEDURE — 25000003 PHARM REV CODE 250: Performed by: INTERNAL MEDICINE

## 2023-05-15 PROCEDURE — 85025 COMPLETE CBC W/AUTO DIFF WBC: CPT | Performed by: NURSE PRACTITIONER

## 2023-05-15 PROCEDURE — 97166 OT EVAL MOD COMPLEX 45 MIN: CPT

## 2023-05-15 PROCEDURE — 11000001 HC ACUTE MED/SURG PRIVATE ROOM

## 2023-05-15 PROCEDURE — G0427 PR INPT TELEHEALTH CON 70/>M: ICD-10-PCS | Mod: GT,,, | Performed by: NURSE PRACTITIONER

## 2023-05-15 PROCEDURE — 97162 PT EVAL MOD COMPLEX 30 MIN: CPT

## 2023-05-15 RX ADMIN — ATORVASTATIN CALCIUM 80 MG: 80 TABLET, FILM COATED ORAL at 09:05

## 2023-05-15 RX ADMIN — MUPIROCIN: 20 OINTMENT TOPICAL at 09:05

## 2023-05-15 RX ADMIN — POLYETHYLENE GLYCOL 3350 17 G: 17 POWDER, FOR SOLUTION ORAL at 08:05

## 2023-05-15 RX ADMIN — MUPIROCIN: 20 OINTMENT TOPICAL at 08:05

## 2023-05-15 NOTE — SUBJECTIVE & OBJECTIVE
Interval History:  Patient with right-sided weakness      Objective:     Vital Signs (Most Recent):  Temp: 97.6 °F (36.4 °C) (05/15/23 0301)  Pulse: 72 (05/15/23 0515)  Resp: 11 (05/15/23 0515)  BP: (!) 150/74 (05/15/23 0515)  SpO2: 100 % (05/15/23 0515) Vital Signs (24h Range):  Temp:  [97.6 °F (36.4 °C)-98.3 °F (36.8 °C)] 97.6 °F (36.4 °C)  Pulse:  [40-97] 72  Resp:  [9-41] 11  SpO2:  [69 %-100 %] 100 %  BP: ()/() 150/74     Weight: 56.9 kg (125 lb 7.1 oz)  Body mass index is 19.07 kg/m².      Intake/Output Summary (Last 24 hours) at 5/15/2023 0556  Last data filed at 5/14/2023 2301  Gross per 24 hour   Intake --   Output 175 ml   Net -175 ml        Physical Exam  Vitals reviewed.   Constitutional:       Appearance: Normal appearance.      Interventions: He is not intubated.  HENT:      Head: Normocephalic and atraumatic.      Nose: Nose normal.      Mouth/Throat:      Mouth: Mucous membranes are dry.      Pharynx: Oropharynx is clear.   Eyes:      Extraocular Movements: Extraocular movements intact.      Conjunctiva/sclera: Conjunctivae normal.      Pupils: Pupils are equal, round, and reactive to light.   Cardiovascular:      Rate and Rhythm: Normal rate.      Heart sounds: Normal heart sounds. No murmur heard.  Pulmonary:      Effort: Pulmonary effort is normal. He is not intubated.      Breath sounds: Normal breath sounds.   Abdominal:      General: Abdomen is flat. Bowel sounds are normal.      Palpations: Abdomen is soft.   Musculoskeletal:         General: Normal range of motion.      Cervical back: Normal range of motion and neck supple.      Right lower leg: No edema.      Left lower leg: No edema.   Skin:     General: Skin is warm and dry.      Capillary Refill: Capillary refill takes less than 2 seconds.   Neurological:      General: No focal deficit present.      Mental Status: He is alert and oriented to person, place, and time.   Psychiatric:         Mood and Affect: Mood normal.          Behavior: Behavior normal.         Review of Systems    Vents:  Oxygen Concentration (%): 28 (05/13/23 1938)    Lines/Drains/Airways       Drain  Duration                  Hemodialysis AV Fistula 03/20/23 Right upper arm 56 days              Peripheral Intravenous Line  Duration                  Peripheral IV - Single Lumen 05/12/23 18 G Left;Posterior Hand 3 days                    Significant Labs:    CBC/Anemia Profile:  Recent Labs   Lab 05/14/23  0658 05/15/23  0438   WBC 4.61 4.60   HGB 7.5* 7.7*   HCT 24.2* 24.6*   * 115*   .1* 102.9*   RDW 12.2 12.5        Chemistries:  Recent Labs   Lab 05/14/23  0658 05/15/23  0438    140   K 3.5 3.7    102   CO2 30 29   BUN 17 21*   CREATININE 4.09* 5.41*   CALCIUM 8.7 8.7   ALBUMIN 2.6*  --    PHOS 3.8  --        Recent Lab Results         05/15/23  0438   05/14/23 0658        Albumin   2.6       Anion Gap 13   11       Baso # 0.02   0.01       Basophil % 0.4   0.2       BUN 21   17       BUN/CREAT RATIO 4   4       Calcium 8.7   8.7       Chloride 102   101       CO2 29   30       Creatinine 5.41   4.09       Differential Type Auto   Auto       eGFR 10   14       Eos # 0.20   0.15       Eosinophil % 4.3   3.3       Glucose 96   96       Hematocrit 24.6   24.2       Hemoglobin 7.7   7.5       Immature Grans (Abs) 0.02   0.02       Immature Granulocytes 0.4   0.4       Lymph # 1.25   1.48       Lymph % 27.2   32.1       Macrocytosis   1+       MCH 32.2   31.6       MCHC 31.3   31.0       .9   102.1       Mono # 0.48   0.37       Mono % 10.4   8.0       MPV 13.6   13.5       Neutrophils, Abs 2.63   2.58       Neutrophils Relative 57.3   56.0       nRBC 0.0   0.0       NUCLEATED RBC ABSOLUTE 0.00   0.00       Ovalocytes   Few       Phosphorus   3.8       PLATELET MORPHOLOGY   Decreased  Comment: Large & Giant Platelets       Platelets 115   112       Potassium 3.7   3.5       RBC 2.39   2.37       RDW 12.5   12.2       Sodium 140   138        WBC 4.60   4.61               Significant Imaging:  I have reviewed all pertinent imaging results/findings within the past 24 hours.

## 2023-05-15 NOTE — PLAN OF CARE
Problem: Adult Inpatient Plan of Care  Goal: Plan of Care Review  Outcome: Ongoing, Progressing  Goal: Patient-Specific Goal (Individualized)  Outcome: Ongoing, Progressing  Goal: Absence of Hospital-Acquired Illness or Injury  Outcome: Ongoing, Progressing  Goal: Optimal Comfort and Wellbeing  Outcome: Ongoing, Progressing  Goal: Readiness for Transition of Care  Outcome: Ongoing, Progressing     Problem: Fall Injury Risk  Goal: Absence of Fall and Fall-Related Injury  Outcome: Ongoing, Progressing     Problem: Skin Injury Risk Increased  Goal: Skin Health and Integrity  Outcome: Ongoing, Progressing     Problem: Device-Related Complication Risk (Hemodialysis)  Goal: Safe, Effective Therapy Delivery  Outcome: Ongoing, Progressing     Problem: Hemodynamic Instability (Hemodialysis)  Goal: Effective Tissue Perfusion  Outcome: Ongoing, Progressing     Problem: Infection (Hemodialysis)  Goal: Absence of Infection Signs and Symptoms  Outcome: Ongoing, Progressing     Problem: Adjustment to Illness (Stroke, Ischemic/Transient Ischemic Attack)  Goal: Optimal Coping  Outcome: Ongoing, Progressing     Problem: Bowel Elimination Impaired (Stroke, Ischemic/Transient Ischemic Attack)  Goal: Effective Bowel Elimination  Outcome: Ongoing, Progressing     Problem: Cerebral Tissue Perfusion (Stroke, Ischemic/Transient Ischemic Attack)  Goal: Optimal Cerebral Tissue Perfusion  Outcome: Ongoing, Progressing     Problem: Cognitive Impairment (Stroke, Ischemic/Transient Ischemic Attack)  Goal: Optimal Cognitive Function  Outcome: Ongoing, Progressing     Problem: Communication Impairment (Stroke, Ischemic/Transient Ischemic Attack)  Goal: Improved Communication Skills  Outcome: Ongoing, Progressing     Problem: Functional Ability Impaired (Stroke, Ischemic/Transient Ischemic Attack)  Goal: Optimal Functional Ability  Outcome: Ongoing, Progressing     Problem: Respiratory Compromise (Stroke, Ischemic/Transient Ischemic Attack)  Goal:  Effective Oxygenation and Ventilation  Outcome: Ongoing, Progressing     Problem: Sensorimotor Impairment (Stroke, Ischemic/Transient Ischemic Attack)  Goal: Improved Sensorimotor Function  Outcome: Ongoing, Progressing     Problem: Swallowing Impairment (Stroke, Ischemic/Transient Ischemic Attack)  Goal: Optimal Eating and Swallowing without Aspiration  Outcome: Ongoing, Progressing     Problem: Urinary Elimination Impaired (Stroke, Ischemic/Transient Ischemic Attack)  Goal: Effective Urinary Elimination  Outcome: Ongoing, Progressing

## 2023-05-15 NOTE — ASSESSMENT & PLAN NOTE
81 y/o male with HTN, ESRD, hemodialysis, anemia, RA initially presented 5/12/2023 with stuttering TIA symptoms with negative imaging (CT/CTA).  Admitted for stroke workup.  Developed new acute symptoms Saturday night into Sunday morning.  Treated with Tenecteplase.  There was some improvement in symptoms on Sunday but today patient has right-sided plegia and profound dysarthria; most likely due to completed stroke.  MRI completed today showing acute infarct within the left posterior limb of the internal capsule.  Concerning for anterior choroidal infarct.  Most likely small vessel disease given size and stuttering initial symptoms.  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      Antithrombotics for secondary stroke prevention: Antiplatelets: Aspirin: 81 mg daily  Clopidogrel: 75 mg daily - continue long-term in setting of basilar stenosis    Statins for secondary stroke prevention and hyperlipidemia, if present:   Statins: Atorvastatin- 40 mg daily for goal LDL < 70    Aggressive risk factor modification: HTN     Rehab efforts: The patient has been evaluated by a stroke team provider and the therapy needs have been fully considered based off the presenting complaints and exam findings. The following therapy evaluations are needed: PT evaluate and treat, OT evaluate and treat, SLP evaluate and treat    Diagnostics ordered/pending: None     VTE prophylaxis: Heparin 5000 units SQ every 8 hours      -No further stroke workup needed at this time; patient can dispo with therapy recommendations once medically ready  -Ambulatory referral to Vascular Neurology in 4-6 weeks (Consult Order [id number])

## 2023-05-15 NOTE — ASSESSMENT & PLAN NOTE
-Stroke risk factor  -SBP < 220   -Patient developed worsening symptoms - would not initiate BP control at this time.  Can slowly reduce BP over next 48-72 hours.  Avoid aggressive lowering; avoid sudden drops in BP  -Long term goal < 140/90 in setting of basilar artery stenosis- this can be achieved over the next 3-4 weeks

## 2023-05-15 NOTE — ASSESSMENT & PLAN NOTE
Tele neuro consult  S/p TPA  PT/OT/ST   CTA head and neck negative  Will need MRI   Patient has had waxing waning symptoms over several days and now has complete a stroke with right-sided weakness and right facial droop and difficulty talking for MRI

## 2023-05-15 NOTE — CONSULTS
[facility]  Vascular Neurology  Comprehensive Stroke Center  TeleVascular Neurology Consult Note    Inpatient Consult Tele-Vascular Neurology  Consult performed by: Carmel Luna NP  Consult ordered by: MAX Gutierrez-TASIA        Assessment/Plan:     Patient is a 82 y.o. 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  Admitted for stroke workup.  Developed new acute symptoms Saturday night into Sunday morning.  Treated with Tenecteplase.  There was some improvement in symptoms on Sunday but today patient has right-sided plegia and profound dysarthria; most likely due to completed stroke.  MRI completed today showing acute infarct within the left posterior limb of the internal capsule.  Concerning for anterior choroidal infarct.  Most likely small vessel disease given size and stuttering initial symptoms.  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      Antithrombotics for secondary stroke prevention: Antiplatelets: Aspirin: 81 mg daily  Clopidogrel: 75 mg daily - continue long-term in setting of basilar stenosis    Statins for secondary stroke prevention and hyperlipidemia, if present:   Statins: Atorvastatin- 40 mg daily for goal LDL < 70    Aggressive risk factor modification: HTN     Rehab efforts: The patient has been evaluated by a stroke team provider and the therapy needs have been fully considered based off the presenting complaints and exam findings. The following therapy evaluations are needed: PT evaluate and treat, OT evaluate and treat, SLP evaluate and treat    Diagnostics ordered/pending: None     VTE prophylaxis: Heparin 5000 units SQ every 8 hours      -No further  stroke workup needed at this time; patient can dispo with therapy recommendations once medically ready  -Ambulatory referral to Vascular Neurology in 4-6 weeks (Consult Order [id number])        Dysphagia  -Due to stroke  -SLP following    Flaccid hemiplegia affecting right dominant side  -Due to stroke  -PT/OT following    Dysarthria and anarthria  -Due to stroke  -Profound symptoms  -SLP following    Anemia  -Continue to monitor  -If no active bleeding can resume antiplatelet therapy as long as platelets remain above 50,000      Primary hypertension  -Stroke risk factor  -SBP < 220   -Patient developed worsening symptoms - would not initiate BP control at this time.  Can slowly reduce BP over next 48-72 hours.  Avoid aggressive lowering; avoid sudden drops in BP  -Long term goal < 140/90 in setting of basilar artery stenosis- this can be achieved over the next 3-4 weeks          STROKE DOCUMENTATION     Acute Stroke Times   Last Known Normal Date: 05/14/23  Last Known Normal Time: 0100  Symptom Onset Date: 05/14/23  Symptom Onset Time: 0100  Stroke Team Called Date: 05/14/23  Stroke Team Called Time: 0151  Stroke Team Arrival Date: 05/14/23  Stroke Team Arrival Time: 0153  CT Interpretation Time: 0159  Thrombolytic Therapy Recommended: Yes  Decision to Treat Time for Tenecteplase: 0200    NIH Scale:  1a. Level of Consciousness: 0-->Alert, keenly responsive  1b. LOC Questions: 0-->Answers both questions correctly  1c. LOC Commands: 0-->Performs both tasks correctly  2. Best Gaze: 0-->Normal  3. Visual: 0-->No visual loss  4. Facial Palsy: 2-->Partial paralysis (total or near-total paralysis of lower face)  5a. Motor Arm, Left: 0-->No drift, limb holds 90 (or 45) degrees for full 10 secs  5b. Motor Arm, Right: 4-->No movement  6a. Motor Leg, Left: 0-->No drift, leg holds 30 degree position for full 5 secs  6b. Motor Leg, Right: 4-->No movement  7. Limb Ataxia: 0-->Absent  8. Sensory: 0-->Normal, no sensory loss  9. Best  Language: 1-->Mild-to-moderate aphasia, some obvious loss of fluency or facility of comprehension, without significant limitation on ideas expressed or form of expression. Reduction of speech and/or comprehension, however, makes conversation. . . (see row details)  10. Dysarthria: 2-->Severe dysarthria, patients speech is so slurred as to be unintelligible in the absence of or out of proportion to any dysphasia, or is mute/anarthric  11. Extinction and Inattention (formerly Neglect): 0-->No abnormality  Total (NIH Stroke Scale): 13    Modified Alexandro Score: 0  Julienne Coma Scale:    ABCD2 Score:    [id number] Score:   HAS -BLED Score:   ICH Score:   [employer] Classification:       Thrombolysis Candidate? Yes, given at current hospital    Delays to Thrombolysis?  Not Applicable    Interventional Revascularization Candidate?   Is the patient eligible for mechanical endovascular reperfusion (DAVID)?  No; No large vessel occlusion identified on imaging     Delays to Thrombectomy? Not Applicable    Hemorrhagic change of an Ischemic Stroke: Does this patient have an ischemic stroke with hemorrhagic changes? No     Subjective:     History of Present Illness:  81 y/o male with HTN, ESRD, hemodialysis, anemia, RA presented with transient episode of slurred speech and right sided weakness.  Initial event started around 11am on 5/12.  The symptoms lasted about 5 minutes then resolved.  He had another event around 2pm lasting about 10 minutes then completely resolve.  Patient was seen by TeleStroke team with an NIH of 0.  Patient admitted for stroke workup.  Saturday night into Sunday morning, patient had recurrence of symptoms while receiving dialysis.  A stroke code was activated and patient was seen by TeleStroke team again with NIH of 13.  Thrombolytic therapy was recommended, along with repeat CTA to rule out LVO.  No LVO noted on repeat imaging.  Nurse states patient did have some improvement in symptoms after TNK  yesterday and was able to move his arms and legs, but today he can no longer move his right side.  Patient unable to provide history due to severe dysarthria, but is able to nod head appropriately to questions.              Past Medical History:   Diagnosis Date    Dialysis patient     Elevated PSA     Gout, unspecified     Hypertension     Renal disorder     Rheumatoid arthritis, unspecified        Past Surgical History:   Procedure Laterality Date    AV FISTULA PLACEMENT Right 3/20/2023    Procedure: CREATION, AV FISTULA;  Surgeon: Alfred Sierra MD;  Location: [facility];  Service: General;  Laterality: Right;  right basilic vein transposition    HAND SURGERY Left     urolift      in [place];       Family History   Problem Relation Age of Onset    Heart disease Father     Breast cancer Sister        Social History     Socioeconomic History    Marital status: Single   Tobacco Use    Smoking status: Former     Types: Cigarettes     Quit date:      Years since quittin.3    Smokeless tobacco: Never   Substance and Sexual Activity    Alcohol use: Not Currently     Comment: quit in     Drug use: Never    Sexual activity: Not Currently     Social Determinants of Health     Financial Resource Strain: Low Risk     Difficulty of Paying Living Expenses: Not hard at all   Food Insecurity: No Food Insecurity    Worried About Running Out of Food in the Last Year: Never true    Ran Out of Food in the Last Year: Never true   Transportation Needs: No Transportation Needs    Lack of Transportation (Medical): No    Lack of Transportation (Non-Medical): No   Physical Activity: Inactive    Days of Exercise per Week: 2 days    Minutes of Exercise per Session: 0 min   Stress: No Stress Concern Present    Feeling of Stress : Not at all   Social Connections: Socially Isolated    Frequency of Communication with Friends and Family: Once a week    Frequency of Social Gatherings with Friends and  Family: Once a week    Attends Jew Services: More than 4 times per year    Active Member of Clubs or Organizations: No    Attends Club or Organization Meetings: Never    Marital Status: Never    Housing Stability: Low Risk     Unable to Pay for Housing in the Last Year: No    Number of Places Lived in the Last Year: 1    Unstable Housing in the Last Year: No       Current Facility-Administered Medications   Medication Dose Route Frequency Provider Last Rate Last Admin    0.9%  NaCl infusion   Intravenous PRN Enterprise Yola Cerda MD        acetaminophen tablet 650 mg  650 mg Oral Q4H PRN Haroon Henry MD        atorvastatin tablet 80 mg  80 mg Oral QHS Min OLESYA Henry MD   80 mg at 05/14/23 2132    docusate sodium capsule 100 mg  100 mg Oral BID PRN AUSTEN Gutierrez        HYDROcodone-acetaminophen 5-325 mg per tablet 1 tablet  1 tablet Oral Q6H PRN Haroon Henry MD        labetaloL injection 10 mg  10 mg Intravenous Q4H PRN Haroon Henry MD        mupirocin 2 % ointment   Nasal BID Dk Syed Jr., MD   Given at 05/15/23 0834    naloxone 0.4 mg/mL injection 0.02 mg  0.02 mg Intravenous PRN Haroon Henry MD        ondansetron injection 4 mg  4 mg Intravenous Q8H PRN Haroon Henry MD        polyethylene glycol packet 17 g  17 g Oral Daily AUSTEN Gutierrez   17 g at 05/15/23 0834    sodium chloride 0.9% flush 10 mL  10 mL Intravenous Q12H PRN Haroon Henry MD         Home and current medications reviewed    Review of patient's allergies indicates:  No Known Allergies      Review of Systems   Unable to perform ROS: Patient nonverbal    Profound dysarthria      Objective:     Vitals:    05/15/23 1000 05/15/23 1045 05/15/23 1100 05/15/23 1115   BP:    (!) 154/50   Pulse: 68 82 84 72   Resp: 17 14 11 19   Temp:    97.9 °F (36.6 °C)   TempSrc:    Oral   SpO2: 99% 98% 99% 98%   Weight:       Height:            Physical Exam  Vitals reviewed.   Constitutional:       Appearance: Normal appearance.   HENT:       Head: Normocephalic and atraumatic.      Right Ear: External ear normal.      Left Ear: External ear normal.      Nose: Nose normal.   Eyes:      General: Lids are normal.   Pulmonary:      Effort: Pulmonary effort is normal. No respiratory distress.   Abdominal:      General: There is no distension.      Tenderness: There is no guarding.   Musculoskeletal:      Cervical back: No rigidity.      Right lower leg: No edema.      Left lower leg: No edema.   Neurological:      Mental Status: He is alert.      Sensory: No sensory deficit.      Motor: No weakness.   Psychiatric:         Mood and Affect: Mood normal.         Behavior: Behavior normal.     Neurological Exam  LOC: alert  Attention Span: Good   Language: Possible some expressive aphasia but difficult to fully assess due to severe dysarthria  Articulation: Profound dysarthria  Orientation: Person, Place, Time   Visual Fields: Full  EOM (CN III, IV, VI): Full/intact  Facial Sensation (CN V): Normal  Facial Movement (CN VII): Lower facial weakness on the Right  Motor: Arm left    Full antigravity; Full power noted with nurse assistance  Leg left    Full antigravity; Full power noted with nurse assistance  Arm right  No movement noted  Leg right No movement noted  Cerebellum: No ataxia on left; unable to assess due to plegia on right  Sensation: Intact to light touch    Diagnostic Results     Brain Imaging   5/12/2023 CT head reviewed and independently interpreted by me.  No early infarct signs or hemorrhage.  No mass effect.    5/14/2023 CT head reviewed and independently interpreted by me.  No early infarct signs.  No hemorrhage.  No change from CT 5/12/2023.    5/15/2023 MRI Brain reviewed and independently interpreted by me.  Diffusion restriction left posterior limb of the internal capsule. Remote lacunar infarcts bilateral cerebellum.  Significant white matter changes.  No hemorrhage.    Vessel Imaging   5/13/2023 CTA neck reviewed and independently  interpreted by me.  No high-grade stenosis or occlusion    5/13/2023 CTA brain reviewed and independently interpreted by me. 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  No other high-grade stenosis or occlusion noted.    5/14/2023 CTA neck reviewed and independently interpreted by me.  No new occlusion.  Imaging similar to previous study done 5/13.    5/14/2023 CTA brain reviewed and independently interpreted by me.  No new occlusion.  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    Cardiac Imaging   5/13/2023 TTE  EF 50%; no wall motion abnormality; no thrombus/vegetation/shunt; Normal left atrium.            VIRTUAL TELENOTE    Chief complaint: RSW, dysarthria  The patient location is: [place]  Present with the patient at the time of the telemed/virtual assessment: Self     The attending portion of this evaluation, treatment, and documentation was performed per Carmel Luna NP via Telemedicine AudioVisual using the secure [employer] software platform with 2 way audio/video. The provider was located off-site and the patient is located in the hospital. The aforementioned video software was utilized to document the relevant history and physical exam.      Carmel Luna NP  Com[facility]  Department of Vascular Neurology   [facility]

## 2023-05-15 NOTE — PROGRESS NOTES
[facility]  Nephrology  Progress Note    Patient Name: Pardeep Collins  MRN: [id number]  Admission Date: 5/12/2023  Hospital Length of Stay: 1 days  Attending Provider: Robert Pride DO   Primary Care Physician: Primary Doctor No  Principal Problem:Episode of transient neurologic symptoms    Subjective:     HPI: This gentleman with history of HTN, anemia, gout who recently started dialysis within the past month.  The patient presented with dysarthria and weakness that started on yesterday.  He had further work up with CT head showed no acute stroke and has been admitted for TIA. CTA showed no evidence of large vessel occlusion. He is awake and alert. He is dysarthric. He denies any shortness or chest pain. Nephrology is consulted for renal issues.      Interval History: The patient is sitting up in bed.  He is dysarthric. Speech therapy is at the bedside.    Review of patient's allergies indicates:  No Known Allergies  Current Facility-Administered Medications   Medication Frequency    0.9%  NaCl infusion PRN    acetaminophen tablet 650 mg Q4H PRN    atorvastatin tablet 80 mg QHS    docusate sodium capsule 100 mg BID PRN    HYDROcodone-acetaminophen 5-325 mg per tablet 1 tablet Q6H PRN    labetaloL injection 10 mg Q4H PRN    mupirocin 2 % ointment BID    naloxone 0.4 mg/mL injection 0.02 mg PRN    ondansetron injection 4 mg Q8H PRN    polyethylene glycol packet 17 g Daily    sodium chloride 0.9% flush 10 mL Q12H PRN       Objective:     Vital Signs (Most Recent):  Temp: 98.2 °F (36.8 °C) (05/15/23 0705)  Pulse: 77 (05/15/23 0900)  Resp: 11 (05/15/23 0900)  BP: (!) 141/81 (05/15/23 0900)  SpO2: 100 % (05/15/23 0900) Vital Signs (24h Range):  Temp:  [97.6 °F (36.4 °C)-98.3 °F (36.8 °C)] 98.2 °F (36.8 °C)  Pulse:  [40-97] 77  Resp:  [9-41] 11  SpO2:  [69 %-100 %] 100 %  BP: ()/() 141/81     Weight: 56.9 kg (125 lb 7.1 oz) (05/15/23 0243)  Body mass index is 19.07 kg/m².  Body  surface area is 1.65 meters squared.    I/O last 3 completed shifts:  In: -   Out: 375 [Urine:375]     Physical Exam  Vitals reviewed.   HENT:      Head: Normocephalic and atraumatic.      Mouth/Throat:      Mouth: Mucous membranes are moist.   Cardiovascular:      Rate and Rhythm: Regular rhythm.   Pulmonary:      Effort: Pulmonary effort is normal.   Abdominal:      Palpations: Abdomen is soft.   Skin:     General: Skin is warm.   Neurological:      Mental Status: He is alert.   Psychiatric:         Mood and Affect: Mood normal.        Significant Labs:  BMP:   Recent Labs   Lab 05/12/23  2000 05/13/23  0527 05/15/23  0438   *   < > 96   *   < > 140   K 3.4*   < > 3.7   CL 95*   < > 102   CO2 32   < > 29   BUN 15   < > 21*   CREATININE 4.51*   < > 5.41*   CALCIUM 8.6   < > 8.7   MG 2.0  --   --     < > = values in this interval not displayed.     CMP:   Recent Labs   Lab 05/12/23 2000 05/13/23 0527 05/14/23 0658 05/15/23  0438   *   < > 96 96   CALCIUM 8.6   < > 8.7 8.7   ALBUMIN 2.9*   < > 2.6*  --    PROT 6.9  --   --   --    *   < > 138 140   K 3.4*   < > 3.5 3.7   CO2 32   < > 30 29   CL 95*   < > 101 102   BUN 15   < > 17 21*   CREATININE 4.51*   < > 4.09* 5.41*   ALKPHOS 63  --   --   --    ALT 13*  --   --   --    AST 11*  --   --   --    BILITOT 0.6  --   --   --     < > = values in this interval not displayed.        Significant Imaging:  Labs: Reviewed    Assessment/Plan:     Neuro  Stroke due to occlusion of left middle cerebral artery  Further neurologic work up.    Cardiac/Vascular  Primary hypertension  Chronic condition    Renal/  ESRD (end stage renal disease)  New to dialysis.  Volume status is stable.    Oncology  Anemia  Chronic kidney disease    Dialysis today after MRI.    Thank you for your consult. I will follow-up with patient. Please contact us if you have any additional questions.    Chandler Aceves Jr, MD  Nephrology  [facility]

## 2023-05-15 NOTE — PT/OT/SLP EVAL
Occupational Therapy   Evaluation    Name: Pardeep Collins  MRN: [id number]  Admitting Diagnosis: Episode of transient neurologic symptoms  Recent Surgery: * No surgery found *      Recommendations:     Discharge Recommendations: nursing facility, skilled, rehabilitation facility  Discharge Equipment Recommendations:  other (see comments) (to be determined)  Barriers to discharge:  Decreased caregiver support    Assessment:     Pardeep Collins is a 82 y.o. male with a medical diagnosis of Episode of transient neurologic symptoms.  He presents with CVA. Performance deficits affecting function: weakness, impaired endurance, impaired self care skills, impaired functional mobility, gait instability, impaired balance, decreased upper extremity function, decreased lower extremity function, impaired coordination.      Rehab Prognosis: Good; patient would benefit from acute skilled OT services to address these deficits and reach maximum level of function.       Plan:     Patient to be seen 5 x/week to address the above listed problems via self-care/home management, therapeutic exercises, therapeutic activities, neuromuscular re-education  Plan of Care Expires: 06/12/23  Plan of Care Reviewed with: patient    Subjective     Chief Complaint: CVA  Patient/Family Comments/goals: pt agreeable to OT eval    Occupational Profile:  Living Environment: pt lives alone in one story home with 4 steps to enter with handrails  Previous level of function: independent with all ADL tasks and utilized cane for functional mobility   Roles and Routines: perform self care  Equipment Used at Home: cane, straight, walker, rolling  Assistance upon Discharge: swing bed versus inpatient rehab    Pain/Comfort:  Pain Rating 1: 0/10    Patients cultural, spiritual, Anabaptism conflicts given the current situation: no    Objective:     Communicated with: STEFANY Cavanaugh prior to session.  Patient found HOB elevated with blood pressure cuff, peripheral IV, pulse  ox (continuous), telemetry upon OT entry to room.    General Precautions: Standard, fall  Orthopedic Precautions: N/A  Braces: N/A  Respiratory Status: Room air    Occupational Performance:    Bed Mobility:    Patient completed Supine to Sit with moderate assistance  Patient completed Sit to Supine with moderate assistance    Functional Mobility/Transfers:  Patient completed Sit <> Stand Transfer with minimum assistance and of 2 persons  with  rolling walker   Patient completed Toilet Transfer Step Transfer technique with maximal assistance and of 2 persons with  rolling walker  Functional Mobility: pt performed steps to bedside toilet with MaxA    Activities of Daily Living:  Upper Body Dressing: maximal assistance to bunny gown  Toileting: maximal assistance to perform toilet hygiene    Cognitive/Visual Perceptual:  Cognitive/Psychosocial Skills:     -       Oriented to: Person, Place, Time, and Situation   -       Follows Commands/attention:Follows multistep  commands  -       Communication: expressive aphasia  -       Mood/Affect/Coping skills/emotional control: Cooperative    Physical Exam:  Balance:    -       sitting balance fair; standing balance MaxA with hand held assist  Upper Extremity Range of Motion:     -       Right Upper Extremity: WFL  -       Left Upper Extremity: WFL  Upper Extremity Strength:    -       Right Upper Extremity: elbow, wrist, and hand 0/5; shoulder internal rotation 1/5; shoulder flexion 1/5  -       Left Upper Extremity: WFL  Gross motor coordination:   hemiplegia/paresis    AMPAC 6 Click ADL:  AMPAC Total Score: 14    Treatment & Education:  Pt educated on OT role/POC.   Importance of OOB activity with staff assistance.  Importance of sitting up in the chair throughout the day as tolerated, especially for meals   Safety during functional t/f and mobility with use of hemiwalker  Importance of assisting with self-care activities   All questions/concerns answered within OT scope of  practice      Patient left HOB elevated with all lines intact, call button in reach, and STEFANY Cavanaugh notified    GOALS:   Multidisciplinary Problems       Occupational Therapy Goals          Problem: Occupational Therapy    Goal Priority Disciplines Outcome Interventions   Occupational Therapy Goal     OT, PT/OT Ongoing, Progressing    Description: STG:  Pt will perform grooming with setup  Pt will bathe upper body and anterior trunk with ModA with setup at EOB  Pt will perform UE dressing with Janelle with hemiplegic technique   Pt will sit EOB x 10 min with SBA   Pt will transfer bed/chair/bsc with ModA with hemiwalker  Pt will perform standing task x 1 min with ModA with hemiwalker   Pt will tolerate 15 minutes of tx without fatigue      LT.Restore to max I with self care and mobility.                           History:     Past Medical History:   Diagnosis Date    Dialysis patient     Elevated PSA     Gout, unspecified     Hypertension     Renal disorder     Rheumatoid arthritis, unspecified          Past Surgical History:   Procedure Laterality Date    AV FISTULA PLACEMENT Right 3/20/2023    Procedure: CREATION, AV FISTULA;  Surgeon: Alfred Sierra MD;  Location: [facility];  Service: General;  Laterality: Right;  right basilic vein transposition    HAND SURGERY Left     urolift      in [place];       Time Tracking:     OT Date of Treatment: 05/15/23  OT Start Time: 931  OT Stop Time: 957  OT Total Time (min): 26 min    Billable Minutes:Evaluation OT mod complexity eval    5/15/2023

## 2023-05-15 NOTE — PLAN OF CARE
Problem: Occupational Therapy  Goal: Occupational Therapy Goal  Description: STG:  Pt will perform grooming with setup  Pt will bathe upper body and anterior trunk with ModA with setup at EOB  Pt will perform UE dressing with Janelle with hemiplegic technique   Pt will sit EOB x 10 min with SBA   Pt will transfer bed/chair/bsc with ModA with hemiwalker  Pt will perform standing task x 1 min with ModA with hemiwalker   Pt will tolerate 15 minutes of tx without fatigue      LT.Restore to max I with self care and mobility.      Outcome: Ongoing, Progressing

## 2023-05-15 NOTE — PROGRESS NOTES
[facility]  Pulmonology  Progress Note    Patient Name: Pardeep Collins  MRN: [id number]  Admission Date: 5/12/2023  Hospital Length of Stay: 1 days  Code Status: Full Code  Attending Provider: Robert Pride DO  Primary Care Provider: Primary Doctor No   Principal Problem: Episode of transient neurologic symptoms    Subjective:     Interval History:  Patient with right-sided weakness      Objective:     Vital Signs (Most Recent):  Temp: 97.6 °F (36.4 °C) (05/15/23 0301)  Pulse: 72 (05/15/23 0515)  Resp: 11 (05/15/23 0515)  BP: (!) 150/74 (05/15/23 0515)  SpO2: 100 % (05/15/23 0515) Vital Signs (24h Range):  Temp:  [97.6 °F (36.4 °C)-98.3 °F (36.8 °C)] 97.6 °F (36.4 °C)  Pulse:  [40-97] 72  Resp:  [9-41] 11  SpO2:  [69 %-100 %] 100 %  BP: ()/() 150/74     Weight: 56.9 kg (125 lb 7.1 oz)  Body mass index is 19.07 kg/m².      Intake/Output Summary (Last 24 hours) at 5/15/2023 0556  Last data filed at 5/14/2023 2301  Gross per 24 hour   Intake --   Output 175 ml   Net -175 ml        Physical Exam  Vitals reviewed.   Constitutional:       Appearance: Normal appearance.      Interventions: He is not intubated.  HENT:      Head: Normocephalic and atraumatic.      Nose: Nose normal.      Mouth/Throat:      Mouth: Mucous membranes are dry.      Pharynx: Oropharynx is clear.   Eyes:      Extraocular Movements: Extraocular movements intact.      Conjunctiva/sclera: Conjunctivae normal.      Pupils: Pupils are equal, round, and reactive to light.   Cardiovascular:      Rate and Rhythm: Normal rate.      Heart sounds: Normal heart sounds. No murmur heard.  Pulmonary:      Effort: Pulmonary effort is normal. He is not intubated.      Breath sounds: Normal breath sounds.   Abdominal:      General: Abdomen is flat. Bowel sounds are normal.      Palpations: Abdomen is soft.   Musculoskeletal:         General: Normal range of motion.      Cervical back: Normal range of motion and neck supple.      Right  lower leg: No edema.      Left lower leg: No edema.   Skin:     General: Skin is warm and dry.      Capillary Refill: Capillary refill takes less than 2 seconds.   Neurological:      General: No focal deficit present.      Mental Status: He is alert and oriented to person, place, and time.   Psychiatric:         Mood and Affect: Mood normal.         Behavior: Behavior normal.         Review of Systems    Vents:  Oxygen Concentration (%): 28 (05/13/23 1938)    Lines/Drains/Airways       Drain  Duration                  Hemodialysis AV Fistula 03/20/23 Right upper arm 56 days              Peripheral Intravenous Line  Duration                  Peripheral IV - Single Lumen 05/12/23 18 G Left;Posterior Hand 3 days                    Significant Labs:    CBC/Anemia Profile:  Recent Labs   Lab 05/14/23  0658 05/15/23  0438   WBC 4.61 4.60   HGB 7.5* 7.7*   HCT 24.2* 24.6*   * 115*   .1* 102.9*   RDW 12.2 12.5        Chemistries:  Recent Labs   Lab 05/14/23  0658 05/15/23  0438    140   K 3.5 3.7    102   CO2 30 29   BUN 17 21*   CREATININE 4.09* 5.41*   CALCIUM 8.7 8.7   ALBUMIN 2.6*  --    PHOS 3.8  --        Recent Lab Results         05/15/23  0438   05/14/23 0658        Albumin   2.6       Anion Gap 13   11       Baso # 0.02   0.01       Basophil % 0.4   0.2       BUN 21   17       BUN/CREAT RATIO 4   4       Calcium 8.7   8.7       Chloride 102   101       CO2 29   30       Creatinine 5.41   4.09       Differential Type Auto   Auto       eGFR 10   14       Eos # 0.20   0.15       Eosinophil % 4.3   3.3       Glucose 96   96       Hematocrit 24.6   24.2       Hemoglobin 7.7   7.5       Immature Grans (Abs) 0.02   0.02       Immature Granulocytes 0.4   0.4       Lymph # 1.25   1.48       Lymph % 27.2   32.1       Macrocytosis   1+       MCH 32.2   31.6       MCHC 31.3   31.0       .9   102.1       Mono # 0.48   0.37       Mono % 10.4   8.0       MPV 13.6   13.5       Neutrophils, Abs  2.63   2.58       Neutrophils Relative 57.3   56.0       nRBC 0.0   0.0       NUCLEATED RBC ABSOLUTE 0.00   0.00       Ovalocytes   Few       Phosphorus   3.8       PLATELET MORPHOLOGY   Decreased  Comment: Large & Giant Platelets       Platelets 115   112       Potassium 3.7   3.5       RBC 2.39   2.37       RDW 12.5   12.2       Sodium 140   138       WBC 4.60   4.61               Significant Imaging:  I have reviewed all pertinent imaging results/findings within the past 24 hours.    Assessment/Plan:     Neuro  Stroke due to occlusion of left middle cerebral artery  Tele neuro consult  S/p TPA  PT/OT/ST   CTA head and neck negative  Will need MRI   Patient has had waxing waning symptoms over several days and now has complete a stroke with right-sided weakness and right facial droop and difficulty talking for MRI    Cardiac/Vascular  Primary hypertension  - BP trend stable at present     Renal/  ESRD (end stage renal disease)  New to dialysis.  Volume status is stable.  Nephrology following     Oncology  Anemia  Chronic kidney disease  H/H stable at 7/24                Oscar Diana MD  Pulmonology  [facility]

## 2023-05-15 NOTE — PROGRESS NOTES
[facility] [address] [place] [address]  Adult Nutrition  First Assessment Note         Reason for Assessment  Reason For Assessment: RD follow-up   Nutrition Risk Screen: no indicators present    Assessment and Plan  5/15-Patient seen for followup and NFPE.     Patient was sleeping and did not arouse to name being called x 2. No family was bedside. Patient visibly has moderate wasting to temple, buccal, orbital, clavicle and acromion areas. That, coupled with 13% weight loss x 2 months meets criteria for moderate protein-calorie malnutrition per ASPEN guidelines.     He was evaluated by ST this morning and was downgraded to Puree with Thin liquids. Lunch was documented as 100%. Recommend continue puree diet as able/appropriate and tolerated. He is also receiving Ensure Max Protein TID. Recommend continue as able/appropriate and tolerated.     Last BM 5/10. Monitor labs, meds, weights, po intakes/diet changes, updates in pt condition. RD following.     Malnutrition  Is Patient Malnourished: Yes   Nutrition consulted. Most recent weight and BMI monitored-     Measurements:  Wt Readings from Last 1 Encounters:   05/15/23 56.9 kg (125 lb 7.1 oz)   Body mass index is 19.07 kg/m².    Patient has been screened and assessed by RD.    Malnutrition Type:  Context: chronic illness  Level: moderate    Malnutrition Characteristic Summary:  Weight Loss (Malnutrition): 10% in 6 months    Interventions/Recommendations (treatment strategy):  Recommend continue puree diet with thin liquids as able/appropriate and tolerated. Continue Ensure Max Protein TID. Encourage good po intakes.    Skin Integrity  Randy Risk Assessment  Sensory Perception: 4-->no impairment  Moisture: 4-->rarely moist  Activity: 1-->bedfast  Mobility: 2-->very limited  Nutrition: 2-->probably inadequate  Friction and Shear: 3-->no apparent problem  Randy Score: 16    Nutrition Diagnosis  Unintentional weight loss   related to Inadequate Caloric intake  as evidenced by weight loss of 20 lbs x 2 months, 14 lbs x 1 month    Nutrition Diagnosis Status: New    Nutrition Risk  Level of Risk/Frequency of Follow-up: high    Recent Labs   Lab 05/12/23  1953 05/12/23  2000 05/15/23  0438   GLU  --    < > 96   POCGLU 117*  --   --     < > = values in this interval not displayed.     Comments on Glucose: no hx DM  Nutrition Prescription / Recommendations  Recommendation/Intervention: Recommend continue puree diet with thin liquids as able/appropriate and tolerated. Continue Ensure Max Protein TID. Encourage good po intakes.  Goals: Weight maintenance, intake % of meals and supplements.  Nutrition Goal Status: progressing towards goal  Current Diet Order: Puree with thin liquids  Chewing or Swallowing Difficulty?: No Chewing or swallowing difficulty  Recommended Diet: Puree/cardiac  Recommended Oral Supplement: Ensure Max Protein [150 kcals, 30g Protein, 6g Carbs(2g Fiber, 1g Sugar), 1.5g Fat] three times daily  Is Nutrition Support Recommended: No  Is Education Recommended: No  Monitor and Evaluation  % current Intake: Unknown/ No P.O. intake documented  % intake to meet estimated needs: P.O. + Supplements  Food and Nutrient Intake: food and beverage intake  Food and Nutrient Adminstration: diet order  Anthropometric Measurements: weight, weight change  Biochemical Data, Medical Tests and Procedures: electrolyte and renal panel, gastrointestinal profile, glucose/endocrine profile, inflammatory profile, lipid profile  Nutrition-Focused Physical Findings: overall appearance, extremities, muscles and bones, head and eyes, skin     Current Medical Diagnosis and Past Medical History  Diagnosis: other (see comments) (episode of transient neurologic symptoms)  Past Medical History:   Diagnosis Date    Dialysis patient     Elevated PSA     Gout, unspecified     Hypertension     Renal disorder     Rheumatoid arthritis, unspecified      Nutrition/Diet History  Spiritual, Cultural  "Beliefs, Orthodoxy Practices, Values that Affect Care: no  Lab/Procedures/Meds  Recent Labs   Lab 05/12/23 2000 05/13/23  0527 05/14/23  0658 05/15/23  0438   *   < > 138 140   K 3.4*   < > 3.5 3.7   BUN 15   < > 17 21*   CREATININE 4.51*   < > 4.09* 5.41*   CALCIUM 8.6   < > 8.7 8.7   ALBUMIN 2.9*   < > 2.6*  --    CL 95*   < > 101 102   ALT 13*  --   --   --    AST 11*  --   --   --    PHOS  --    < > 3.8  --     < > = values in this interval not displayed.     Last A1c: No results found for: HGBA1C  Lab Results   Component Value Date    RBC 2.39 (L) 05/15/2023    HGB 7.7 (L) 05/15/2023    HCT 24.6 (L) 05/15/2023    .9 (H) 05/15/2023    MCH 32.2 (H) 05/15/2023    MCHC 31.3 (L) 05/15/2023    TIBC 171 (L) 05/12/2023     Pertinent Labs Reviewed: reviewed  Pertinent Labs Comments: K 3.2(L)-unsure etiology, recommend replete to WNL as appropraite; Cl 96(L)-possibly r/t volume status; Creat 4.71(H), BUN/Creat 4(L), GFR 12(L)-r/t ESRD on HD; Alb 2.6(L)-poss r/t ESRD, inflammatory response/stress response/inadequate protein intakes to meet increased needs via ESRD. Will monitor trends.   Pertinent Medications Reviewed: reviewed  Pertinent Medications Comments: aspirin, atorvastatin, clopidogrel, enoxaparin  Anthropometrics  Temp: 97.9 °F (36.6 °C)  Height Method: Stated  Height: 5' 8" (172.7 cm)  Height (inches): 68 in  Weight Method: Bed Scale  Weight: 56.9 kg (125 lb 7.1 oz)  Weight (lb): 125.44 lb  Ideal Body Weight (IBW), Male: 154 lb  % Ideal Body Weight, Male (lb): 81.17 %  BMI (Calculated): 19.1     Estimated/Assessed Needs  Weight Used For Calorie Calculations: 56.7 kg (125 lb)   Energy Need Method: Kcal/kg Energy Calorie Requirements (kcal): 5291-2618 kcal (30-35 kcal/kg)  Weight Used For Protein Calculations: 56.7 kg (125 lb)  Protein Requirements: 68-79 g PRO (1.2-1.4)       RDA Method (mL): 1701     Nutrition by Nursing  Diet/Nutrition Received: mechanical/dental soft  Intake (%): 100%      "   Last Bowel Movement: 05/10/23              Nutrition Follow-Up  RD Follow-up?: Yes

## 2023-05-15 NOTE — ASSESSMENT & PLAN NOTE
-Continue to monitor  -If no active bleeding can resume antiplatelet therapy as long as platelets remain above 50,000

## 2023-05-15 NOTE — PLAN OF CARE
Problem: Physical Therapy  Goal: Physical Therapy Goal  Description: Short term goals:  1. Supine to sit with MInimal Assistance  2. Sit to stand transfer with Minimal Assistance  3. Bed to chair transfer with Minimal Assistance using Rolling Walker  4. Gait  x 25 feet with Minimal Assistance using Rolling Walker.     Long term goals:  1. Supine to sit with Contact Guard Assistance  2. Sit to stand transfer with Contact Guard Assistance  3. Bed to chair transfer with Contact Guard Assistance using Rolling Walker  4. Gait  x 50 feet with Contact Guard Assistance using Rolling Walker.    Outcome: Ongoing, Progressing

## 2023-05-15 NOTE — PT/OT/SLP EVAL
Physical Therapy Evaluation     Patient Name: Pardeep Collins   MRN: [id number]  Recent Surgery: * No surgery found *      Recommendations:     Discharge Recommendations: nursing facility, skilled, rehabilitation facility   Discharge Equipment Recommendations: other (see comments) (to be determinied)   Barriers to discharge: Increased level of assist and Inaccessible home    Assessment:     Pardeep Collins is a 82 y.o. male admitted with a medical diagnosis of Episode of transient neurologic symptoms. He presents with the following impairments/functional limitations: weakness, impaired functional mobility, gait instability, impaired balance, decreased coordination, decreased upper extremity function, decreased lower extremity function, impaired coordination. Pt presents with right sided weakness in Both UE and LE. Per family, pt had low functional level prior to admission. He was able to stand with minimal assistance of 2 but required maximum assistance to transfer. Will need PT to increase mobility    Rehab Prognosis: Fair; patient would benefit from acute PT services to address these deficits and reach maximum level of function.    Plan:     During this hospitalization, patient to be seen 5 x/week to address the above listed problems via gait training, therapeutic activities, therapeutic exercises, neuromuscular re-education, wheelchair management/training    Plan of Care Expires: 06/15/23    Subjective     Chief Complaint: right sided weakness  Patient Comments/Goals: Pt is agreeable to PT  Pain/Comfort:  Pain Rating 1: 0/10  Pain Rating Post-Intervention 1: 0/10    Social History:  Living Environment: Patient lives alone in a single story home with number of outside stair(s): 0  Prior Level of Function: Prior to admission, patient ambulated household distances using straight cane  Equipment Used at Home: cane, straight, walker, rolling  DME owned (not currently used): none  Assistance Upon Discharge: family and  facility staff    Objective:     Communicated with BECKI Tay RN prior to session. Patient found HOB elevated with blood pressure cuff, peripheral IV, pulse ox (continuous), telemetry upon PT entry to room.    General Precautions: Standard, aspiration, aphasia, fall   Orthopedic Precautions: N/A   Braces: N/A    Respiratory Status: Room air    Exams:  Cognition: Patient is oriented to unable to assess due to aphasia  RLE ROM: WFL  RLE Strength:  0/5  LLE ROM: WFL  LLE Strength: WFL  Gross Motor Coordination: WFL  Sensation:    -       Intact    Functional Mobility:  Gait belt applied - Yes  Bed Mobility  Scooting: minimum assistance  Supine to Sit: maximal assistance for LE management and trunk management  Sit to Supine: maximal assistance for LE management and trunk management  Transfers  Sit to Stand: minimum assistance and of 2 persons with hand-held assist  Toilet Transfer: maximal assistance with rolling walker using Step Transfer  Gait  Unable   Balance  Sitting: contact guard assistance  Standing: contact guard assistance      Therapeutic Activities and Exercises:   Patient educated on role of acute care PT and PT POC, safety while in hospital including calling nurse for mobility, and call light usage      AM-PAC 6 CLICK MOBILITY  Total Score:13    Patient left HOB elevated with all lines intact and call button in reach.    GOALS:   Multidisciplinary Problems       Physical Therapy Goals          Problem: Physical Therapy    Goal Priority Disciplines Outcome Goal Variances Interventions   Physical Therapy Goal     PT, PT/OT Ongoing, Progressing     Description: Short term goals:  1. Supine to sit with MInimal Assistance  2. Sit to stand transfer with Minimal Assistance  3. Bed to chair transfer with Minimal Assistance using Rolling Walker  4. Gait  x 25 feet with Minimal Assistance using Rolling Walker.     Long term goals:  1. Supine to sit with Contact Guard Assistance  2. Sit to stand transfer with Contact  Guard Assistance  3. Bed to chair transfer with Contact Guard Assistance using Rolling Walker  4. Gait  x 50 feet with Contact Guard Assistance using Rolling Walker.                         History:     Past Medical History:   Diagnosis Date    Dialysis patient     Elevated PSA     Gout, unspecified     Hypertension     Renal disorder     Rheumatoid arthritis, unspecified        Past Surgical History:   Procedure Laterality Date    AV FISTULA PLACEMENT Right 3/20/2023    Procedure: CREATION, AV FISTULA;  Surgeon: Alfred Sierra MD;  Location: [facility];  Service: General;  Laterality: Right;  right basilic vein transposition    HAND SURGERY Left     urolift      in [place];       Time Tracking:     PT Received On: 05/15/23  PT Start Time: 0930  PT Stop Time: 0957  PT Total Time (min): 27 min     Billable Minutes: Evaluation moderate complexity    5/15/2023

## 2023-05-15 NOTE — PLAN OF CARE
Problem: Adult Inpatient Plan of Care  Goal: Plan of Care Review  Outcome: Ongoing, Progressing  Goal: Patient-Specific Goal (Individualized)  Outcome: Ongoing, Progressing  Goal: Absence of Hospital-Acquired Illness or Injury  Outcome: Ongoing, Progressing  Goal: Optimal Comfort and Wellbeing  Outcome: Ongoing, Progressing  Intervention: Provide Person-Centered Care  Flowsheets (Taken 5/15/2023 1755)  Trust Relationship/Rapport:   care explained   reassurance provided   choices provided   thoughts/feelings acknowledged   emotional support provided   empathic listening provided   questions answered   questions encouraged  Goal: Readiness for Transition of Care  Outcome: Ongoing, Progressing     Problem: Fall Injury Risk  Goal: Absence of Fall and Fall-Related Injury  Outcome: Ongoing, Progressing     Problem: Skin Injury Risk Increased  Goal: Skin Health and Integrity  Outcome: Ongoing, Progressing     Problem: Device-Related Complication Risk (Hemodialysis)  Goal: Safe, Effective Therapy Delivery  Outcome: Ongoing, Progressing     Problem: Hemodynamic Instability (Hemodialysis)  Goal: Effective Tissue Perfusion  Outcome: Ongoing, Progressing     Problem: Infection (Hemodialysis)  Goal: Absence of Infection Signs and Symptoms  Outcome: Ongoing, Progressing     Problem: Adjustment to Illness (Stroke, Ischemic/Transient Ischemic Attack)  Goal: Optimal Coping  Outcome: Ongoing, Progressing  Intervention: Support Psychosocial Response to Stroke  Flowsheets (Taken 5/15/2023 1755)  Supportive Measures:   active listening utilized   counseling provided  Family/Support System Care: support provided     Problem: Bowel Elimination Impaired (Stroke, Ischemic/Transient Ischemic Attack)  Goal: Effective Bowel Elimination  Outcome: Ongoing, Progressing  Intervention: Promote Effective Bowel Elimination  Flowsheets (Taken 5/15/2023 1755)  Bowel Elimination Management: toileting offered     Problem: Cerebral Tissue Perfusion  (Stroke, Ischemic/Transient Ischemic Attack)  Goal: Optimal Cerebral Tissue Perfusion  Outcome: Ongoing, Progressing     Problem: Cognitive Impairment (Stroke, Ischemic/Transient Ischemic Attack)  Goal: Optimal Cognitive Function  Outcome: Ongoing, Progressing     Problem: Communication Impairment (Stroke, Ischemic/Transient Ischemic Attack)  Goal: Improved Communication Skills  Outcome: Ongoing, Progressing     Problem: Functional Ability Impaired (Stroke, Ischemic/Transient Ischemic Attack)  Goal: Optimal Functional Ability  Outcome: Ongoing, Progressing  Intervention: Optimize Functional Ability  Flowsheets (Taken 5/15/2023 8711)  Self-Care Promotion: independence encouraged     Problem: Respiratory Compromise (Stroke, Ischemic/Transient Ischemic Attack)  Goal: Effective Oxygenation and Ventilation  Outcome: Ongoing, Progressing     Problem: Sensorimotor Impairment (Stroke, Ischemic/Transient Ischemic Attack)  Goal: Improved Sensorimotor Function  Outcome: Ongoing, Progressing     Problem: Swallowing Impairment (Stroke, Ischemic/Transient Ischemic Attack)  Goal: Optimal Eating and Swallowing without Aspiration  Outcome: Ongoing, Progressing     Problem: Urinary Elimination Impaired (Stroke, Ischemic/Transient Ischemic Attack)  Goal: Effective Urinary Elimination  Outcome: Ongoing, Progressing

## 2023-05-15 NOTE — SUBJECTIVE & OBJECTIVE
Interval History: The patient is sitting up in bed.  He is dysarthric. Speech therapy is at the bedside.    Review of patient's allergies indicates:  No Known Allergies  Current Facility-Administered Medications   Medication Frequency    0.9%  NaCl infusion PRN    acetaminophen tablet 650 mg Q4H PRN    atorvastatin tablet 80 mg QHS    docusate sodium capsule 100 mg BID PRN    HYDROcodone-acetaminophen 5-325 mg per tablet 1 tablet Q6H PRN    labetaloL injection 10 mg Q4H PRN    mupirocin 2 % ointment BID    naloxone 0.4 mg/mL injection 0.02 mg PRN    ondansetron injection 4 mg Q8H PRN    polyethylene glycol packet 17 g Daily    sodium chloride 0.9% flush 10 mL Q12H PRN       Objective:     Vital Signs (Most Recent):  Temp: 98.2 °F (36.8 °C) (05/15/23 0705)  Pulse: 77 (05/15/23 0900)  Resp: 11 (05/15/23 0900)  BP: (!) 141/81 (05/15/23 0900)  SpO2: 100 % (05/15/23 0900) Vital Signs (24h Range):  Temp:  [97.6 °F (36.4 °C)-98.3 °F (36.8 °C)] 98.2 °F (36.8 °C)  Pulse:  [40-97] 77  Resp:  [9-41] 11  SpO2:  [69 %-100 %] 100 %  BP: ()/() 141/81     Weight: 56.9 kg (125 lb 7.1 oz) (05/15/23 0243)  Body mass index is 19.07 kg/m².  Body surface area is 1.65 meters squared.    I/O last 3 completed shifts:  In: -   Out: 375 [Urine:375]     Physical Exam  Vitals reviewed.   HENT:      Head: Normocephalic and atraumatic.      Mouth/Throat:      Mouth: Mucous membranes are moist.   Cardiovascular:      Rate and Rhythm: Regular rhythm.   Pulmonary:      Effort: Pulmonary effort is normal.   Abdominal:      Palpations: Abdomen is soft.   Skin:     General: Skin is warm.   Neurological:      Mental Status: He is alert.   Psychiatric:         Mood and Affect: Mood normal.        Significant Labs:  BMP:   Recent Labs   Lab 05/12/23  2000 05/13/23  0527 05/15/23  0438   *   < > 96   *   < > 140   K 3.4*   < > 3.7   CL 95*   < > 102   CO2 32   < > 29   BUN 15   < > 21*   CREATININE 4.51*   < > 5.41*   CALCIUM 8.6    < > 8.7   MG 2.0  --   --     < > = values in this interval not displayed.     CMP:   Recent Labs   Lab 05/12/23 2000 05/13/23  0527 05/14/23  0658 05/15/23  0438   *   < > 96 96   CALCIUM 8.6   < > 8.7 8.7   ALBUMIN 2.9*   < > 2.6*  --    PROT 6.9  --   --   --    *   < > 138 140   K 3.4*   < > 3.5 3.7   CO2 32   < > 30 29   CL 95*   < > 101 102   BUN 15   < > 17 21*   CREATININE 4.51*   < > 4.09* 5.41*   ALKPHOS 63  --   --   --    ALT 13*  --   --   --    AST 11*  --   --   --    BILITOT 0.6  --   --   --     < > = values in this interval not displayed.        Significant Imaging:  Labs: Reviewed

## 2023-05-15 NOTE — PT/OT/SLP PROGRESS
Speech Language Pathology Treatment    Patient Name:  Pardeep Collins   MRN:  [id number]  Admitting Diagnosis: Episode of transient neurologic symptoms    Recommendations:                 General Recommendations:  Speech/language therapy  Diet recommendations:  Puree, Liquid Diet Level: Thin   Aspiration Precautions: 1 bite/sip at a time, Alternating bites/sips, Assistance with meals, Check for pocketing/oral residue, Frequent oral care, HOB to 90 degrees, Remain upright 30 minutes post meal, Small bites/sips, and Standard aspiration precautions   General Precautions: Standard,    Communication strategies:   Patient presents with severe dysarthria. Patient seemed to be vocalizing even less today.     Assessment:     Pardeep Collins is a 82 y.o. male with an SLP diagnosis of  CVA .  He presents with right sided facial weakness and severe dysarthria.    Subjective     Patient sitting up in bed attempting to eat his breakfast. Patient was able to drink the liquids but the solids were pocketing in his right cheek. Patient finally had to dig the eggs out with his finger. Discussed with patient's nurse (Afshan) and FNP (Olimpia) that patient' diet needed to be downgraded to pureed with thin liquids at this time.  Patient goals: not stated     Pain/Comfort:       Respiratory Status: Room air    Objective:     Has the patient been evaluated by SLP for swallowing?   Yes  Keep patient NPO? No (Pt needs to be changed to a pureed diet consistency with thin liquids.)   Current Respiratory Status:        Patient completed lingual protrusions, elevations, depressions, and lats with min to mod accuracy after cues/imitation.  Patient completed labial protrusions and retractions with min accuracy after cues/imitation.  Patient utilized dysarthria techniques in  words with 40% accuracy after cues/imitation.    Goals:   Multidisciplinary Problems       SLP Goals          Problem: SLP    Goal Priority Disciplines Outcome   SLP Goal     SLP  Ongoing, Progressing   Description: Patient will complete lingual protrusions, elevations, depressions, and lats with mod-max accuracy Independently.   Patient will complete labial protrusions and retractions with mod-max accuracy Independently.   Patient will utilize dysarthria techniques with 80% accuracy Independently.                           Plan:     Patient to be seen:  5 x/week   Plan of Care expires:  05/28/23  Plan of Care reviewed with:      SLP Follow-Up:  Yes       Discharge recommendations:      Barriers to Discharge:  Decreased Care Giver Support    Time Tracking:     SLP Treatment Date:   05/15/23  Speech Start Time:  0903  Speech Stop Time:  0923     Speech Total Time (min):  20 min    Billable Minutes: Speech Therapy Individual 20    05/15/2023

## 2023-05-16 LAB
ANION GAP SERPL CALCULATED.3IONS-SCNC: 11 MMOL/L (ref 7–16)
BASOPHILS # BLD AUTO: 0.02 K/UL (ref 0–0.2)
BASOPHILS NFR BLD AUTO: 0.4 % (ref 0–1)
BUN SERPL-MCNC: 37 MG/DL (ref 7–18)
BUN/CREAT SERPL: 6 (ref 6–20)
CALCIUM SERPL-MCNC: 8.4 MG/DL (ref 8.5–10.1)
CHLORIDE SERPL-SCNC: 103 MMOL/L (ref 98–107)
CO2 SERPL-SCNC: 29 MMOL/L (ref 21–32)
CREAT SERPL-MCNC: 6.62 MG/DL (ref 0.7–1.3)
DIFFERENTIAL METHOD BLD: ABNORMAL
EGFR (NO RACE VARIABLE) (RUSH/TITUS): 8 ML/MIN/1.73M2
EOSINOPHIL # BLD AUTO: 0.28 K/UL (ref 0–0.5)
EOSINOPHIL NFR BLD AUTO: 6.2 % (ref 1–4)
ERYTHROCYTE [DISTWIDTH] IN BLOOD BY AUTOMATED COUNT: 12.4 % (ref 11.5–14.5)
GLUCOSE SERPL-MCNC: 100 MG/DL (ref 74–106)
GLUCOSE SERPL-MCNC: 120 MG/DL (ref 70–105)
HBA1C MFR BLD: 5.1 % (ref 4–5.6)
HBV E IGG SER QL IA: NEGATIVE
HBV SURFACE AB SER QL IA: NEGATIVE
HBV SURFACE AB SERPL IA-ACNC: <5 MIU/ML
HCT VFR BLD AUTO: 23 % (ref 40–54)
HGB BLD-MCNC: 7.1 G/DL (ref 13.5–18)
IMM GRANULOCYTES # BLD AUTO: 0.02 K/UL (ref 0–0.04)
IMM GRANULOCYTES NFR BLD: 0.4 % (ref 0–0.4)
LYMPHOCYTES # BLD AUTO: 1.36 K/UL (ref 1–4.8)
LYMPHOCYTES NFR BLD AUTO: 30 % (ref 27–41)
MCH RBC QN AUTO: 31.4 PG (ref 27–31)
MCHC RBC AUTO-ENTMCNC: 30.9 G/DL (ref 32–36)
MCV RBC AUTO: 101.8 FL (ref 80–96)
MONOCYTES # BLD AUTO: 0.42 K/UL (ref 0–0.8)
MONOCYTES NFR BLD AUTO: 9.3 % (ref 2–6)
MPC BLD CALC-MCNC: 13.2 FL (ref 9.4–12.4)
NEUTROPHILS # BLD AUTO: 2.43 K/UL (ref 1.8–7.7)
NEUTROPHILS NFR BLD AUTO: 53.7 % (ref 53–65)
NRBC # BLD AUTO: 0 X10E3/UL
NRBC, AUTO (.00): 0 %
PLATELET # BLD AUTO: 110 K/UL (ref 150–400)
POTASSIUM SERPL-SCNC: 4.1 MMOL/L (ref 3.5–5.1)
RBC # BLD AUTO: 2.26 M/UL (ref 4.6–6.2)
SODIUM SERPL-SCNC: 139 MMOL/L (ref 136–145)
WBC # BLD AUTO: 4.53 K/UL (ref 4.5–11)

## 2023-05-16 PROCEDURE — 99900035 HC TECH TIME PER 15 MIN (STAT)

## 2023-05-16 PROCEDURE — 92507 TX SP LANG VOICE COMM INDIV: CPT

## 2023-05-16 PROCEDURE — 80048 BASIC METABOLIC PNL TOTAL CA: CPT | Performed by: NURSE PRACTITIONER

## 2023-05-16 PROCEDURE — 25000003 PHARM REV CODE 250: Performed by: NURSE PRACTITIONER

## 2023-05-16 PROCEDURE — 11000001 HC ACUTE MED/SURG PRIVATE ROOM

## 2023-05-16 PROCEDURE — 25000003 PHARM REV CODE 250: Performed by: INTERNAL MEDICINE

## 2023-05-16 PROCEDURE — 99233 PR SUBSEQUENT HOSPITAL CARE,LEVL III: ICD-10-PCS | Mod: ,,, | Performed by: INTERNAL MEDICINE

## 2023-05-16 PROCEDURE — 85025 COMPLETE CBC W/AUTO DIFF WBC: CPT | Performed by: NURSE PRACTITIONER

## 2023-05-16 PROCEDURE — 94761 N-INVAS EAR/PLS OXIMETRY MLT: CPT

## 2023-05-16 PROCEDURE — 99233 SBSQ HOSP IP/OBS HIGH 50: CPT | Mod: ,,, | Performed by: INTERNAL MEDICINE

## 2023-05-16 PROCEDURE — 82962 GLUCOSE BLOOD TEST: CPT

## 2023-05-16 PROCEDURE — 80100014 HC HEMODIALYSIS 1:1

## 2023-05-16 RX ORDER — ASPIRIN 81 MG/1
81 TABLET ORAL DAILY
Status: DISCONTINUED | OUTPATIENT
Start: 2023-05-16 | End: 2023-05-21

## 2023-05-16 RX ADMIN — ATORVASTATIN CALCIUM 80 MG: 80 TABLET, FILM COATED ORAL at 08:05

## 2023-05-16 RX ADMIN — POLYETHYLENE GLYCOL 3350 17 G: 17 POWDER, FOR SOLUTION ORAL at 09:05

## 2023-05-16 RX ADMIN — SODIUM CHLORIDE: 9 INJECTION, SOLUTION INTRAVENOUS at 10:05

## 2023-05-16 RX ADMIN — MUPIROCIN: 20 OINTMENT TOPICAL at 08:05

## 2023-05-16 RX ADMIN — MUPIROCIN: 20 OINTMENT TOPICAL at 09:05

## 2023-05-16 RX ADMIN — ASPIRIN 81 MG: 81 TABLET, COATED ORAL at 03:05

## 2023-05-16 NOTE — PROGRESS NOTES
[facility]  Pulmonology  Progress Note    Patient Name: Pardeep Collins  MRN: [id number]  Admission Date: 5/12/2023  Hospital Length of Stay: 2 days  Code Status: Full Code  Attending Provider: Robert Pride DO  Primary Care Provider: Primary Doctor No   Principal Problem: Episode of transient neurologic symptoms    Subjective:     Interval History:  Patient without complaints has difficulty speaking do a stroke      Objective:     Vital Signs (Most Recent):  Temp: 97.7 °F (36.5 °C) (05/15/23 1904)  Pulse: 93 (05/16/23 0134)  Resp: 16 (05/16/23 0134)  BP: (!) 142/73 (05/16/23 0134)  SpO2: 99 % (05/16/23 0134) Vital Signs (24h Range):  Temp:  [97.7 °F (36.5 °C)-98.2 °F (36.8 °C)] 97.7 °F (36.5 °C)  Pulse:  [43-96] 93  Resp:  [11-21] 16  SpO2:  [96 %-100 %] 99 %  BP: (125-172)/(50-98) 142/73     Weight: 56.9 kg (125 lb 7.1 oz)  Body mass index is 19.07 kg/m².      Intake/Output Summary (Last 24 hours) at 5/16/2023 0607  Last data filed at 5/15/2023 1715  Gross per 24 hour   Intake 720 ml   Output 25 ml   Net 695 ml        Physical Exam  Vitals reviewed.   Constitutional:       Appearance: Normal appearance.      Interventions: He is not intubated.  HENT:      Head: Normocephalic and atraumatic.      Nose: Nose normal.      Mouth/Throat:      Mouth: Mucous membranes are dry.      Pharynx: Oropharynx is clear.   Eyes:      Extraocular Movements: Extraocular movements intact.      Conjunctiva/sclera: Conjunctivae normal.      Pupils: Pupils are equal, round, and reactive to light.   Cardiovascular:      Rate and Rhythm: Normal rate.      Heart sounds: Normal heart sounds. No murmur heard.  Pulmonary:      Effort: Pulmonary effort is normal. He is not intubated.      Breath sounds: Normal breath sounds.   Abdominal:      General: Abdomen is flat. Bowel sounds are normal.      Palpations: Abdomen is soft.   Musculoskeletal:         General: Normal range of motion.      Cervical back: Normal range of  motion and neck supple.      Right lower leg: No edema.      Left lower leg: No edema.   Skin:     General: Skin is warm and dry.      Capillary Refill: Capillary refill takes less than 2 seconds.   Neurological:      General: No focal deficit present.      Mental Status: He is alert and oriented to person, place, and time.   Psychiatric:         Mood and Affect: Mood normal.         Behavior: Behavior normal.         Review of Systems    Vents:  Oxygen Concentration (%): 28 (05/13/23 1938)    Lines/Drains/Airways       Drain  Duration                  Hemodialysis AV Fistula 03/20/23 Right upper arm 57 days              Peripheral Intravenous Line  Duration                  Peripheral IV - Single Lumen 05/12/23 18 G Left;Posterior Hand 4 days                    Significant Labs:    CBC/Anemia Profile:  Recent Labs   Lab 05/14/23  0658 05/15/23  0438 05/16/23 0338   WBC 4.61 4.60 4.53   HGB 7.5* 7.7* 7.1*   HCT 24.2* 24.6* 23.0*   * 115* 110*   .1* 102.9* 101.8*   RDW 12.2 12.5 12.4        Chemistries:  Recent Labs   Lab 05/14/23  0658 05/15/23  0438 05/16/23 0338    140 139   K 3.5 3.7 4.1    102 103   CO2 30 29 29   BUN 17 21* 37*   CREATININE 4.09* 5.41* 6.62*   CALCIUM 8.7 8.7 8.4*   ALBUMIN 2.6*  --   --    PHOS 3.8  --   --        Recent Lab Results         05/16/23 0338        Anion Gap 11       Baso # 0.02       Basophil % 0.4       BUN 37       BUN/CREAT RATIO 6       Calcium 8.4       Chloride 103       CO2 29       Creatinine 6.62       Differential Type Auto       eGFR 8       Eos # 0.28       Eosinophil % 6.2       Glucose 100       Hematocrit 23.0       Hemoglobin 7.1       Immature Grans (Abs) 0.02       Immature Granulocytes 0.4       Lymph # 1.36       Lymph % 30.0       MCH 31.4       MCHC 30.9       .8       Mono # 0.42       Mono % 9.3       MPV 13.2       Neutrophils, Abs 2.43       Neutrophils Relative 53.7       nRBC 0.0       NUCLEATED RBC ABSOLUTE 0.00        Platelets 110       Potassium 4.1       RBC 2.26       RDW 12.4       Sodium 139       WBC 4.53               Significant Imaging:  I have reviewed all pertinent imaging results/findings within the past 24 hours.    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  Volume status is stable.  Nephrology following     Oncology  Anemia  Chronic kidney disease  H/H stable at 7/24       Reviewed meds and did med reconciliation and transfer orders           Oscar Diana MD  Pulmonology  [facility]

## 2023-05-16 NOTE — NURSING
Pt transferred to room 550 via bed. He is awake and alert. Pt belongings, including a cane, were taken to room 550 with pt. Also, security strip for pt belongings that were locked up by [facility] security was taken to 5N and strip placed on pt chart. All above reported to STEFANY Gonzalez and STEFANY Tena at bedside.

## 2023-05-16 NOTE — DIALYSIS ROUNDING
The patient was dialyzed today without complication.There was a net fluid removal of 1260 ml during the treatment.    PE  Lungs-clear  Cor-no murmur or rub  Abd-soft    Plan:  Continue the present care

## 2023-05-16 NOTE — PT/OT/SLP PROGRESS
Physical Therapy      Patient Name:  Pardeep Collins   MRN:  [id number]    Patient not seen today secondary to AM - dialysis; PM -  Nurse (Charla Wiley RN)/ LINDA hold. Will follow-up next treatment date.    PT POC discussed with Claudia Talbert DPT

## 2023-05-16 NOTE — ASSESSMENT & PLAN NOTE
Tele neuro consult  S/p TPA  PT/OT/ST   CTA head and neck negative  Will need MRI   Appreciate vascular neurology from [facility] telehealth evaluation felt to have small-vessel stroke really medicine recs are being followed transfer to the floor and work for placement

## 2023-05-16 NOTE — PT/OT/SLP PROGRESS
Occupational Therapy      Patient Name:  Pardeep Collins   MRN:  [id number]    Patient not seen today secondary to Dialysis (OT attempted tx this AM and pt receiving dialysis). Will follow-up 5/17/23.    5/16/2023

## 2023-05-16 NOTE — PLAN OF CARE
SS received consult for swingbed placement. SS spoke with patient's sister, Jazmin. Choice obtained for [facility]. Referral faxed at this time.

## 2023-05-16 NOTE — PT/OT/SLP PROGRESS
Speech Language Pathology Treatment    Patient Name:  Pardeep Collins   MRN:  [id number]  Admitting Diagnosis: Episode of transient neurologic symptoms    Recommendations:                 General Recommendations:  Speech/language therapy  Diet recommendations:  Puree, Liquid Diet Level: Thin   Aspiration Precautions: 1 bite/sip at a time, Alternating bites/sips, Assistance with meals, Check for pocketing/oral residue, Frequent oral care, HOB to 90 degrees, Remain upright 30 minutes post meal, Small bites/sips, and Standard aspiration precautions   General Precautions: Standard,    Communication strategies:   Patient presents with severe dysarthria. Patient seemed to be vocalizing even less today.     Assessment:     Pardeep Collins is a 82 y.o. male with an SLP diagnosis of  CVA .  He presents with right sided facial weakness and severe dysarthria.    Subjective     Patient's nurse (Charla) reported that patient did very well with his breakfast today and even fed himself. She also reported that patient had dialysis today and got sick during it. He vomited and his blood pressure dropped, so then he did not feel like eating his lunch.   Patient goals: not stated     Pain/Comfort:       Respiratory Status: Room air    Objective:     Has the patient been evaluated by SLP for swallowing?   Yes  Keep patient NPO? No   Current Respiratory Status:        Patient completed lingual protrusions, elevations, depressions, and lats with min to mod accuracy after cues/imitation. Patient needed much encouragement today throughout therapy session. He seemed very tired.  Patient completed labial protrusions and retractions with min accuracy after cues/imitation.  Patient utilized dysarthria techniques in  words with only ~ 20% accuracy after cues/imitation. Patient's voice was very weak today and mostly just had a whisper or mouthed his words.     Goals:   Multidisciplinary Problems       SLP Goals          Problem: SLP    Goal Priority  Disciplines Outcome   SLP Goal     SLP Ongoing, Progressing   Description: Patient will complete lingual protrusions, elevations, depressions, and lats with mod-max accuracy Independently.   Patient will complete labial protrusions and retractions with mod-max accuracy Independently.   Patient will utilize dysarthria techniques with 80% accuracy Independently.                           Plan:     Patient to be seen:  5 x/week   Plan of Care expires:  05/28/23  Plan of Care reviewed with:      SLP Follow-Up:  Yes       Discharge recommendations:      Barriers to Discharge:  Decreased Care Giver Support    Time Tracking:     SLP Treatment Date:   05/16/23  Speech Start Time:  1345  Speech Stop Time:  1405     Speech Total Time (min):  20 min    Billable Minutes: Speech Therapy Individual 20    05/16/2023

## 2023-05-16 NOTE — SUBJECTIVE & OBJECTIVE
Interval History:  Patient without complaints has difficulty speaking do a stroke      Objective:     Vital Signs (Most Recent):  Temp: 97.7 °F (36.5 °C) (05/15/23 1904)  Pulse: 93 (05/16/23 0134)  Resp: 16 (05/16/23 0134)  BP: (!) 142/73 (05/16/23 0134)  SpO2: 99 % (05/16/23 0134) Vital Signs (24h Range):  Temp:  [97.7 °F (36.5 °C)-98.2 °F (36.8 °C)] 97.7 °F (36.5 °C)  Pulse:  [43-96] 93  Resp:  [11-21] 16  SpO2:  [96 %-100 %] 99 %  BP: (125-172)/(50-98) 142/73     Weight: 56.9 kg (125 lb 7.1 oz)  Body mass index is 19.07 kg/m².      Intake/Output Summary (Last 24 hours) at 5/16/2023 0607  Last data filed at 5/15/2023 1715  Gross per 24 hour   Intake 720 ml   Output 25 ml   Net 695 ml        Physical Exam  Vitals reviewed.   Constitutional:       Appearance: Normal appearance.      Interventions: He is not intubated.  HENT:      Head: Normocephalic and atraumatic.      Nose: Nose normal.      Mouth/Throat:      Mouth: Mucous membranes are dry.      Pharynx: Oropharynx is clear.   Eyes:      Extraocular Movements: Extraocular movements intact.      Conjunctiva/sclera: Conjunctivae normal.      Pupils: Pupils are equal, round, and reactive to light.   Cardiovascular:      Rate and Rhythm: Normal rate.      Heart sounds: Normal heart sounds. No murmur heard.  Pulmonary:      Effort: Pulmonary effort is normal. He is not intubated.      Breath sounds: Normal breath sounds.   Abdominal:      General: Abdomen is flat. Bowel sounds are normal.      Palpations: Abdomen is soft.   Musculoskeletal:         General: Normal range of motion.      Cervical back: Normal range of motion and neck supple.      Right lower leg: No edema.      Left lower leg: No edema.   Skin:     General: Skin is warm and dry.      Capillary Refill: Capillary refill takes less than 2 seconds.   Neurological:      General: No focal deficit present.      Mental Status: He is alert and oriented to person, place, and time.   Psychiatric:         Mood and  Affect: Mood normal.         Behavior: Behavior normal.         Review of Systems    Vents:  Oxygen Concentration (%): 28 (05/13/23 1938)    Lines/Drains/Airways       Drain  Duration                  Hemodialysis AV Fistula 03/20/23 Right upper arm 57 days              Peripheral Intravenous Line  Duration                  Peripheral IV - Single Lumen 05/12/23 18 G Left;Posterior Hand 4 days                    Significant Labs:    CBC/Anemia Profile:  Recent Labs   Lab 05/14/23  0658 05/15/23  0438 05/16/23  0338   WBC 4.61 4.60 4.53   HGB 7.5* 7.7* 7.1*   HCT 24.2* 24.6* 23.0*   * 115* 110*   .1* 102.9* 101.8*   RDW 12.2 12.5 12.4        Chemistries:  Recent Labs   Lab 05/14/23 0658 05/15/23  0438 05/16/23  0338    140 139   K 3.5 3.7 4.1    102 103   CO2 30 29 29   BUN 17 21* 37*   CREATININE 4.09* 5.41* 6.62*   CALCIUM 8.7 8.7 8.4*   ALBUMIN 2.6*  --   --    PHOS 3.8  --   --        Recent Lab Results         05/16/23 0338        Anion Gap 11       Baso # 0.02       Basophil % 0.4       BUN 37       BUN/CREAT RATIO 6       Calcium 8.4       Chloride 103       CO2 29       Creatinine 6.62       Differential Type Auto       eGFR 8       Eos # 0.28       Eosinophil % 6.2       Glucose 100       Hematocrit 23.0       Hemoglobin 7.1       Immature Grans (Abs) 0.02       Immature Granulocytes 0.4       Lymph # 1.36       Lymph % 30.0       MCH 31.4       MCHC 30.9       .8       Mono # 0.42       Mono % 9.3       MPV 13.2       Neutrophils, Abs 2.43       Neutrophils Relative 53.7       nRBC 0.0       NUCLEATED RBC ABSOLUTE 0.00       Platelets 110       Potassium 4.1       RBC 2.26       RDW 12.4       Sodium 139       WBC 4.53               Significant Imaging:  I have reviewed all pertinent imaging results/findings within the past 24 hours.

## 2023-05-17 LAB
ANION GAP SERPL CALCULATED.3IONS-SCNC: 11 MMOL/L (ref 7–16)
BASOPHILS # BLD AUTO: 0.02 K/UL (ref 0–0.2)
BASOPHILS NFR BLD AUTO: 0.3 % (ref 0–1)
BUN SERPL-MCNC: 22 MG/DL (ref 7–18)
BUN/CREAT SERPL: 5 (ref 6–20)
CALCIUM SERPL-MCNC: 8.7 MG/DL (ref 8.5–10.1)
CHLORIDE SERPL-SCNC: 100 MMOL/L (ref 98–107)
CO2 SERPL-SCNC: 30 MMOL/L (ref 21–32)
CREAT SERPL-MCNC: 4.46 MG/DL (ref 0.7–1.3)
DIFFERENTIAL METHOD BLD: ABNORMAL
EGFR (NO RACE VARIABLE) (RUSH/TITUS): 13 ML/MIN/1.73M2
EOSINOPHIL # BLD AUTO: 0.14 K/UL (ref 0–0.5)
EOSINOPHIL NFR BLD AUTO: 2 % (ref 1–4)
ERYTHROCYTE [DISTWIDTH] IN BLOOD BY AUTOMATED COUNT: 12.5 % (ref 11.5–14.5)
GLUCOSE SERPL-MCNC: 129 MG/DL (ref 70–105)
GLUCOSE SERPL-MCNC: 89 MG/DL (ref 74–106)
HCT VFR BLD AUTO: 22.4 % (ref 40–54)
HGB BLD-MCNC: 7.2 G/DL (ref 13.5–18)
IMM GRANULOCYTES # BLD AUTO: 0.02 K/UL (ref 0–0.04)
IMM GRANULOCYTES NFR BLD: 0.3 % (ref 0–0.4)
LYMPHOCYTES # BLD AUTO: 1.34 K/UL (ref 1–4.8)
LYMPHOCYTES NFR BLD AUTO: 19.6 % (ref 27–41)
MCH RBC QN AUTO: 32.3 PG (ref 27–31)
MCHC RBC AUTO-ENTMCNC: 32.1 G/DL (ref 32–36)
MCV RBC AUTO: 100.4 FL (ref 80–96)
MONOCYTES # BLD AUTO: 0.51 K/UL (ref 0–0.8)
MONOCYTES NFR BLD AUTO: 7.5 % (ref 2–6)
MPC BLD CALC-MCNC: 14 FL (ref 9.4–12.4)
NEUTROPHILS # BLD AUTO: 4.8 K/UL (ref 1.8–7.7)
NEUTROPHILS NFR BLD AUTO: 70.3 % (ref 53–65)
NRBC # BLD AUTO: 0 X10E3/UL
NRBC, AUTO (.00): 0 %
PLATELET # BLD AUTO: 109 K/UL (ref 150–400)
POTASSIUM SERPL-SCNC: 4 MMOL/L (ref 3.5–5.1)
RBC # BLD AUTO: 2.23 M/UL (ref 4.6–6.2)
SODIUM SERPL-SCNC: 137 MMOL/L (ref 136–145)
WBC # BLD AUTO: 6.83 K/UL (ref 4.5–11)

## 2023-05-17 PROCEDURE — 99233 SBSQ HOSP IP/OBS HIGH 50: CPT | Mod: ,,, | Performed by: STUDENT IN AN ORGANIZED HEALTH CARE EDUCATION/TRAINING PROGRAM

## 2023-05-17 PROCEDURE — 99233 PR SUBSEQUENT HOSPITAL CARE,LEVL III: ICD-10-PCS | Mod: ,,, | Performed by: STUDENT IN AN ORGANIZED HEALTH CARE EDUCATION/TRAINING PROGRAM

## 2023-05-17 PROCEDURE — 97530 THERAPEUTIC ACTIVITIES: CPT

## 2023-05-17 PROCEDURE — 92507 TX SP LANG VOICE COMM INDIV: CPT

## 2023-05-17 PROCEDURE — 25000003 PHARM REV CODE 250: Performed by: INTERNAL MEDICINE

## 2023-05-17 PROCEDURE — 82962 GLUCOSE BLOOD TEST: CPT

## 2023-05-17 PROCEDURE — 11000001 HC ACUTE MED/SURG PRIVATE ROOM

## 2023-05-17 PROCEDURE — 97110 THERAPEUTIC EXERCISES: CPT

## 2023-05-17 PROCEDURE — 80048 BASIC METABOLIC PNL TOTAL CA: CPT | Performed by: INTERNAL MEDICINE

## 2023-05-17 PROCEDURE — 85025 COMPLETE CBC W/AUTO DIFF WBC: CPT | Performed by: INTERNAL MEDICINE

## 2023-05-17 RX ADMIN — ATORVASTATIN CALCIUM 80 MG: 80 TABLET, FILM COATED ORAL at 08:05

## 2023-05-17 RX ADMIN — MUPIROCIN: 20 OINTMENT TOPICAL at 08:05

## 2023-05-17 NOTE — PROGRESS NOTES
[facility]  Nephrology  Progress Note    Patient Name: Pardeep Collins  MRN: [id number]  Admission Date: 5/12/2023  Hospital Length of Stay: 3 days  Attending Provider: Robert Pride DO   Primary Care Physician: Primary Doctor No  Principal Problem:Episode of transient neurologic symptoms    Consults  Subjective:     Interval History: The patient was not dialyzed today because of difficulties in cannulating his angioaccess.    Review of patient's allergies indicates:  No Known Allergies  Current Facility-Administered Medications   Medication Frequency    0.9%  NaCl infusion PRN    acetaminophen tablet 650 mg Q4H PRN    aspirin EC tablet 81 mg Daily    atorvastatin tablet 80 mg QHS    docusate sodium capsule 100 mg BID PRN    HYDROcodone-acetaminophen 5-325 mg per tablet 1 tablet Q6H PRN    labetaloL injection 10 mg Q4H PRN    mupirocin 2 % ointment BID    naloxone 0.4 mg/mL injection 0.02 mg PRN    ondansetron injection 4 mg Q8H PRN    polyethylene glycol packet 17 g Daily    sodium chloride 0.9% flush 10 mL Q12H PRN       Objective:     Vital Signs (Most Recent):  Temp: 98.7 °F (37.1 °C) (05/17/23 1600)  Pulse: 94 (05/17/23 1600)  Resp: 19 (05/17/23 1600)  BP: 136/67 (05/17/23 1600)  SpO2: 99 % (05/17/23 1600) Vital Signs (24h Range):  Temp:  [98.3 °F (36.8 °C)-100 °F (37.8 °C)] 98.7 °F (37.1 °C)  Pulse:  [] 94  Resp:  [16-20] 19  SpO2:  [95 %-99 %] 99 %  BP: (109-164)/(61-89) 136/67     Weight: 56.9 kg (125 lb 7.1 oz) (05/15/23 0243)  Body mass index is 19.07 kg/m².  Body surface area is 1.65 meters squared.    I/O last 3 completed shifts:  In: 260 [P.O.:260]  Out: 1460 [Urine:300; Emesis/NG output:1; Other:1159]    Physical Exam  Lungs-clear  Cor-1/6 systolic murmur  Abd-soft,  Ext-no edema    Significant Labs:sureCBC:   Recent Labs   Lab 05/17/23  0216   WBC 6.83   RBC 2.23*   HGB 7.2*   HCT 22.4*   *   .4*   MCH 32.3*   MCHC 32.1     CMP:   Recent Labs    Lab 05/12/23 2000 05/13/23 0527 05/14/23  0658 05/15/23  0438 05/17/23  0216   *   < > 96   < > 89   CALCIUM 8.6   < > 8.7   < > 8.7   ALBUMIN 2.9*   < > 2.6*  --   --    PROT 6.9  --   --   --   --    *   < > 138   < > 137   K 3.4*   < > 3.5   < > 4.0   CO2 32   < > 30   < > 30   CL 95*   < > 101   < > 100   BUN 15   < > 17   < > 22*   CREATININE 4.51*   < > 4.09*   < > 4.46*   ALKPHOS 63  --   --   --   --    ALT 13*  --   --   --   --    AST 11*  --   --   --   --    BILITOT 0.6  --   --   --   --     < > = values in this interval not displayed.     All labs within the past 24 hours have been reviewed.    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 I will follow-up with patient. Please contact us if you have any additional questions.    Flaquito Boykin MD  Nephrology  [facility]

## 2023-05-17 NOTE — PROGRESS NOTES
[facility] Hospital Medicine  Progress Note    Patient Name: Pardeep Collins  MRN: [id number]  Patient Class: IP- Inpatient   Admission Date: 5/12/2023  Length of Stay: 3 days  Attending Physician: Robert Pride DO  Primary Care Provider: Primary Doctor No        Subjective:     Principal Problem:Episode of transient neurologic symptoms        HPI:  Patient is an 82-year-old male with a history of essential hypertension and ESRD on HD on TTS with associated anemia who presents to the emergency room with transient episodes of slurred speech and right-sided weakness.  Patient stated that he was in his usual state of health until the today around 11:00 a.m. when he suddenly experienced slurred speech and right-sided weakness.  He stated that these symptoms lasted about 5 minute with complete resolution and did not think much of it when he experienced same symptoms at around 2:00 p.m. which lasted about 10 minutes again with complete resolution without any intervention.  Patient was subsequently brought in by EMS for evaluation.    On presentation, vital signs were stable and patient was afebrile.  Physical examination revealed NIHSS of 0 points.  Ensuing workup including CT of head was unremarkable.  Tele neurologist was consulted who recommended CTA of the head and neck and provided that there is no evidence of LVO, patient needs be started on DAPT with loading dose of Plavix and high-intensity statin.  Patient will be admitted for further evaluation and intervention.      Overview/Hospital Course:  5/17-chart reviewed, admitted for TIA then worsened symptoms s/p TNK without improvement.  Plan now for PT/OT and placement      Interval History: naeo    Review of Systems   Reason unable to perform ROS: unable to talke, denies complaints with head shaking.   Objective:     Vital Signs (Most Recent):  Temp: 100 °F (37.8 °C) (05/17/23 0710)  Pulse: 88 (05/17/23 0710)  Resp: 19 (05/17/23  0710)  BP: 123/65 (05/17/23 0710)  SpO2: 99 % (05/17/23 0710) Vital Signs (24h Range):  Temp:  [97.8 °F (36.6 °C)-100 °F (37.8 °C)] 100 °F (37.8 °C)  Pulse:  [] 88  Resp:  [11-24] 19  SpO2:  [70 %-100 %] 99 %  BP: ()/(33-89) 123/65     Weight: 56.9 kg (125 lb 7.1 oz)  Body mass index is 19.07 kg/m².    Intake/Output Summary (Last 24 hours) at 5/17/2023 0949  Last data filed at 5/17/2023 0604  Gross per 24 hour   Intake 0 ml   Output 1360 ml   Net -1360 ml         Physical Exam  Vitals reviewed.   Constitutional:       Appearance: Normal appearance.   HENT:      Head: Normocephalic and atraumatic.      Nose: Nose normal.   Eyes:      Conjunctiva/sclera: Conjunctivae normal.   Neck:      Trachea: Trachea normal.   Cardiovascular:      Rate and Rhythm: Normal rate and regular rhythm.      Pulses: Normal pulses.      Heart sounds: Normal heart sounds.   Pulmonary:      Effort: Pulmonary effort is normal.      Breath sounds: Normal breath sounds and air entry.   Abdominal:      General: Bowel sounds are normal.      Palpations: Abdomen is soft.   Musculoskeletal:      Right lower leg: No edema.      Left lower leg: No edema.   Skin:     General: Skin is warm and dry.   Neurological:      Mental Status: He is alert. Mental status is at baseline.      Cranial Nerves: Cranial nerves 2-12 are intact.      Comments: Grossly normal motor and sensory function without focal deficit appreciated.   Psychiatric:         Mood and Affect: Affect normal.         Behavior: Behavior is cooperative.      Comments: Non verbal           Significant Labs: All pertinent labs within the past 24 hours have been reviewed.    Significant Imaging: I have reviewed all pertinent imaging results/findings within the past 24 hours.      Assessment/Plan:      * Episode of transient neurologic symptoms    Patient 2 episodes of transient neurological deficits including symptoms of dysarthria and right-sided weakness.  When patient arrived in  ED patient had NIHSS of 0.  Initial workup including CT of the head was unremarkable.  Tele neurologist recommended CTA of head and neck and provided that there is no evidence of LVO, patient will be started on DAPT with loading dose of Plavix and a high-intensity statin.  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  Patient's hemoglobin stable from his baseline      Primary hypertension    Adjust meds as needed        ESRD (end stage renal disease)    Patient is under the care of Dr. Aceves and dialyzes on Tuesdays Thursdays and Saturday.  Will consult Dr. Aceves        VTE Risk Mitigation (From admission, onward)         Ordered     IP VTE HIGH RISK PATIENT  Once         05/13/23 0041     Place sequential compression device  Until discontinued         05/13/23 0041                Discharge Planning   RASHAD:      Code Status: Full Code   Is the patient medically ready for discharge?:     Reason for patient still in hospital (select all that apply): Treatment  Discharge Plan A: Home, Home Health                  Robert Pride DO  Department of Hospital Medicine   [facility]

## 2023-05-17 NOTE — ASSESSMENT & PLAN NOTE
Patient 2 episodes of transient neurological deficits including symptoms of dysarthria and right-sided weakness.  When patient arrived in ED patient had NIHSS of 0.  Initial workup including CT of the head was unremarkable.  Tele neurologist recommended CTA of head and neck and provided that there is no evidence of LVO, patient will be started on DAPT with loading dose of Plavix and a high-intensity statin.  Will also proceed with MRI of brain and echocardiogram    Completed stroke, PT/OT rehab

## 2023-05-17 NOTE — PT/OT/SLP PROGRESS
Speech Language Pathology Treatment    Patient Name:  Pardeep Collins   MRN:  [id number]  Admitting Diagnosis: Episode of transient neurologic symptoms    Recommendations:                 General Recommendations:  Speech/language therapy  Diet recommendations:  Puree, Liquid Diet Level: Thin   Aspiration Precautions: 1 bite/sip at a time, Alternating bites/sips, Assistance with meals, Check for pocketing/oral residue, Frequent oral care, HOB to 90 degrees, Remain upright 30 minutes post meal, Small bites/sips, and Standard aspiration precautions   General Precautions: Standard,    Communication strategies:   Patient presents with severe dysarthria. Patient seemed to be vocalizing even less today.     Assessment:     Pardeep Collins is a 82 y.o. male with an SLP diagnosis of  CVA .  He presents with right sided facial weakness and severe dysarthria.    Subjective   Patient was moved to the floor yesterday from [facility]. Patient very somber this morning and seems depressed. Patient did not eat breakfast but did not want anything else when I offered to get it. Patient agreeable to do SPEECH THERAPY.  Patient goals: not stated     Pain/Comfort:       Respiratory Status: Room air    Objective:     Has the patient been evaluated by SLP for swallowing?   Yes  Keep patient NPO? No   Current Respiratory Status:        Patient completed lingual protrusions, elevations, depressions, and lats with min to mod accuracy after cues/imitation. Patient again needed much encouragement today throughout therapy session.   Patient completed labial protrusions and retractions with min accuracy after cues/imitation.  Patient utilized dysarthria techniques in  words with only ~ 15-20% accuracy after cues/imitation. Patient's voice was very weak but tried to use it more when he was encouraged.     Goals:   Multidisciplinary Problems       SLP Goals          Problem: SLP    Goal Priority Disciplines Outcome   SLP Goal     SLP Ongoing, Progressing    Description: Patient will complete lingual protrusions, elevations, depressions, and lats with mod-max accuracy Independently.   Patient will complete labial protrusions and retractions with mod-max accuracy Independently.   Patient will utilize dysarthria techniques with 80% accuracy Independently.                           Plan:     Patient to be seen:  5 x/week   Plan of Care expires:  05/28/23  Plan of Care reviewed with:      SLP Follow-Up:  Yes       Discharge recommendations:      Barriers to Discharge:  Decreased Care Giver Support    Time Tracking:     SLP Treatment Date:   05/17/23  Speech Start Time:  0900  Speech Stop Time:  0920     Speech Total Time (min):  20 min    Billable Minutes: Speech Therapy Individual 20    05/17/2023

## 2023-05-17 NOTE — PLAN OF CARE
1038 SS spoke with Aminata at [facility]. They do not accept patient's insurance. SS attempted to contact patient's sister, Jazmin, no answer, left message. SS spoke with patient's daughter. She is going to review options and call me back with new choice.     1439 SS spoke with patient's daughter, Elida. Choice obtained for [facility] in [place]. Referral faxed at this time.     1533 SS received call back from patient's daughter, Elida. They have changed their mind and would like referral to be sent to [facility]. Referral faxed at this time. Referral to [facility] cancelled.

## 2023-05-17 NOTE — SUBJECTIVE & OBJECTIVE
Interval History: naeo    Review of Systems   Reason unable to perform ROS: unable to talke, denies complaints with head shaking.   Objective:     Vital Signs (Most Recent):  Temp: 100 °F (37.8 °C) (05/17/23 0710)  Pulse: 88 (05/17/23 0710)  Resp: 19 (05/17/23 0710)  BP: 123/65 (05/17/23 0710)  SpO2: 99 % (05/17/23 0710) Vital Signs (24h Range):  Temp:  [97.8 °F (36.6 °C)-100 °F (37.8 °C)] 100 °F (37.8 °C)  Pulse:  [] 88  Resp:  [11-24] 19  SpO2:  [70 %-100 %] 99 %  BP: ()/(33-89) 123/65     Weight: 56.9 kg (125 lb 7.1 oz)  Body mass index is 19.07 kg/m².    Intake/Output Summary (Last 24 hours) at 5/17/2023 0949  Last data filed at 5/17/2023 0604  Gross per 24 hour   Intake 0 ml   Output 1360 ml   Net -1360 ml         Physical Exam  Vitals reviewed.   Constitutional:       Appearance: Normal appearance.   HENT:      Head: Normocephalic and atraumatic.      Nose: Nose normal.   Eyes:      Conjunctiva/sclera: Conjunctivae normal.   Neck:      Trachea: Trachea normal.   Cardiovascular:      Rate and Rhythm: Normal rate and regular rhythm.      Pulses: Normal pulses.      Heart sounds: Normal heart sounds.   Pulmonary:      Effort: Pulmonary effort is normal.      Breath sounds: Normal breath sounds and air entry.   Abdominal:      General: Bowel sounds are normal.      Palpations: Abdomen is soft.   Musculoskeletal:      Right lower leg: No edema.      Left lower leg: No edema.   Skin:     General: Skin is warm and dry.   Neurological:      Mental Status: He is alert. Mental status is at baseline.      Cranial Nerves: Cranial nerves 2-12 are intact.      Comments: Grossly normal motor and sensory function without focal deficit appreciated.   Psychiatric:         Mood and Affect: Affect normal.         Behavior: Behavior is cooperative.      Comments: Non verbal           Significant Labs: All pertinent labs within the past 24 hours have been reviewed.    Significant Imaging: I have reviewed all pertinent  imaging results/findings within the past 24 hours.

## 2023-05-17 NOTE — PT/OT/SLP PROGRESS
Physical Therapy Treatment    Patient Name:  Pardeep Collins   MRN:  [id number]    Recommendations:     Discharge Recommendations: nursing facility, skilled, rehabilitation facility  Discharge Equipment Recommendations: other (see comments) (to be determinied)  Barriers to discharge: Inaccessible home and Decreased caregiver support    Assessment:     Pardeep Collins is a 82 y.o. male admitted with a medical diagnosis of Episode of transient neurologic symptoms.  He presents with the following impairments/functional limitations: weakness, impaired functional mobility, gait instability, impaired balance, decreased coordination, decreased upper extremity function, decreased lower extremity function, impaired coordination Pt presents with right hemiplegia. He demonstrates occasional use of RLE and is able to stand with assistance. Pt will need significant assistance at d/c.    Rehab Prognosis: Fair; patient would benefit from acute skilled PT services to address these deficits and reach maximum level of function.    Recent Surgery: * No surgery found *      Plan:     During this hospitalization, patient to be seen 5 x/week to address the identified rehab impairments via gait training, therapeutic activities, therapeutic exercises, neuromuscular re-education, wheelchair management/training and progress toward the following goals:    Plan of Care Expires:  06/15/23    Subjective     Chief Complaint: right hemiplegia  Patient/Family Comments/goals: Pt is agreeable to PT  Pain/Comfort:  Pain Rating 1: 0/10  Pain Rating Post-Intervention 1: 0/10      Objective:     Communicated with CIARAN Conroy LPN prior to session.  Patient found supine with peripheral IV upon PT entry to room.     General Precautions: Standard, fall  Orthopedic Precautions: N/A  Braces: N/A  Respiratory Status: Room air     Functional Mobility:  Bed Mobility:     Rolling Right: minimum assistance  Scooting: moderate assistance  Supine to Sit: moderate  assistance  Sit to Supine: moderate assistance and of 2 persons  Transfers:     Sit to Stand:  moderate assistance with quad cane  Balance: poor, falls to right       AM-PAC 6 CLICK MOBILITY  Turning over in bed (including adjusting bedclothes, sheets and blankets)?: 3  Sitting down on and standing up from a chair with arms (e.g., wheelchair, bedside commode, etc.): 3  Moving from lying on back to sitting on the side of the bed?: 2  Moving to and from a bed to a chair (including a wheelchair)?: 2  Need to walk in hospital room?: 2  Climbing 3-5 steps with a railing?: 1  Basic Mobility Total Score: 13       Treatment & Education:  Pt performed AAROM bilateral LE: ankle pumps, heel slides, hip abduction/adduction, and Long arc quads x 20 each  Moderate assistance to sit at edge of bed   Edge of bed x 20 minutes with minimal assistance to moderate assistance for correction of trunk when balance was lost to right side  Sit to stand x2 trials with moderate assistance using quad cane, lateral weight shifts in standing      Patient left HOB elevated with all lines intact and call button in reach..    GOALS:   Multidisciplinary Problems       Physical Therapy Goals          Problem: Physical Therapy    Goal Priority Disciplines Outcome Goal Variances Interventions   Physical Therapy Goal     PT, PT/OT Ongoing, Progressing     Description: Short term goals:  1. Supine to sit with MInimal Assistance  2. Sit to stand transfer with Minimal Assistance  3. Bed to chair transfer with Minimal Assistance using Rolling Walker  4. Gait  x 25 feet with Minimal Assistance using Rolling Walker.     Long term goals:  1. Supine to sit with Contact Guard Assistance  2. Sit to stand transfer with Contact Guard Assistance  3. Bed to chair transfer with Contact Guard Assistance using Rolling Walker  4. Gait  x 50 feet with Contact Guard Assistance using Rolling Walker.                         Time Tracking:     PT Received On: 05/17/23  PT  Start Time: 1414     PT Stop Time: 1454  PT Total Time (min): 40 min     Billable Minutes: Therapeutic Activity 20 and Therapeutic Exercise 15    Treatment Type: Treatment  PT/PTA: PT     Number of PTA visits since last PT visit: 0     05/17/2023

## 2023-05-17 NOTE — HOSPITAL COURSE
5/17-chart reviewed, admitted for TIA then worsened symptoms s/p TNK without improvement.  Plan now for PT/OT and placement  5/18-av fistula bled last night, required transfusion today.  Surgery consulted  5/19-need to see his hb remain stable after transfusion prior to discharge.   5/20-follow HB  5/21-having issues with dialysis access. No urgent indications to dialyze as far as I can tell. Dr. Boykin following. Will get surgery to evaluate access    5/22-Hb stable, issues with AV fistula access have resolved. He is stable for DC to [facility]. Resume OP dialysis

## 2023-05-18 LAB
ABO + RH BLD: NORMAL
ABORH RETYPE: NORMAL
ANION GAP SERPL CALCULATED.3IONS-SCNC: 13 MMOL/L (ref 7–16)
BASOPHILS # BLD AUTO: 0.02 K/UL (ref 0–0.2)
BASOPHILS NFR BLD AUTO: 0.4 % (ref 0–1)
BILIRUB SERPL-MCNC: 0.4 MG/DL (ref ?–1.2)
BLD PROD TYP BPU: NORMAL
BLOOD UNIT EXPIRATION DATE: NORMAL
BLOOD UNIT TYPE CODE: 5100
BUN SERPL-MCNC: 38 MG/DL (ref 7–18)
BUN/CREAT SERPL: 6 (ref 6–20)
CALCIUM SERPL-MCNC: 8.1 MG/DL (ref 8.5–10.1)
CHLORIDE SERPL-SCNC: 102 MMOL/L (ref 98–107)
CO2 SERPL-SCNC: 28 MMOL/L (ref 21–32)
CREAT SERPL-MCNC: 5.95 MG/DL (ref 0.7–1.3)
CROSSMATCH INTERPRETATION: NORMAL
DIFFERENTIAL METHOD BLD: ABNORMAL
DISPENSE STATUS: NORMAL
EGFR (NO RACE VARIABLE) (RUSH/TITUS): 9 ML/MIN/1.73M2
EOSINOPHIL # BLD AUTO: 0.23 K/UL (ref 0–0.5)
EOSINOPHIL NFR BLD AUTO: 4.9 % (ref 1–4)
ERYTHROCYTE [DISTWIDTH] IN BLOOD BY AUTOMATED COUNT: 12.3 % (ref 11.5–14.5)
GLUCOSE SERPL-MCNC: 86 MG/DL (ref 74–106)
HAPTOGLOB SERPL NEPH-MCNC: 113 MG/DL (ref 30–200)
HCT VFR BLD AUTO: 21 % (ref 40–54)
HGB BLD-MCNC: 6.8 G/DL (ref 13.5–18)
IMM GRANULOCYTES # BLD AUTO: 0.01 K/UL (ref 0–0.04)
IMM GRANULOCYTES NFR BLD: 0.2 % (ref 0–0.4)
INDIRECT COOMBS: NORMAL
LDH SERPL-CCNC: 134 U/L (ref 87–241)
LYMPHOCYTES # BLD AUTO: 1.24 K/UL (ref 1–4.8)
LYMPHOCYTES NFR BLD AUTO: 26.3 % (ref 27–41)
MCH RBC QN AUTO: 32.1 PG (ref 27–31)
MCHC RBC AUTO-ENTMCNC: 32.4 G/DL (ref 32–36)
MCV RBC AUTO: 99.1 FL (ref 80–96)
MONOCYTES # BLD AUTO: 0.42 K/UL (ref 0–0.8)
MONOCYTES NFR BLD AUTO: 8.9 % (ref 2–6)
MPC BLD CALC-MCNC: 13.3 FL (ref 9.4–12.4)
NEUTROPHILS # BLD AUTO: 2.8 K/UL (ref 1.8–7.7)
NEUTROPHILS NFR BLD AUTO: 59.3 % (ref 53–65)
NRBC # BLD AUTO: 0 X10E3/UL
NRBC, AUTO (.00): 0 %
PLATELET # BLD AUTO: 107 K/UL (ref 150–400)
POTASSIUM SERPL-SCNC: 4.1 MMOL/L (ref 3.5–5.1)
RBC # BLD AUTO: 2.12 M/UL (ref 4.6–6.2)
RH BLD: NORMAL
SODIUM SERPL-SCNC: 139 MMOL/L (ref 136–145)
SPECIMEN OUTDATE: NORMAL
UNIT NUMBER: NORMAL
WBC # BLD AUTO: 4.72 K/UL (ref 4.5–11)

## 2023-05-18 PROCEDURE — 83010 ASSAY OF HAPTOGLOBIN QUANT: CPT | Performed by: STUDENT IN AN ORGANIZED HEALTH CARE EDUCATION/TRAINING PROGRAM

## 2023-05-18 PROCEDURE — 99232 SBSQ HOSP IP/OBS MODERATE 35: CPT | Mod: ,,, | Performed by: STUDENT IN AN ORGANIZED HEALTH CARE EDUCATION/TRAINING PROGRAM

## 2023-05-18 PROCEDURE — 86900 BLOOD TYPING SEROLOGIC ABO: CPT | Performed by: STUDENT IN AN ORGANIZED HEALTH CARE EDUCATION/TRAINING PROGRAM

## 2023-05-18 PROCEDURE — 99232 PR SUBSEQUENT HOSPITAL CARE,LEVL II: ICD-10-PCS | Mod: ,,, | Performed by: STUDENT IN AN ORGANIZED HEALTH CARE EDUCATION/TRAINING PROGRAM

## 2023-05-18 PROCEDURE — 85025 COMPLETE CBC W/AUTO DIFF WBC: CPT | Performed by: INTERNAL MEDICINE

## 2023-05-18 PROCEDURE — 97110 THERAPEUTIC EXERCISES: CPT | Mod: CQ

## 2023-05-18 PROCEDURE — 25000003 PHARM REV CODE 250: Performed by: INTERNAL MEDICINE

## 2023-05-18 PROCEDURE — P9016 RBC LEUKOCYTES REDUCED: HCPCS | Performed by: STUDENT IN AN ORGANIZED HEALTH CARE EDUCATION/TRAINING PROGRAM

## 2023-05-18 PROCEDURE — 36430 TRANSFUSION BLD/BLD COMPNT: CPT

## 2023-05-18 PROCEDURE — 82247 BILIRUBIN TOTAL: CPT | Performed by: STUDENT IN AN ORGANIZED HEALTH CARE EDUCATION/TRAINING PROGRAM

## 2023-05-18 PROCEDURE — 83615 LACTATE (LD) (LDH) ENZYME: CPT | Performed by: STUDENT IN AN ORGANIZED HEALTH CARE EDUCATION/TRAINING PROGRAM

## 2023-05-18 PROCEDURE — 86923 COMPATIBILITY TEST ELECTRIC: CPT | Performed by: STUDENT IN AN ORGANIZED HEALTH CARE EDUCATION/TRAINING PROGRAM

## 2023-05-18 PROCEDURE — 80048 BASIC METABOLIC PNL TOTAL CA: CPT | Performed by: INTERNAL MEDICINE

## 2023-05-18 PROCEDURE — 97530 THERAPEUTIC ACTIVITIES: CPT | Mod: CQ

## 2023-05-18 PROCEDURE — 11000001 HC ACUTE MED/SURG PRIVATE ROOM

## 2023-05-18 PROCEDURE — 94761 N-INVAS EAR/PLS OXIMETRY MLT: CPT

## 2023-05-18 PROCEDURE — 90935 HEMODIALYSIS ONE EVALUATION: CPT

## 2023-05-18 RX ORDER — HYDROCODONE BITARTRATE AND ACETAMINOPHEN 500; 5 MG/1; MG/1
TABLET ORAL
Status: DISCONTINUED | OUTPATIENT
Start: 2023-05-18 | End: 2023-05-22 | Stop reason: HOSPADM

## 2023-05-18 RX ADMIN — MUPIROCIN: 20 OINTMENT TOPICAL at 09:05

## 2023-05-18 RX ADMIN — ATORVASTATIN CALCIUM 80 MG: 80 TABLET, FILM COATED ORAL at 09:05

## 2023-05-18 NOTE — PT/OT/SLP PROGRESS
Speech Language Pathology      Pardeep Collins  MRN: [id number]    Patient not seen today secondary to gone to dialysis this afternoon. Patient also refused therapy this morning @ 1138. Will follow-up 5/19/23.

## 2023-05-18 NOTE — HPI
General surgery consult for bleeding versus difficult access of right AV fistula.  Palpable thrill throughout fistula without hematoma.  Likely had bleeding with access, but fistula appears to be functioning as it should.

## 2023-05-18 NOTE — NURSING
Notified Dr Boykin that Dr Sierra came to see pt and determined that pt did not need to have surgery on his Right AV graft. Dr Boykin and Dr Sierra was ok with pt going to dialysis today pt is no longer NPO.

## 2023-05-18 NOTE — PLAN OF CARE
SS spoke with Cherri at [facility]. Patient has been accepted and his insurance has approved him for [facility]. SS discussed with Dr. Pride. Patient has a bleed at his fistula site and is not ready for discharge. SS notified Cherri at [facility] and she will follow. Swingbed auth is good through 5/22/23. Patient's daughter, Elida, notified of acceptance to [facility].

## 2023-05-18 NOTE — PT/OT/SLP PROGRESS
Physical Therapy Treatment    Patient Name:  Pardeep Collins   MRN:  [id number]    Recommendations:     Discharge Recommendations: nursing facility, skilled, rehabilitation facility  Discharge Equipment Recommendations: other (see comments) (to be determined)  Barriers to discharge:  ongoing medical treatment    Assessment:     Pardeep Collins is a 82 y.o. male admitted with a medical diagnosis of Episode of transient neurologic symptoms.  He presents with the following impairments/functional limitations: weakness, impaired endurance, impaired self care skills, impaired functional mobility, decreased lower extremity function.    Pt participated well with PT requiring decreased assist with supine to sit as compared to previous treatment session. Pt demo no active movement R LE. Sit to stand trials deferred due to decreased H&H and pt schedule to receive blood    Rehab Prognosis: Fair; patient would benefit from acute skilled PT services to address these deficits and reach maximum level of function.    Recent Surgery: * No surgery found *      Plan:     During this hospitalization, patient to be seen 5 x/week to address the identified rehab impairments via gait training, therapeutic activities, therapeutic exercises, neuromuscular re-education and progress toward the following goals:    Plan of Care Expires:  06/15/23    Subjective     Chief Complaint: right hemiplegia  Patient/Family Comments/goals: pt nodding agreement to PT  Pain/Comfort:         Objective:     Communicated with Carlos Conroy LPN prior to session.  Patient found HOB elevated with peripheral IV upon PT entry to room.     General Precautions: Standard, fall  Orthopedic Precautions: N/A  Braces: N/A  Respiratory Status: Room air     Functional Mobility:  Bed Mobility:     Supine to Sit: minimum assistance and moderate assistance  Sit to Supine: minimum assistance and of 2 persons      AM-PAC 6 CLICK MOBILITY  Turning over in bed (including adjusting  bedclothes, sheets and blankets)?: 3  Sitting down on and standing up from a chair with arms (e.g., wheelchair, bedside commode, etc.): 3  Moving from lying on back to sitting on the side of the bed?: 2  Moving to and from a bed to a chair (including a wheelchair)?: 1  Need to walk in hospital room?: 1  Climbing 3-5 steps with a railing?: 1  Basic Mobility Total Score: 11       Treatment & Education:  L LE exercise x 30 reps - long arc quad, quad set, glut set, straight leg raise, hip ab/adduction, heel slide, ap with active assist    PROM R LE    Minimum assist sitting EOB x 10 minutes with L hand holding onto bed rail focusing on maintaining balance/midline positioning; occasionally leaning on L elbow with no LOB       Patient left HOB elevated with all lines intact and call button in reach..    GOALS:   Multidisciplinary Problems       Physical Therapy Goals          Problem: Physical Therapy    Goal Priority Disciplines Outcome Goal Variances Interventions   Physical Therapy Goal     PT, PT/OT Ongoing, Progressing     Description: Short term goals:  1. Supine to sit with MInimal Assistance  2. Sit to stand transfer with Minimal Assistance  3. Bed to chair transfer with Minimal Assistance using Rolling Walker  4. Gait  x 25 feet with Minimal Assistance using Rolling Walker.     Long term goals:  1. Supine to sit with Contact Guard Assistance  2. Sit to stand transfer with Contact Guard Assistance  3. Bed to chair transfer with Contact Guard Assistance using Rolling Walker  4. Gait  x 50 feet with Contact Guard Assistance using Rolling Walker.                         Time Tracking:     PT Received On: 05/18/23  PT Start Time: 1033     PT Stop Time: 1057  PT Total Time (min): 24 min     Billable Minutes: Therapeutic Activity 10 and Therapeutic Exercise 15    Treatment Type: Treatment  PT/PTA: PTA     Number of PTA visits since last PT visit: 1 05/18/2023

## 2023-05-18 NOTE — SUBJECTIVE & OBJECTIVE
Interval History: naeo    Review of Systems   Reason unable to perform ROS: unable to talke, denies complaints with head shaking.   Objective:     Vital Signs (Most Recent):  Temp: 97 °F (36.1 °C) (05/18/23 1124)  Pulse: 77 (05/18/23 1124)  Resp: 17 (05/18/23 1111)  BP: 134/62 (05/18/23 1124)  SpO2: 98 % (05/18/23 1124) Vital Signs (24h Range):  Temp:  [97 °F (36.1 °C)-98.7 °F (37.1 °C)] 97 °F (36.1 °C)  Pulse:  [73-94] 77  Resp:  [16-19] 17  SpO2:  [96 %-99 %] 98 %  BP: (124-154)/(61-75) 134/62     Weight: 56.9 kg (125 lb 7.1 oz)  Body mass index is 19.07 kg/m².  No intake or output data in the 24 hours ending 05/18/23 1317      Physical Exam  Vitals reviewed.   Constitutional:       Appearance: Normal appearance.   HENT:      Head: Normocephalic and atraumatic.      Nose: Nose normal.   Eyes:      Conjunctiva/sclera: Conjunctivae normal.   Neck:      Trachea: Trachea normal.   Cardiovascular:      Rate and Rhythm: Normal rate and regular rhythm.      Pulses: Normal pulses.      Heart sounds: Normal heart sounds.   Pulmonary:      Effort: Pulmonary effort is normal.      Breath sounds: Normal breath sounds and air entry.   Abdominal:      General: Bowel sounds are normal.      Palpations: Abdomen is soft.   Musculoskeletal:      Right lower leg: No edema.      Left lower leg: No edema.   Skin:     General: Skin is warm and dry.   Neurological:      Mental Status: He is alert. Mental status is at baseline.      Cranial Nerves: Cranial nerves 2-12 are intact.      Comments: Grossly normal motor and sensory function without focal deficit appreciated.   Psychiatric:         Mood and Affect: Affect normal.         Behavior: Behavior is cooperative.      Comments: Non verbal           Significant Labs: All pertinent labs within the past 24 hours have been reviewed.    Significant Imaging: I have reviewed all pertinent imaging results/findings within the past 24 hours.   What Type Of Note Output Would You Prefer (Optional)?: Standard Output What Is The Reason For Today's Visit?: Full Body Skin Examination What Is The Reason For Today's Visit? (Being Monitored For X): the development of new lesions How Severe Are Your Spot(S)?: mild

## 2023-05-18 NOTE — SUBJECTIVE & OBJECTIVE
No current facility-administered medications on file prior to encounter.     Current Outpatient Medications on File Prior to Encounter   Medication Sig    desmopressin (DDAVP) 0.2 MG tablet Take 1 tablet (200 mcg total) by mouth nightly.    furosemide (LASIX) 20 MG tablet Take 1 tablet (20 mg total) by mouth 2 (two) times daily.    HYDROcodone-acetaminophen (NORCO) 7.5-325 mg per tablet Take 1 tablet by mouth every 6 (six) hours as needed for Pain.    NIFEdipine (ADALAT CC) 30 MG TbSR Take 30 mg by mouth once daily.       Review of patient's allergies indicates:  No Known Allergies    Past Medical History:   Diagnosis Date    Dialysis patient     Elevated PSA     Gout, unspecified     Hypertension     Renal disorder     Rheumatoid arthritis, unspecified      Past Surgical History:   Procedure Laterality Date    AV FISTULA PLACEMENT Right 3/20/2023    Procedure: CREATION, AV FISTULA;  Surgeon: Alfred Sierra MD;  Location: [facility];  Service: General;  Laterality: Right;  right basilic vein transposition    HAND SURGERY Left     urolift      in [place];     Family History       Problem Relation (Age of Onset)    Breast cancer Sister    Heart disease Father          Tobacco Use    Smoking status: Former     Types: Cigarettes     Quit date:      Years since quittin.4    Smokeless tobacco: Never   Substance and Sexual Activity    Alcohol use: Not Currently     Comment: quit in     Drug use: Never    Sexual activity: Not Currently     Review of Systems   Unable to perform ROS: Patient nonverbal   Objective:     Vital Signs (Most Recent):  Temp: 97 °F (36.1 °C) (23 1124)  Pulse: 77 (23 1124)  Resp: 17 (23 1111)  BP: 134/62 (23 1124)  SpO2: 98 % (23 1124) Vital Signs (24h Range):  Temp:  [97 °F (36.1 °C)-98.7 °F (37.1 °C)] 97 °F (36.1 °C)  Pulse:  [73-94] 77  Resp:  [16-19] 17  SpO2:  [96 %-99 %] 98 %  BP: (124-154)/(61-75) 134/62     Weight: 56.9 kg (125 lb 7.1 oz)  Body  mass index is 19.07 kg/m².     Physical Exam  Vitals reviewed.   Cardiovascular:      Rate and Rhythm: Normal rate.      Comments: Palpable thrill throughout right AV fistula without hematoma  Pulmonary:      Effort: Pulmonary effort is normal. No respiratory distress.   Skin:     General: Skin is warm and dry.   Neurological:      Mental Status: He is alert. Mental status is at baseline.          I have reviewed all pertinent lab results within the past 24 hours.    Significant Diagnostics:  I have reviewed all pertinent imaging results/findings within the past 24 hours.

## 2023-05-18 NOTE — CONSULTS
[facility]  General Surgery  Consult Note    Patient Name: Pardeep Collins  MRN: [id number]  Code Status: Full Code  Admission Date: 5/12/2023  Hospital Length of Stay: 4 days  Attending Physician: Robert Pride DO  Primary Care Provider: Primary Doctor No    Patient information was obtained from past medical records and primary team.     Inpatient consult to General Surgery  Consult performed by: MAX Sainz  Consult ordered by: Robert Pride DO      Subjective:     Principal Problem: Episode of transient neurologic symptoms    History of Present Illness: General surgery consult for bleeding versus difficult access of right AV fistula.  Palpable thrill throughout fistula without hematoma.  Likely had bleeding with access, but fistula appears to be functioning as it should.      No current facility-administered medications on file prior to encounter.     Current Outpatient Medications on File Prior to Encounter   Medication Sig    desmopressin (DDAVP) 0.2 MG tablet Take 1 tablet (200 mcg total) by mouth nightly.    furosemide (LASIX) 20 MG tablet Take 1 tablet (20 mg total) by mouth 2 (two) times daily.    HYDROcodone-acetaminophen (NORCO) 7.5-325 mg per tablet Take 1 tablet by mouth every 6 (six) hours as needed for Pain.    NIFEdipine (ADALAT CC) 30 MG TbSR Take 30 mg by mouth once daily.       Review of patient's allergies indicates:  No Known Allergies    Past Medical History:   Diagnosis Date    Dialysis patient     Elevated PSA     Gout, unspecified     Hypertension     Renal disorder     Rheumatoid arthritis, unspecified      Past Surgical History:   Procedure Laterality Date    AV FISTULA PLACEMENT Right 3/20/2023    Procedure: CREATION, AV FISTULA;  Surgeon: Alfred Sierra MD;  Location: [facility];  Service: General;  Laterality: Right;  right basilic vein transposition    HAND SURGERY Left     urolift      in [place];     Family History       Problem  Relation (Age of Onset)    Breast cancer Sister    Heart disease Father          Tobacco Use    Smoking status: Former     Types: Cigarettes     Quit date:      Years since quittin.4    Smokeless tobacco: Never   Substance and Sexual Activity    Alcohol use: Not Currently     Comment: quit in     Drug use: Never    Sexual activity: Not Currently     Review of Systems   Unable to perform ROS: Patient nonverbal   Objective:     Vital Signs (Most Recent):  Temp: 97 °F (36.1 °C) (23 1124)  Pulse: 77 (23 1124)  Resp: 17 (23 1111)  BP: 134/62 (23 1124)  SpO2: 98 % (23 1124) Vital Signs (24h Range):  Temp:  [97 °F (36.1 °C)-98.7 °F (37.1 °C)] 97 °F (36.1 °C)  Pulse:  [73-94] 77  Resp:  [16-19] 17  SpO2:  [96 %-99 %] 98 %  BP: (124-154)/(61-75) 134/62     Weight: 56.9 kg (125 lb 7.1 oz)  Body mass index is 19.07 kg/m².     Physical Exam  Vitals reviewed.   Cardiovascular:      Rate and Rhythm: Normal rate.      Comments: Palpable thrill throughout right AV fistula without hematoma  Pulmonary:      Effort: Pulmonary effort is normal. No respiratory distress.   Skin:     General: Skin is warm and dry.   Neurological:      Mental Status: He is alert. Mental status is at baseline.          I have reviewed all pertinent lab results within the past 24 hours.    Significant Diagnostics:  I have reviewed all pertinent imaging results/findings within the past 24 hours.      Assessment/Plan:     No notes have been filed under this hospital service.  Service: General Surgery    VTE Risk Mitigation (From admission, onward)           Ordered     IP VTE HIGH RISK PATIENT  Once         23     Place sequential compression device  Until discontinued         23                    Thank you for your consult. I will sign off. Please contact us if you have any additional questions.    Jt Martinez, RONALDP  General Surgery  [facility] - [address] [place] [address]

## 2023-05-18 NOTE — PT/OT/SLP PROGRESS
Occupational Therapy      Patient Name:  Pardeep Collins   MRN:  [id number]    Patient not seen today secondary to  (Pt was out of room at 9:50.). Will follow-up 5/18/2023.

## 2023-05-18 NOTE — ASSESSMENT & PLAN NOTE
Likely multifactorial.  Patient's hemoglobin stable from his baseline  Bled from av fistula last night  Transfusion today  Surgery consulted

## 2023-05-18 NOTE — PROGRESS NOTES
[facility] Hospital Medicine  Progress Note    Patient Name: Pardeep Collins  MRN: [id number]  Patient Class: IP- Inpatient   Admission Date: 5/12/2023  Length of Stay: 4 days  Attending Physician: Robert Pride DO  Primary Care Provider: Primary Doctor No        Subjective:     Principal Problem:Episode of transient neurologic symptoms        HPI:  Patient is an 82-year-old male with a history of essential hypertension and ESRD on HD on TTS with associated anemia who presents to the emergency room with transient episodes of slurred speech and right-sided weakness.  Patient stated that he was in his usual state of health until the today around 11:00 a.m. when he suddenly experienced slurred speech and right-sided weakness.  He stated that these symptoms lasted about 5 minute with complete resolution and did not think much of it when he experienced same symptoms at around 2:00 p.m. which lasted about 10 minutes again with complete resolution without any intervention.  Patient was subsequently brought in by EMS for evaluation.    On presentation, vital signs were stable and patient was afebrile.  Physical examination revealed NIHSS of 0 points.  Ensuing workup including CT of head was unremarkable.  Tele neurologist was consulted who recommended CTA of the head and neck and provided that there is no evidence of LVO, patient needs be started on DAPT with loading dose of Plavix and high-intensity statin.  Patient will be admitted for further evaluation and intervention.      Overview/Hospital Course:  5/17-chart reviewed, admitted for TIA then worsened symptoms s/p TNK without improvement.  Plan now for PT/OT and placement  5/18-av fistula bled last night, required transfusion today.  Surgery consulted      Interval History: naeo    Review of Systems   Reason unable to perform ROS: unable to talke, denies complaints with head shaking.   Objective:     Vital Signs (Most Recent):  Temp:  97 °F (36.1 °C) (05/18/23 1124)  Pulse: 77 (05/18/23 1124)  Resp: 17 (05/18/23 1111)  BP: 134/62 (05/18/23 1124)  SpO2: 98 % (05/18/23 1124) Vital Signs (24h Range):  Temp:  [97 °F (36.1 °C)-98.7 °F (37.1 °C)] 97 °F (36.1 °C)  Pulse:  [73-94] 77  Resp:  [16-19] 17  SpO2:  [96 %-99 %] 98 %  BP: (124-154)/(61-75) 134/62     Weight: 56.9 kg (125 lb 7.1 oz)  Body mass index is 19.07 kg/m².  No intake or output data in the 24 hours ending 05/18/23 1317      Physical Exam  Vitals reviewed.   Constitutional:       Appearance: Normal appearance.   HENT:      Head: Normocephalic and atraumatic.      Nose: Nose normal.   Eyes:      Conjunctiva/sclera: Conjunctivae normal.   Neck:      Trachea: Trachea normal.   Cardiovascular:      Rate and Rhythm: Normal rate and regular rhythm.      Pulses: Normal pulses.      Heart sounds: Normal heart sounds.   Pulmonary:      Effort: Pulmonary effort is normal.      Breath sounds: Normal breath sounds and air entry.   Abdominal:      General: Bowel sounds are normal.      Palpations: Abdomen is soft.   Musculoskeletal:      Right lower leg: No edema.      Left lower leg: No edema.   Skin:     General: Skin is warm and dry.   Neurological:      Mental Status: He is alert. Mental status is at baseline.      Cranial Nerves: Cranial nerves 2-12 are intact.      Comments: Grossly normal motor and sensory function without focal deficit appreciated.   Psychiatric:         Mood and Affect: Affect normal.         Behavior: Behavior is cooperative.      Comments: Non verbal           Significant Labs: All pertinent labs within the past 24 hours have been reviewed.    Significant Imaging: I have reviewed all pertinent imaging results/findings within the past 24 hours.      Assessment/Plan:      * Episode of transient neurologic symptoms    Patient 2 episodes of transient neurological deficits including symptoms of dysarthria and right-sided weakness.  When patient arrived in ED patient had NIHSS of  0.  Initial workup including CT of the head was unremarkable.  Tele neurologist recommended CTA of head and neck and provided that there is no evidence of LVO, patient will be started on DAPT with loading dose of Plavix and a high-intensity statin.  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  Patient's hemoglobin stable from his baseline  Bled from av fistula last night  Transfusion today  Surgery consulted    Primary hypertension    Adjust meds as needed        ESRD (end stage renal disease)    Patient is under the care of Dr. Aceves and dialyzes on Tuesdays Thursdays and Saturday.  Will consult Dr. Aceves        VTE Risk Mitigation (From admission, onward)         Ordered     IP VTE HIGH RISK PATIENT  Once         05/13/23 0041     Place sequential compression device  Until discontinued         05/13/23 0041                Discharge Planning   RASHAD:      Code Status: Full Code   Is the patient medically ready for discharge?:     Reason for patient still in hospital (select all that apply): Treatment  Discharge Plan A: Home, Home Health                  Robert Pride DO  Department of Hospital Medicine   [facility]

## 2023-05-18 NOTE — PROGRESS NOTES
[facility] - [address] [place] [address]  Adult Nutrition  First Assessment Note         Reason for Assessment  Reason For Assessment: RD follow-up   Nutrition Risk Screen: difficulty chewing/swallowing    Assessment and Plan  5/18-Patient seen for follow up. He continues on puree diet with Ensure Max Protein TID. Intake fluctuates 0-100%. Currently NPO for procedure.     5/15-Patient seen for follow up and NFPE.   Patient was sleeping and did not arouse to name being called x 2. No family was bedside. Patient visibly has moderate wasting to temple, buccal, orbital, clavicle and acromion areas. That, coupled with 13% weight loss x 2 months meets criteria for moderate protein-calorie malnutrition per ASPEN guidelines.   He was evaluated by ST this morning and was downgraded to Puree with Thin liquids. Lunch was documented as 100%. Recommend continue puree diet as able/appropriate and tolerated. He is also receiving Ensure Max Protein TID. Recommend continue as able/appropriate and tolerated.     Last BM 5/16. Monitor labs, meds, weights, po intakes/diet changes, updates in pt condition. RD following.     HPI:  Patient is an 82-year-old male with a history of essential hypertension and ESRD on HD on TTS with associated anemia who presents to the emergency room with transient episodes of slurred speech and right-sided weakness.  Patient stated that he was in his usual state of health until the today around 11:00 a.m. when he suddenly experienced slurred speech and right-sided weakness.  He stated that these symptoms lasted about 5 minute with complete resolution and did not think much of it when he experienced same symptoms at around 2:00 p.m. which lasted about 10 minutes again with complete resolution without any intervention.  Patient was subsequently brought in by EMS for evaluation.     On presentation, vital signs were stable and patient was afebrile.  Physical examination revealed NIHSS of 0 points.   Ensuing workup including CT of head was unremarkable.  Tele neurologist was consulted who recommended CTA of the head and neck and provided that there is no evidence of LVO, patient needs be started on DAPT with loading dose of Plavix and high-intensity statin.  Patient will be admitted for further evaluation and intervention.        Overview/Hospital Course:  5/17-chart reviewed, admitted for TIA then worsened symptoms s/p TNK without improvement.  Plan now for PT/OT and placement       Malnutrition  Is Patient Malnourished: Yes   Nutrition consulted. Most recent weight and BMI monitored-     Measurements:  Wt Readings from Last 1 Encounters:   05/15/23 56.9 kg (125 lb 7.1 oz)   Body mass index is 19.07 kg/m².    Patient has been screened and assessed by RD.    Malnutrition Type:  Context: chronic illness  Level: moderate    Malnutrition Characteristic Summary:  Weight Loss (Malnutrition): 10% in 6 months    Interventions/Recommendations (treatment strategy):  Recommend continue puree diet with thin liquids as able/appropriate and tolerated. Continue Ensure Max Protein TID. Encourage good po intakes.    Skin Integrity  Randy Risk Assessment  Sensory Perception: 3-->slightly limited  Moisture: 3-->occasionally moist  Activity: 1-->bedfast  Mobility: 2-->very limited  Nutrition: 3-->adequate  Friction and Shear: 2-->potential problem  Randy Score: 14    Nutrition Diagnosis  Unintentional weight loss   related to Inadequate Caloric intake as evidenced by weight loss of 20 lbs x 2 months, 14 lbs x 1 month    Nutrition Diagnosis Status: New    Nutrition Risk  Level of Risk/Frequency of Follow-up: high    Recent Labs   Lab 05/17/23  1518 05/18/23  0225   GLU  --  86   POCGLU 129*  --        Comments on Glucose: no hx DM  Nutrition Prescription / Recommendations  Recommendation/Intervention: Recommend continue puree diet with thin liquids as able/appropriate and tolerated. Continue Ensure Max Protein TID. Encourage good  po intakes.  Goals: Weight maintenance, intake % of meals and supplements.  Nutrition Goal Status: progressing towards goal  Current Diet Order: Puree diet- 5/18 NPO for procedure  Oral Nutrition Supplement: Ensure Max Protein with meals  Chewing or Swallowing Difficulty?: No Chewing or swallowing difficulty  Recommended Diet: Puree/cardiac  Recommended Oral Supplement: Ensure Max Protein [150 kcals, 30g Protein, 6g Carbs(2g Fiber, 1g Sugar), 1.5g Fat] three times daily  Is Nutrition Support Recommended: No  Is Education Recommended: No  Monitor and Evaluation  % current Intake: Unknown/ No P.O. intake documented  % intake to meet estimated needs: P.O. + Supplements  Food and Nutrient Intake: food and beverage intake  Food and Nutrient Adminstration: diet order  Anthropometric Measurements: weight, weight change  Biochemical Data, Medical Tests and Procedures: electrolyte and renal panel, gastrointestinal profile, glucose/endocrine profile, inflammatory profile, lipid profile  Nutrition-Focused Physical Findings: overall appearance, extremities, muscles and bones, head and eyes, skin     Current Medical Diagnosis and Past Medical History  Diagnosis: other (see comments) (episode of transient neurologic symptoms)  Past Medical History:   Diagnosis Date    Dialysis patient     Elevated PSA     Gout, unspecified     Hypertension     Renal disorder     Rheumatoid arthritis, unspecified      Nutrition/Diet History  Spiritual, Cultural Beliefs, Alevism Practices, Values that Affect Care: no  Lab/Procedures/Meds  Recent Labs   Lab 05/18/23  0225      K 4.1   BUN 38*   CREATININE 5.95*   CALCIUM 8.1*          Last A1c: No results found for: HGBA1C  Lab Results   Component Value Date    RBC 2.12 (L) 05/18/2023    HGB 6.8 (L) 05/18/2023    HCT 21.0 (L) 05/18/2023    MCV 99.1 (H) 05/18/2023    MCH 32.1 (H) 05/18/2023    MCHC 32.4 05/18/2023    TIBC 171 (L) 05/12/2023     Pertinent Labs Reviewed:  "reviewed  Pertinent Labs Comments: K 3.2(L)-unsure etiology, recommend replete to WNL as appropraite; Cl 96(L)-possibly r/t volume status; Creat 4.71(H), BUN/Creat 4(L), GFR 12(L)-r/t ESRD on HD; Alb 2.6(L)-poss r/t ESRD, inflammatory response/stress response/inadequate protein intakes to meet increased needs via ESRD. Will monitor trends.   Pertinent Medications Reviewed: reviewed  Pertinent Medications Comments: aspirin, atorvastatin, clopidogrel, enoxaparin  Anthropometrics  Temp: 98.6 °F (37 °C)  Height Method: Stated  Height: 5' 8" (172.7 cm)  Height (inches): 68 in  Weight Method: Bed Scale  Weight: 56.9 kg (125 lb 7.1 oz)  Weight (lb): 125.44 lb  Ideal Body Weight (IBW), Male: 154 lb  % Ideal Body Weight, Male (lb): 81.17 %  BMI (Calculated): 19.1     Estimated/Assessed Needs  Weight Used For Calorie Calculations: 56.7 kg (125 lb)   Energy Need Method: Kcal/kg Energy Calorie Requirements (kcal): 5188-4189 kcal (30-35 kcal/kg)  Weight Used For Protein Calculations: 56.7 kg (125 lb)  Protein Requirements: 68-79 g PRO (1.2-1.4)       RDA Method (mL): 1701     Nutrition by Nursing  Diet/Nutrition Received: mechanical/dental soft  Intake (%): 0% (pt refused lunch)  Diet/Feeding Assistance: tray set-up     Last Bowel Movement: 05/16/23              Nutrition Follow-Up  RD Follow-up?: Yes    "

## 2023-05-18 NOTE — PHYSICIAN QUERY
PT Name: Pardeep Collins  MR #: [id number]    DOCUMENTATION CLARIFICATION     CDS:  Kim JARRETT, RN   Contact information:  [email or web address]     This form is a permanent document in the medical record.     Query Date: May 18, 2023    By submitting this query, we are merely seeking further clarification of documentation.. Please utilize your independent clinical judgment when addressing the question(s) below.    The medical record contains the following:   Indicators  Supporting Clinical Findings Location in Medical Record    Energy Intake     x Weight Loss 13% weight loss x 2 months  RD note 5/15/2023   x Fat Loss Patient visibly has moderate wasting to temple, buccal, orbital, clavicle and acromion areas.  RD note 5/15/2023    Muscle Loss      Edema/Fluid Accumulation      Reduced  Strength (by dynamometer)     x Weight, BMI, Usual Body Weight Wt 124.44 lbs.  BMI 19.1  IBW 154lbs. RD note 5/15/2023    Delayed Wound Healing     x Registered Dietician Diagnosis meets criteria for moderate protein-calorie malnutrition per ASPEN guidelines.  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  Completed stroke, no further active interventions  ESRD PN 5/17/2023    Social or Environmental Circumstances     x Treatment Ensure Max Protein TID MD order 5/15/2023    Other       Academy of Nutrition and Dietetics (Academy) and the American Society for Parenteral and Enteral Nutrition (A.S.P.E.N.) Clinical Characteristics to support Malnutrition   Malnutrition in the Context of Acute Illness or Injury Malnutrition in the Context of Chronic Illness or Injury Malnutrition in the Context of Social or Environmental Circumstances   Malnutrition Level Moderate Severe Moderate Severe   Moderate   Severe   Energy Intake <75%                   >7 days <50%                 >5 days  <75%           >1 month <75%                      >1 month   <75% for >3 months   <50% for >1 month   Weight Loss   1-2% in 1 week >2% in 1 week 5% in 1 month >5% in 1 month 5% in 1 month >5% in 1 month    5% in 1 month >5% in 1 month 7.5% in 3 months >7.5% in 3 months 7.5% in 3 months >7.5% in 3 months    7.5% in 3 months >7.5% in 3 months 10% in 6 months >10% in 6 months 10% in 6 months >10% in 6 months        20% in 1 year                    >20% in 1 year                                                                  20% in 1 year                            >20% in 1 year                                                  Subcutaneous Fat Loss Mild  Moderate  Mild  Severe    Mild   Severe   Muscle Loss Mild  Moderate  Mild  Severe    Mild   Severe   Edema/Fluid Accumulation Mild Moderate to severe  Mild  Severe   Mild   Severe   Reduced  Strength         (based on standards supplied by  of dynamometer) N/A Measurably reduced N/A Measurably reduced N/A Measurably reduced     Criteria for mild malnutrition is defined as 1 characteristic outlined above within the established moderate or severe parameters.  A minimum of 2 out of the 6 characteristics noted above are recommended for a diagnosis of moderate or severe malnutrition.  Chronic illness/injury is a disease/condition lasting 3 months or longer.    The noted clinical guidelines are only system guidelines and do not replace the providers clinical judgment.    Provider, please specify diagnosis or diagnoses associated with above clinical findings.    [  x] Moderate Malnutrition - a minimum of 2 of the 6 moderate malnutrition characteristics noted above    [  ] Other Nutritional Diagnosis (please specify): _______   [  ] Malnutrition ruled out     Please document in your progress notes daily for the duration of treatment until resolved and  include in your discharge summary.      References:    HELADIO Carlin, & NOHEMY Hinojosa (2022, April). Assessment and  management of anorexia and cachexia in palliative care. Retrieved May 23, 2022, from [email or web address]-[email or web address]-management-[email or web address]-[email or web address]-cachexia-[email or web address]-[email or web address]-care?[email or web address]_link     MERRILL Real, PhD, RD, Janny PAINTER P., PhD, RN, TITA Howard MD, PhD, Suman NEFF A., MS, RD, [facility], KAILA Valdes, MS, RD, The Academy Malnutrition Work Group, The A.S.P.E.N. Board of Directors. (2012). Consensus Statement: Academy of Nutrition and Dietetics and American Society for Parenteral and Enteral Nutrition: Characteristics Recommended for the Identification and Documentation of Adult Malnutrition (Undernutrition). Journal of Parenteral and Enteral Nutrition, 36(3), 275-283. doi:10.1177/1428018650588820     Form No. 02658

## 2023-05-18 NOTE — DIALYSIS ROUNDING
The patient was seen while in dialysis today.The dialysis nurse was having difficulty cannulating the patient's A-V graft.The patient had seen earlier today by Dr Sierra who evaluated the graft.  An ultrasound of the hemodialysis graft revealed that the graft was patent and there was no hematoma or pseudoaneurysm involving the A-V graft.

## 2023-05-18 NOTE — PT/OT/SLP PROGRESS
Occupational Therapy      Patient Name:  Pardeep Collins   MRN:  [id number]    Patient not seen today secondary to Dialysis, Patient unwilling to participate (OT attempted tx this PM; pt refused upon 1st attempt and pt taken to dialysis 2nd attempt). Will follow-up 5/19/23.    5/18/2023

## 2023-05-19 LAB
ANION GAP SERPL CALCULATED.3IONS-SCNC: 11 MMOL/L (ref 7–16)
BASOPHILS # BLD AUTO: 0.01 K/UL (ref 0–0.2)
BASOPHILS NFR BLD AUTO: 0.2 % (ref 0–1)
BUN SERPL-MCNC: 24 MG/DL (ref 7–18)
BUN/CREAT SERPL: 6 (ref 6–20)
CALCIUM SERPL-MCNC: 8.4 MG/DL (ref 8.5–10.1)
CHLORIDE SERPL-SCNC: 103 MMOL/L (ref 98–107)
CO2 SERPL-SCNC: 30 MMOL/L (ref 21–32)
CREAT SERPL-MCNC: 3.78 MG/DL (ref 0.7–1.3)
DIFFERENTIAL METHOD BLD: ABNORMAL
EGFR (NO RACE VARIABLE) (RUSH/TITUS): 15 ML/MIN/1.73M2
EOSINOPHIL # BLD AUTO: 0.04 K/UL (ref 0–0.5)
EOSINOPHIL NFR BLD AUTO: 0.7 % (ref 1–4)
EOSINOPHIL NFR BLD MANUAL: 1 % (ref 1–4)
ERYTHROCYTE [DISTWIDTH] IN BLOOD BY AUTOMATED COUNT: 14.1 % (ref 11.5–14.5)
GLUCOSE SERPL-MCNC: 106 MG/DL (ref 74–106)
GLUCOSE SERPL-MCNC: 166 MG/DL (ref 70–105)
HCT VFR BLD AUTO: 24.1 % (ref 40–54)
HGB BLD-MCNC: 7.8 G/DL (ref 13.5–18)
IMM GRANULOCYTES # BLD AUTO: 0.04 K/UL (ref 0–0.04)
IMM GRANULOCYTES NFR BLD: 0.7 % (ref 0–0.4)
LYMPHOCYTES # BLD AUTO: 0.68 K/UL (ref 1–4.8)
LYMPHOCYTES NFR BLD AUTO: 12.1 % (ref 27–41)
LYMPHOCYTES NFR BLD MANUAL: 14 % (ref 27–41)
MCH RBC QN AUTO: 31.3 PG (ref 27–31)
MCHC RBC AUTO-ENTMCNC: 32.4 G/DL (ref 32–36)
MCV RBC AUTO: 96.8 FL (ref 80–96)
MONOCYTES # BLD AUTO: 0.33 K/UL (ref 0–0.8)
MONOCYTES NFR BLD AUTO: 5.9 % (ref 2–6)
MONOCYTES NFR BLD MANUAL: 6 % (ref 2–6)
MPC BLD CALC-MCNC: 13.4 FL (ref 9.4–12.4)
NEUTROPHILS # BLD AUTO: 4.52 K/UL (ref 1.8–7.7)
NEUTROPHILS NFR BLD AUTO: 80.4 % (ref 53–65)
NEUTS BAND NFR BLD MANUAL: 1 % (ref 1–5)
NEUTS SEG NFR BLD MANUAL: 78 % (ref 50–62)
NRBC # BLD AUTO: 0 X10E3/UL
NRBC, AUTO (.00): 0 %
PLATELET # BLD AUTO: 112 K/UL (ref 150–400)
PLATELET MORPHOLOGY: ABNORMAL
POTASSIUM SERPL-SCNC: 4.3 MMOL/L (ref 3.5–5.1)
RBC # BLD AUTO: 2.49 M/UL (ref 4.6–6.2)
RBC MORPH BLD: NORMAL
SODIUM SERPL-SCNC: 140 MMOL/L (ref 136–145)
WBC # BLD AUTO: 5.62 K/UL (ref 4.5–11)

## 2023-05-19 PROCEDURE — 80048 BASIC METABOLIC PNL TOTAL CA: CPT | Performed by: INTERNAL MEDICINE

## 2023-05-19 PROCEDURE — 25000003 PHARM REV CODE 250: Performed by: INTERNAL MEDICINE

## 2023-05-19 PROCEDURE — 97110 THERAPEUTIC EXERCISES: CPT | Mod: CQ

## 2023-05-19 PROCEDURE — 82962 GLUCOSE BLOOD TEST: CPT

## 2023-05-19 PROCEDURE — 99232 SBSQ HOSP IP/OBS MODERATE 35: CPT | Mod: ,,, | Performed by: STUDENT IN AN ORGANIZED HEALTH CARE EDUCATION/TRAINING PROGRAM

## 2023-05-19 PROCEDURE — 99232 PR SUBSEQUENT HOSPITAL CARE,LEVL II: ICD-10-PCS | Mod: ,,, | Performed by: STUDENT IN AN ORGANIZED HEALTH CARE EDUCATION/TRAINING PROGRAM

## 2023-05-19 PROCEDURE — 25000003 PHARM REV CODE 250: Performed by: NURSE PRACTITIONER

## 2023-05-19 PROCEDURE — 92507 TX SP LANG VOICE COMM INDIV: CPT

## 2023-05-19 PROCEDURE — 97530 THERAPEUTIC ACTIVITIES: CPT | Mod: CQ

## 2023-05-19 PROCEDURE — 11000001 HC ACUTE MED/SURG PRIVATE ROOM

## 2023-05-19 PROCEDURE — 85025 COMPLETE CBC W/AUTO DIFF WBC: CPT | Performed by: INTERNAL MEDICINE

## 2023-05-19 PROCEDURE — 97112 NEUROMUSCULAR REEDUCATION: CPT

## 2023-05-19 RX ADMIN — POLYETHYLENE GLYCOL 3350 17 G: 17 POWDER, FOR SOLUTION ORAL at 09:05

## 2023-05-19 RX ADMIN — ATORVASTATIN CALCIUM 80 MG: 80 TABLET, FILM COATED ORAL at 10:05

## 2023-05-19 RX ADMIN — ASPIRIN 81 MG: 81 TABLET, COATED ORAL at 09:05

## 2023-05-19 NOTE — PT/OT/SLP PROGRESS
Occupational Therapy   Treatment    Name: Pardeep Collins  MRN: [id number]  Admitting Diagnosis:  Episode of transient neurologic symptoms       Recommendations:     Discharge Recommendations: nursing facility, skilled, rehabilitation facility  Discharge Equipment Recommendations:  other (see comments) (to be determined)  Barriers to discharge:  Decreased caregiver support    Assessment:     Pardeep Collins is a 82 y.o. male with a medical diagnosis of Episode of transient neurologic symptoms.  He presents with weakness, decreased functional mobility, and decline in ADLs. Performance deficits affecting function are weakness, impaired endurance, impaired self care skills, impaired functional mobility, gait instability, impaired balance, decreased upper extremity function, decreased lower extremity function, impaired coordination.     Rehab Prognosis:  Good; patient would benefit from acute skilled OT services to address these deficits and reach maximum level of function.       Plan:     Patient to be seen 5 x/week to address the above listed problems via self-care/home management, therapeutic exercises, therapeutic activities, neuromuscular re-education  Plan of Care Expires: 06/12/23  Plan of Care Reviewed with: patient    Subjective     Chief Complaint: weakness; CVA  Patient/Family Comments/goals: pt agreeable to OT tx  Pain/Comfort:  Pain Rating 1: 0/10    Objective:     Communicated with: ANGELICA Gonzalez prior to session.  Patient found HOB elevated with peripheral IV, telemetry upon OT entry to room.    General Precautions: Standard, fall    Orthopedic Precautions:N/A  Braces: N/A  Respiratory Status: Room air     Occupational Performance:     Bed Mobility:    Not performed     Functional Mobility/Transfers:  Not performed    Activities of Daily Living:  Not performed      [facility] 6 Click ADL:      Treatment & Education:  Pt performed x 20 reps each AAROM/PROM shoulder flexion, IR/ER, elbow flexion/extension,  pronation/supination, and finger flexion/extension with muscle tapping to elicit muscle contraction.     Patient left HOB elevated with all lines intact, call button in reach, and ANGELICA Gonzalez notified    GOALS:   Multidisciplinary Problems       Occupational Therapy Goals          Problem: Occupational Therapy    Goal Priority Disciplines Outcome Interventions   Occupational Therapy Goal     OT, PT/OT Ongoing, Progressing    Description: STG:  Pt will perform grooming with setup  Pt will bathe upper body and anterior trunk with ModA with setup at EOB  Pt will perform UE dressing with Janelle with hemiplegic technique   Pt will sit EOB x 10 min with SBA   Pt will transfer bed/chair/bsc with ModA with hemiwalker  Pt will perform standing task x 1 min with ModA with hemiwalker   Pt will tolerate 15 minutes of tx without fatigue      LT.Restore to max I with self care and mobility.                           Time Tracking:     OT Date of Treatment: 23  OT Start Time: 1012  OT Stop Time: 1025  OT Total Time (min): 13 min    Billable Minutes:Neuromuscular Re-education 13 minutes    OT/JONNY: OT          2023

## 2023-05-19 NOTE — ASSESSMENT & PLAN NOTE
New to dialysis.  Volume status is stable.  5/18/2023.  Continue with scheduled hemodialysis for this patient.  5/19/2023.  Continue with current therapy.

## 2023-05-19 NOTE — SUBJECTIVE & OBJECTIVE
Interval History: The patient is sitting up in bed.  He is working with speech therapy at this time.    Review of patient's allergies indicates:  No Known Allergies  Current Facility-Administered Medications   Medication Frequency    0.9%  NaCl infusion (for blood administration) Q24H PRN    0.9%  NaCl infusion PRN    acetaminophen tablet 650 mg Q4H PRN    aspirin EC tablet 81 mg Daily    atorvastatin tablet 80 mg QHS    docusate sodium capsule 100 mg BID PRN    HYDROcodone-acetaminophen 5-325 mg per tablet 1 tablet Q6H PRN    labetaloL injection 10 mg Q4H PRN    naloxone 0.4 mg/mL injection 0.02 mg PRN    ondansetron injection 4 mg Q8H PRN    polyethylene glycol packet 17 g Daily    sodium chloride 0.9% flush 10 mL Q12H PRN       Objective:     Vital Signs (Most Recent):  Temp: 99 °F (37.2 °C) (05/19/23 1100)  Pulse: 98 (05/19/23 1100)  Resp: 18 (05/19/23 1100)  BP: (!) 147/57 (05/19/23 1100)  SpO2: 98 % (05/19/23 1100) Vital Signs (24h Range):  Temp:  [97.9 °F (36.6 °C)-100 °F (37.8 °C)] 99 °F (37.2 °C)  Pulse:  [79-98] 98  Resp:  [17-18] 18  SpO2:  [96 %-99 %] 98 %  BP: (107-185)/(57-93) 147/57     Weight: 56.9 kg (125 lb 7.1 oz) (05/15/23 0243)  Body mass index is 19.07 kg/m².  Body surface area is 1.65 meters squared.    I/O last 3 completed shifts:  In: 980 [P.O.:120; Blood:360; Other:500]  Out: 1500 [Other:1500]     Physical Exam  Vitals reviewed.   HENT:      Head: Normocephalic and atraumatic.   Cardiovascular:      Rate and Rhythm: Regular rhythm.   Pulmonary:      Effort: Pulmonary effort is normal.   Abdominal:      General: Bowel sounds are normal.      Palpations: Abdomen is soft.   Neurological:      Mental Status: He is alert.   Psychiatric:         Mood and Affect: Mood normal.        Significant Labs:  BMP:   Recent Labs   Lab 05/12/23 2000 05/13/23  0527 05/19/23  0215   *   < > 106   *   < > 140   K 3.4*   < > 4.3   CL 95*   < > 103   CO2 32   < > 30   BUN 15   < > 24*   CREATININE  4.51*   < > 3.78*   CALCIUM 8.6   < > 8.4*   MG 2.0  --   --     < > = values in this interval not displayed.     CBC:   Recent Labs   Lab 05/19/23  0215   WBC 5.62   RBC 2.49*   HGB 7.8*   HCT 24.1*   *   MCV 96.8*   MCH 31.3*   MCHC 32.4        Significant Imaging:  Labs: Reviewed

## 2023-05-19 NOTE — ASSESSMENT & PLAN NOTE
New to dialysis.  Volume status is stable.  5/18/2023.  Continue with scheduled hemodialysis for this patient.

## 2023-05-19 NOTE — PLAN OF CARE
Edwar spoke with jorge at [facility] and they will not be able to accept pt over weekend at this time. Pt will be accepted on Monday. Dr vanessa. Ss following.

## 2023-05-19 NOTE — PT/OT/SLP PROGRESS
Speech Language Pathology Treatment    Patient Name:  Pardeep Collins   MRN:  [id number]  Admitting Diagnosis: Episode of transient neurologic symptoms    Recommendations:                 General Recommendations:  Speech/language therapy  Diet recommendations:  Puree, Liquid Diet Level: Thin   Aspiration Precautions: Standard aspiration precautions   General Precautions: Standard,    Communication strategies:  communication board. Patient presents with severe dysarthria and requires max encouragement to vocalize.     Assessment:     Pardeep Collins is a 82 y.o. male with an SLP diagnosis of Dysarthria.  He presents with right sided weakness and severe dysarthria.    Subjective     Patient lying in bed awake and somewhat agreeable to speech therapy. Patient refused therapy this morning but did participate this afternoon. He required max prompting to vocalize.  Patient goals: not stated     Pain/Comfort:       Respiratory Status: Room air    Objective:     Has the patient been evaluated by SLP for swallowing?   Yes  Keep patient NPO? No   Current Respiratory Status:        Patient completed lingual protrusions, elevations, depressions, and lats with min to mod accuracy after cues/imitation.  Patient completed labial protrusions and retractions with min to mod accuracy after cues/imitation.  Patient utilized dysarthria techniques in  words with 20% accuracy after cues/imitation. Patient presented with very low volume during therapeutic strategies; however, would attempt to increase his volume when prompted.    Patient was agreeable to speech therapy; however, he required maximum prompting and encouragement to participate and engage throughout the therapy session.    Goals:   Multidisciplinary Problems       SLP Goals          Problem: SLP    Goal Priority Disciplines Outcome   SLP Goal     SLP Ongoing, Progressing   Description: Patient will complete lingual protrusions, elevations, depressions, and lats with mod-max  accuracy Independently.   Patient will complete labial protrusions and retractions with mod-max accuracy Independently.   Patient will utilize dysarthria techniques with 80% accuracy Independently.                           Plan:     Patient to be seen:  5 x/week   Plan of Care expires:  05/28/23  Plan of Care reviewed with:  patient   SLP Follow-Up:  Yes       Discharge recommendations:      Barriers to Discharge:  Decreased Care Giver Support    Time Tracking:     SLP Treatment Date:   05/19/23  Speech Start Time:  1242  Speech Stop Time:  1305     Speech Total Time (min):  23 min    Billable Minutes: Speech Therapy Individual 23    05/19/2023

## 2023-05-19 NOTE — PROGRESS NOTES
[facility]  Nephrology  Progress Note    Patient Name: Pardeep Collins  MRN: [id number]  Admission Date: 5/12/2023  Hospital Length of Stay: 4 days  Attending Provider: Robert Pride DO   Primary Care Physician: Primary Doctor No  Principal Problem:Episode of transient neurologic symptoms    Subjective:     HPI: This gentleman with history of HTN, anemia, gout who recently started dialysis within the past month.  The patient presented with dysarthria and weakness that started on yesterday.  He had further work up with CT head showed no acute stroke and has been admitted for TIA. CTA showed no evidence of large vessel occlusion. He is awake and alert. He is dysarthric. He denies any shortness or chest pain. Nephrology is consulted for renal issues.      Interval History: The patient is resting.  He has just completed dialysis.  He appears comfortable.    Review of patient's allergies indicates:  No Known Allergies  Current Facility-Administered Medications   Medication Frequency    0.9%  NaCl infusion (for blood administration) Q24H PRN    0.9%  NaCl infusion PRN    acetaminophen tablet 650 mg Q4H PRN    aspirin EC tablet 81 mg Daily    atorvastatin tablet 80 mg QHS    docusate sodium capsule 100 mg BID PRN    HYDROcodone-acetaminophen 5-325 mg per tablet 1 tablet Q6H PRN    labetaloL injection 10 mg Q4H PRN    mupirocin 2 % ointment BID    naloxone 0.4 mg/mL injection 0.02 mg PRN    ondansetron injection 4 mg Q8H PRN    polyethylene glycol packet 17 g Daily    sodium chloride 0.9% flush 10 mL Q12H PRN       Objective:     Vital Signs (Most Recent):  Temp: 97.9 °F (36.6 °C) (05/18/23 1518)  Pulse: (P) 86 (05/18/23 1830)  Resp: 17 (05/18/23 1518)  BP: (!) (P) 156/81 (05/18/23 1830)  SpO2: 99 % (05/18/23 1340) Vital Signs (24h Range):  Temp:  [97 °F (36.1 °C)-100 °F (37.8 °C)] 97.9 °F (36.6 °C)  Pulse:  [73-98] (P) 86  Resp:  [16-18] 17  SpO2:  [96 %-99 %] 99 %  BP:  (115-185)/(61-93) (P) 156/81     Weight: 56.9 kg (125 lb 7.1 oz) (05/15/23 0243)  Body mass index is 19.07 kg/m².  Body surface area is 1.65 meters squared.    I/O last 3 completed shifts:  In: 860 [Blood:360; Other:500]  Out: 1500 [Other:1500]     Physical Exam  Vitals reviewed.   HENT:      Head: Normocephalic.      Mouth/Throat:      Mouth: Mucous membranes are moist.   Cardiovascular:      Rate and Rhythm: Regular rhythm.   Pulmonary:      Effort: Pulmonary effort is normal.   Abdominal:      Palpations: Abdomen is soft.   Musculoskeletal:      Cervical back: Neck supple.   Neurological:      Mental Status: He is alert.        Significant Labs:  BMP:   Recent Labs   Lab 05/12/23 2000 05/13/23  0527 05/18/23  0225   *   < > 86   *   < > 139   K 3.4*   < > 4.1   CL 95*   < > 102   CO2 32   < > 28   BUN 15   < > 38*   CREATININE 4.51*   < > 5.95*   CALCIUM 8.6   < > 8.1*   MG 2.0  --   --     < > = values in this interval not displayed.     CBC:   Recent Labs   Lab 05/18/23  0357   WBC 4.72   RBC 2.12*   HGB 6.8*   HCT 21.0*   *   MCV 99.1*   MCH 32.1*   MCHC 32.4        Significant Imaging:  Labs: Reviewed    Assessment/Plan:     Renal/  ESRD (end stage renal disease)  New to dialysis.  Volume status is stable.  5/18/2023.  Continue with scheduled hemodialysis for this patient.      CVA:  Thank you for your consult. I will follow-up with patient. Please contact us if you have any additional questions.    Chandler Aceves Jr, MD  Nephrology  [facility] - [address] [place] [address]

## 2023-05-19 NOTE — PROGRESS NOTES
[facility]  Nephrology  Progress Note    Patient Name: Pardeep Collins  MRN: [id number]  Admission Date: 5/12/2023  Hospital Length of Stay: 5 days  Attending Provider: Robert Pride DO   Primary Care Physician: Primary Doctor No  Principal Problem:Episode of transient neurologic symptoms    Subjective:     HPI: This gentleman with history of HTN, anemia, gout who recently started dialysis within the past month.  The patient presented with dysarthria and weakness that started on yesterday.  He had further work up with CT head showed no acute stroke and has been admitted for TIA. CTA showed no evidence of large vessel occlusion. He is awake and alert. He is dysarthric. He denies any shortness or chest pain. Nephrology is consulted for renal issues.      Interval History: The patient is sitting up in bed.  He is working with speech therapy at this time.    Review of patient's allergies indicates:  No Known Allergies  Current Facility-Administered Medications   Medication Frequency    0.9%  NaCl infusion (for blood administration) Q24H PRN    0.9%  NaCl infusion PRN    acetaminophen tablet 650 mg Q4H PRN    aspirin EC tablet 81 mg Daily    atorvastatin tablet 80 mg QHS    docusate sodium capsule 100 mg BID PRN    HYDROcodone-acetaminophen 5-325 mg per tablet 1 tablet Q6H PRN    labetaloL injection 10 mg Q4H PRN    naloxone 0.4 mg/mL injection 0.02 mg PRN    ondansetron injection 4 mg Q8H PRN    polyethylene glycol packet 17 g Daily    sodium chloride 0.9% flush 10 mL Q12H PRN       Objective:     Vital Signs (Most Recent):  Temp: 99 °F (37.2 °C) (05/19/23 1100)  Pulse: 98 (05/19/23 1100)  Resp: 18 (05/19/23 1100)  BP: (!) 147/57 (05/19/23 1100)  SpO2: 98 % (05/19/23 1100) Vital Signs (24h Range):  Temp:  [97.9 °F (36.6 °C)-100 °F (37.8 °C)] 99 °F (37.2 °C)  Pulse:  [79-98] 98  Resp:  [17-18] 18  SpO2:  [96 %-99 %] 98 %  BP: (107-185)/(57-93) 147/57     Weight: 56.9 kg (125  lb 7.1 oz) (05/15/23 0243)  Body mass index is 19.07 kg/m².  Body surface area is 1.65 meters squared.    I/O last 3 completed shifts:  In: 980 [P.O.:120; Blood:360; Other:500]  Out: 1500 [Other:1500]     Physical Exam  Vitals reviewed.   HENT:      Head: Normocephalic and atraumatic.   Cardiovascular:      Rate and Rhythm: Regular rhythm.   Pulmonary:      Effort: Pulmonary effort is normal.   Abdominal:      General: Bowel sounds are normal.      Palpations: Abdomen is soft.   Neurological:      Mental Status: He is alert.   Psychiatric:         Mood and Affect: Mood normal.        Significant Labs:  BMP:   Recent Labs   Lab 05/12/23 2000 05/13/23 0527 05/19/23 0215   *   < > 106   *   < > 140   K 3.4*   < > 4.3   CL 95*   < > 103   CO2 32   < > 30   BUN 15   < > 24*   CREATININE 4.51*   < > 3.78*   CALCIUM 8.6   < > 8.4*   MG 2.0  --   --     < > = values in this interval not displayed.     CBC:   Recent Labs   Lab 05/19/23 0215   WBC 5.62   RBC 2.49*   HGB 7.8*   HCT 24.1*   *   MCV 96.8*   MCH 31.3*   MCHC 32.4        Significant Imaging:  Labs: Reviewed    Assessment/Plan:     Neuro  Dysarthria and anarthria  Speech therapy.    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  5/19/2023.  Continue with therapy.    Renal/  ESRD (end stage renal disease)  New to dialysis.  Volume status is stable.  5/18/2023.  Continue with scheduled hemodialysis for this patient.  5/19/2023.  Continue with current therapy.         Thank you for your consult. I will follow-up with patient. Please contact us if you have any additional questions.    Chandler Aceves Jr, MD  Nephrology  [facility]

## 2023-05-19 NOTE — SUBJECTIVE & OBJECTIVE
Interval History: The patient is resting.  He has just completed dialysis.  He appears comfortable.    Review of patient's allergies indicates:  No Known Allergies  Current Facility-Administered Medications   Medication Frequency    0.9%  NaCl infusion (for blood administration) Q24H PRN    0.9%  NaCl infusion PRN    acetaminophen tablet 650 mg Q4H PRN    aspirin EC tablet 81 mg Daily    atorvastatin tablet 80 mg QHS    docusate sodium capsule 100 mg BID PRN    HYDROcodone-acetaminophen 5-325 mg per tablet 1 tablet Q6H PRN    labetaloL injection 10 mg Q4H PRN    mupirocin 2 % ointment BID    naloxone 0.4 mg/mL injection 0.02 mg PRN    ondansetron injection 4 mg Q8H PRN    polyethylene glycol packet 17 g Daily    sodium chloride 0.9% flush 10 mL Q12H PRN       Objective:     Vital Signs (Most Recent):  Temp: 97.9 °F (36.6 °C) (05/18/23 1518)  Pulse: (P) 86 (05/18/23 1830)  Resp: 17 (05/18/23 1518)  BP: (!) (P) 156/81 (05/18/23 1830)  SpO2: 99 % (05/18/23 1340) Vital Signs (24h Range):  Temp:  [97 °F (36.1 °C)-100 °F (37.8 °C)] 97.9 °F (36.6 °C)  Pulse:  [73-98] (P) 86  Resp:  [16-18] 17  SpO2:  [96 %-99 %] 99 %  BP: (115-185)/(61-93) (P) 156/81     Weight: 56.9 kg (125 lb 7.1 oz) (05/15/23 0243)  Body mass index is 19.07 kg/m².  Body surface area is 1.65 meters squared.    I/O last 3 completed shifts:  In: 860 [Blood:360; Other:500]  Out: 1500 [Other:1500]     Physical Exam  Vitals reviewed.   HENT:      Head: Normocephalic.      Mouth/Throat:      Mouth: Mucous membranes are moist.   Cardiovascular:      Rate and Rhythm: Regular rhythm.   Pulmonary:      Effort: Pulmonary effort is normal.   Abdominal:      Palpations: Abdomen is soft.   Musculoskeletal:      Cervical back: Neck supple.   Neurological:      Mental Status: He is alert.        Significant Labs:  BMP:   Recent Labs   Lab 05/12/23 2000 05/13/23  0527 05/18/23  0225   *   < > 86   *   < > 139   K 3.4*   < > 4.1   CL 95*   < > 102   CO2 32    < > 28   BUN 15   < > 38*   CREATININE 4.51*   < > 5.95*   CALCIUM 8.6   < > 8.1*   MG 2.0  --   --     < > = values in this interval not displayed.     CBC:   Recent Labs   Lab 05/18/23  0357   WBC 4.72   RBC 2.12*   HGB 6.8*   HCT 21.0*   *   MCV 99.1*   MCH 32.1*   MCHC 32.4        Significant Imaging:  Labs: Reviewed

## 2023-05-19 NOTE — PROGRESS NOTES
[facility] Hospital Medicine  Progress Note    Patient Name: Pardeep Collins  MRN: [id number]  Patient Class: IP- Inpatient   Admission Date: 5/12/2023  Length of Stay: 5 days  Attending Physician: Robert Pride DO  Primary Care Provider: Primary Doctor No        Subjective:     Principal Problem:Episode of transient neurologic symptoms        HPI:  Patient is an 82-year-old male with a history of essential hypertension and ESRD on HD on TTS with associated anemia who presents to the emergency room with transient episodes of slurred speech and right-sided weakness.  Patient stated that he was in his usual state of health until the today around 11:00 a.m. when he suddenly experienced slurred speech and right-sided weakness.  He stated that these symptoms lasted about 5 minute with complete resolution and did not think much of it when he experienced same symptoms at around 2:00 p.m. which lasted about 10 minutes again with complete resolution without any intervention.  Patient was subsequently brought in by EMS for evaluation.    On presentation, vital signs were stable and patient was afebrile.  Physical examination revealed NIHSS of 0 points.  Ensuing workup including CT of head was unremarkable.  Tele neurologist was consulted who recommended CTA of the head and neck and provided that there is no evidence of LVO, patient needs be started on DAPT with loading dose of Plavix and high-intensity statin.  Patient will be admitted for further evaluation and intervention.      Overview/Hospital Course:  5/17-chart reviewed, admitted for TIA then worsened symptoms s/p TNK without improvement.  Plan now for PT/OT and placement  5/18-av fistula bled last night, required transfusion today.  Surgery consulted  5/19-need to see his hb remain stable after transfusion prior to discharge.       Interval History: naeo    Review of Systems   Reason unable to perform ROS: unable to talke, denies  complaints with head shaking.   Objective:     Vital Signs (Most Recent):  Temp: 99 °F (37.2 °C) (05/19/23 1100)  Pulse: 98 (05/19/23 1100)  Resp: 18 (05/19/23 1100)  BP: (!) 147/57 (05/19/23 1100)  SpO2: 98 % (05/19/23 1100) Vital Signs (24h Range):  Temp:  [97.9 °F (36.6 °C)-100 °F (37.8 °C)] 99 °F (37.2 °C)  Pulse:  [79-98] 98  Resp:  [17-18] 18  SpO2:  [96 %-99 %] 98 %  BP: (107-185)/(57-93) 147/57     Weight: 56.9 kg (125 lb 7.1 oz)  Body mass index is 19.07 kg/m².    Intake/Output Summary (Last 24 hours) at 5/19/2023 1254  Last data filed at 5/19/2023 0620  Gross per 24 hour   Intake 980 ml   Output 1500 ml   Net -520 ml         Physical Exam  Vitals reviewed.   Constitutional:       Appearance: Normal appearance.   HENT:      Head: Normocephalic and atraumatic.      Nose: Nose normal.   Eyes:      Conjunctiva/sclera: Conjunctivae normal.   Neck:      Trachea: Trachea normal.   Cardiovascular:      Rate and Rhythm: Normal rate and regular rhythm.      Pulses: Normal pulses.      Heart sounds: Normal heart sounds.   Pulmonary:      Effort: Pulmonary effort is normal.      Breath sounds: Normal breath sounds and air entry.   Abdominal:      General: Bowel sounds are normal.      Palpations: Abdomen is soft.   Musculoskeletal:      Right lower leg: No edema.      Left lower leg: No edema.   Skin:     General: Skin is warm and dry.   Neurological:      Mental Status: He is alert. Mental status is at baseline.      Cranial Nerves: Cranial nerves 2-12 are intact.      Comments: Grossly normal motor and sensory function without focal deficit appreciated.   Psychiatric:         Mood and Affect: Affect normal.         Behavior: Behavior is cooperative.      Comments: Non verbal           Significant Labs: All pertinent labs within the past 24 hours have been reviewed.    Significant Imaging: I have reviewed all pertinent imaging results/findings within the past 24 hours.      Assessment/Plan:      * Episode of  transient neurologic symptoms    Patient 2 episodes of transient neurological deficits including symptoms of dysarthria and right-sided weakness.  When patient arrived in ED patient had NIHSS of 0.  Initial workup including CT of the head was unremarkable.  Tele neurologist recommended CTA of head and neck and provided that there is no evidence of LVO, patient will be started on DAPT with loading dose of Plavix and a high-intensity statin.  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 Aceves and dialyzes on Tuesdays Thursdays and Saturday.  Will consult Dr. Aceves        VTE Risk Mitigation (From admission, onward)         Ordered     IP VTE HIGH RISK PATIENT  Once         05/13/23 0041     Place sequential compression device  Until discontinued         05/13/23 0041                Discharge Planning   RASHAD:      Code Status: Full Code   Is the patient medically ready for discharge?:     Reason for patient still in hospital (select all that apply): Treatment  Discharge Plan A: Home, Home Health                  Robert Pride DO  Department of Hospital Medicine   [facility]

## 2023-05-19 NOTE — PT/OT/SLP PROGRESS
Physical Therapy Treatment    Patient Name:  Pardeep Collins   MRN:  [id number]    Recommendations:     Discharge Recommendations: nursing facility, skilled, rehabilitation facility  Discharge Equipment Recommendations: other (see comments) (to be determined)  Barriers to discharge:  ongoing medical treatment    Assessment:     Pardeep Collins is a 82 y.o. male admitted with a medical diagnosis of Episode of transient neurologic symptoms.  He presents with the following impairments/functional limitations: weakness, impaired endurance, impaired self care skills, impaired functional mobility, decreased lower extremity function.    Pt agreeable to PT in PM after initial refusal in AM.  Pt able to assist with all functional mobility, however, unable to progress to gait.  Pt incontinent of bowel upon standing requiring total assist for hygiene once returned to supine    Rehab Prognosis: Fair; patient would benefit from acute skilled PT services to address these deficits and reach maximum level of function.    Recent Surgery: * No surgery found *      Plan:     During this hospitalization, patient to be seen 5 x/week to address the identified rehab impairments via gait training, therapeutic activities, therapeutic exercises, neuromuscular re-education and progress toward the following goals:    Plan of Care Expires:  06/15/23    Subjective     Chief Complaint:  right hemiplegia  Patient/Family Comments/goals: pt agreeable  Pain/Comfort:         Objective:     Communicated with Carlos Conroy LPN prior to session.  Patient found HOB elevated with peripheral IV upon PT entry to room.     General Precautions: Standard, fall  Orthopedic Precautions: N/A  Braces: N/A  Respiratory Status: Room air     Functional Mobility:  Bed Mobility:     Rolling Left:  minimum assistance  Rolling Right: minimum assistance  Supine to Sit: minimum assistance  Sit to Supine: minimum assistance  Transfers:     Sit to Stand:  minimum assistance and of 2  persons with quad cane  Gait: unable      AM-PAC 6 CLICK MOBILITY  Turning over in bed (including adjusting bedclothes, sheets and blankets)?: 3  Sitting down on and standing up from a chair with arms (e.g., wheelchair, bedside commode, etc.): 2  Moving from lying on back to sitting on the side of the bed?: 3  Moving to and from a bed to a chair (including a wheelchair)?: 1  Need to walk in hospital room?: 1  Climbing 3-5 steps with a railing?: 1  Basic Mobility Total Score: 11       Treatment & Education:  L LE exercise x 30 reps - long arc quad, quad set, glut set, straight leg raise, hip ab/adduction, heel slide, ap with active assist     PROM R LE    Contact guard assist to stand by assist sitting EOB x 10 minutes focusing on maintaining midline positioning; holding bed rail with L hand    Minimum assist x 2 sit to stand x 2 trials with HW    Patient left HOB elevated with all lines intact and call button in reach..    GOALS:   Multidisciplinary Problems       Physical Therapy Goals          Problem: Physical Therapy    Goal Priority Disciplines Outcome Goal Variances Interventions   Physical Therapy Goal     PT, PT/OT Ongoing, Progressing     Description: Short term goals:  1. Supine to sit with MInimal Assistance  2. Sit to stand transfer with Minimal Assistance  3. Bed to chair transfer with Minimal Assistance using Rolling Walker  4. Gait  x 25 feet with Minimal Assistance using Rolling Walker.     Long term goals:  1. Supine to sit with Contact Guard Assistance  2. Sit to stand transfer with Contact Guard Assistance  3. Bed to chair transfer with Contact Guard Assistance using Rolling Walker  4. Gait  x 50 feet with Contact Guard Assistance using Rolling Walker.                         Time Tracking:     PT Received On: 05/19/23  PT Start Time: 1320     PT Stop Time: 1348  PT Total Time (min): 28 min     Billable Minutes: Therapeutic Activity 12 and Therapeutic Exercise 12    Treatment Type:  Treatment  PT/PTA: PTA     Number of PTA visits since last PT visit: 2     05/19/2023

## 2023-05-19 NOTE — SUBJECTIVE & OBJECTIVE
Interval History: naeo    Review of Systems   Reason unable to perform ROS: unable to talke, denies complaints with head shaking.   Objective:     Vital Signs (Most Recent):  Temp: 99 °F (37.2 °C) (05/19/23 1100)  Pulse: 98 (05/19/23 1100)  Resp: 18 (05/19/23 1100)  BP: (!) 147/57 (05/19/23 1100)  SpO2: 98 % (05/19/23 1100) Vital Signs (24h Range):  Temp:  [97.9 °F (36.6 °C)-100 °F (37.8 °C)] 99 °F (37.2 °C)  Pulse:  [79-98] 98  Resp:  [17-18] 18  SpO2:  [96 %-99 %] 98 %  BP: (107-185)/(57-93) 147/57     Weight: 56.9 kg (125 lb 7.1 oz)  Body mass index is 19.07 kg/m².    Intake/Output Summary (Last 24 hours) at 5/19/2023 1254  Last data filed at 5/19/2023 0620  Gross per 24 hour   Intake 980 ml   Output 1500 ml   Net -520 ml         Physical Exam  Vitals reviewed.   Constitutional:       Appearance: Normal appearance.   HENT:      Head: Normocephalic and atraumatic.      Nose: Nose normal.   Eyes:      Conjunctiva/sclera: Conjunctivae normal.   Neck:      Trachea: Trachea normal.   Cardiovascular:      Rate and Rhythm: Normal rate and regular rhythm.      Pulses: Normal pulses.      Heart sounds: Normal heart sounds.   Pulmonary:      Effort: Pulmonary effort is normal.      Breath sounds: Normal breath sounds and air entry.   Abdominal:      General: Bowel sounds are normal.      Palpations: Abdomen is soft.   Musculoskeletal:      Right lower leg: No edema.      Left lower leg: No edema.   Skin:     General: Skin is warm and dry.   Neurological:      Mental Status: He is alert. Mental status is at baseline.      Cranial Nerves: Cranial nerves 2-12 are intact.      Comments: Grossly normal motor and sensory function without focal deficit appreciated.   Psychiatric:         Mood and Affect: Affect normal.         Behavior: Behavior is cooperative.      Comments: Non verbal           Significant Labs: All pertinent labs within the past 24 hours have been reviewed.    Significant Imaging: I have reviewed all pertinent  imaging results/findings within the past 24 hours.

## 2023-05-20 LAB
ALBUMIN SERPL BCP-MCNC: 2.4 G/DL (ref 3.5–5)
ANION GAP SERPL CALCULATED.3IONS-SCNC: 12 MMOL/L (ref 7–16)
BUN SERPL-MCNC: 57 MG/DL (ref 7–18)
BUN/CREAT SERPL: 9 (ref 6–20)
CALCIUM SERPL-MCNC: 8.6 MG/DL (ref 8.5–10.1)
CHLORIDE SERPL-SCNC: 101 MMOL/L (ref 98–107)
CO2 SERPL-SCNC: 29 MMOL/L (ref 21–32)
CREAT SERPL-MCNC: 6.05 MG/DL (ref 0.7–1.3)
EGFR (NO RACE VARIABLE) (RUSH/TITUS): 9 ML/MIN/1.73M2
GLUCOSE SERPL-MCNC: 108 MG/DL (ref 70–105)
GLUCOSE SERPL-MCNC: 97 MG/DL (ref 74–106)
HGB BLD-MCNC: 7.3 G/DL (ref 13.5–18)
PHOSPHATE SERPL-MCNC: 4.7 MG/DL (ref 2.5–4.5)
POTASSIUM SERPL-SCNC: 4.1 MMOL/L (ref 3.5–5.1)
SODIUM SERPL-SCNC: 138 MMOL/L (ref 136–145)

## 2023-05-20 PROCEDURE — 11000001 HC ACUTE MED/SURG PRIVATE ROOM

## 2023-05-20 PROCEDURE — 25000003 PHARM REV CODE 250: Performed by: INTERNAL MEDICINE

## 2023-05-20 PROCEDURE — 99232 PR SUBSEQUENT HOSPITAL CARE,LEVL II: ICD-10-PCS | Mod: ,,, | Performed by: STUDENT IN AN ORGANIZED HEALTH CARE EDUCATION/TRAINING PROGRAM

## 2023-05-20 PROCEDURE — 25000003 PHARM REV CODE 250: Performed by: NURSE PRACTITIONER

## 2023-05-20 PROCEDURE — 94761 N-INVAS EAR/PLS OXIMETRY MLT: CPT

## 2023-05-20 PROCEDURE — 85018 HEMOGLOBIN: CPT | Performed by: STUDENT IN AN ORGANIZED HEALTH CARE EDUCATION/TRAINING PROGRAM

## 2023-05-20 PROCEDURE — 99232 SBSQ HOSP IP/OBS MODERATE 35: CPT | Mod: ,,, | Performed by: STUDENT IN AN ORGANIZED HEALTH CARE EDUCATION/TRAINING PROGRAM

## 2023-05-20 PROCEDURE — 82962 GLUCOSE BLOOD TEST: CPT

## 2023-05-20 PROCEDURE — 80069 RENAL FUNCTION PANEL: CPT | Performed by: STUDENT IN AN ORGANIZED HEALTH CARE EDUCATION/TRAINING PROGRAM

## 2023-05-20 RX ADMIN — ASPIRIN 81 MG: 81 TABLET, COATED ORAL at 09:05

## 2023-05-20 RX ADMIN — ATORVASTATIN CALCIUM 80 MG: 80 TABLET, FILM COATED ORAL at 08:05

## 2023-05-20 NOTE — PROGRESS NOTES
[facility] Medicine  Progress Note    Patient Name: Pardeep Collins  MRN: [id number]  Patient Class: IP- Inpatient   Admission Date: 5/12/2023  Length of Stay: 6 days  Attending Physician: Robert Pride DO  Primary Care Provider: Primary Doctor No        Subjective:     Principal Problem:Episode of transient neurologic symptoms        HPI:  Patient is an 82-year-old male with a history of essential hypertension and ESRD on HD on TTS with associated anemia who presents to the emergency room with transient episodes of slurred speech and right-sided weakness.  Patient stated that he was in his usual state of health until the today around 11:00 a.m. when he suddenly experienced slurred speech and right-sided weakness.  He stated that these symptoms lasted about 5 minute with complete resolution and did not think much of it when he experienced same symptoms at around 2:00 p.m. which lasted about 10 minutes again with complete resolution without any intervention.  Patient was subsequently brought in by EMS for evaluation.    On presentation, vital signs were stable and patient was afebrile.  Physical examination revealed NIHSS of 0 points.  Ensuing workup including CT of head was unremarkable.  Tele neurologist was consulted who recommended CTA of the head and neck and provided that there is no evidence of LVO, patient needs be started on DAPT with loading dose of Plavix and high-intensity statin.  Patient will be admitted for further evaluation and intervention.      Overview/Hospital Course:  5/17-chart reviewed, admitted for TIA then worsened symptoms s/p TNK without improvement.  Plan now for PT/OT and placement  5/18-av fistula bled last night, required transfusion today.  Surgery consulted  5/19-need to see his hb remain stable after transfusion prior to discharge.   5/20-follow HB      Interval History: naeo    Review of Systems   Reason unable to perform ROS: unable to  talke, denies complaints with head shaking.   Objective:     Vital Signs (Most Recent):  Temp: 98.4 °F (36.9 °C) (05/20/23 0705)  Pulse: 71 (05/20/23 0705)  Resp: 18 (05/20/23 0705)  BP: (!) 153/76 (05/20/23 0705)  SpO2: 97 % (05/20/23 0705) Vital Signs (24h Range):  Temp:  [97.7 °F (36.5 °C)-99 °F (37.2 °C)] 98.4 °F (36.9 °C)  Pulse:  [65-98] 71  Resp:  [18] 18  SpO2:  [97 %-99 %] 97 %  BP: (125-155)/(57-86) 153/76     Weight: 56.9 kg (125 lb 7.1 oz)  Body mass index is 19.07 kg/m².  No intake or output data in the 24 hours ending 05/20/23 0857        Physical Exam  Vitals reviewed.   Constitutional:       Appearance: Normal appearance.   HENT:      Head: Normocephalic and atraumatic.      Nose: Nose normal.   Eyes:      Conjunctiva/sclera: Conjunctivae normal.   Neck:      Trachea: Trachea normal.   Cardiovascular:      Rate and Rhythm: Normal rate and regular rhythm.      Pulses: Normal pulses.      Heart sounds: Normal heart sounds.   Pulmonary:      Effort: Pulmonary effort is normal.      Breath sounds: Normal breath sounds and air entry.   Abdominal:      General: Bowel sounds are normal.      Palpations: Abdomen is soft.   Musculoskeletal:      Right lower leg: No edema.      Left lower leg: No edema.   Skin:     General: Skin is warm and dry.   Neurological:      Mental Status: He is alert. Mental status is at baseline.      Cranial Nerves: Cranial nerves 2-12 are intact.      Comments: Grossly normal motor and sensory function without focal deficit appreciated.   Psychiatric:         Mood and Affect: Affect normal.         Behavior: Behavior is cooperative.      Comments: Non verbal           Significant Labs: All pertinent labs within the past 24 hours have been reviewed.    Significant Imaging: I have reviewed all pertinent imaging results/findings within the past 24 hours.      Assessment/Plan:      * Episode of transient neurologic symptoms    Patient 2 episodes of transient neurological deficits  including symptoms of dysarthria and right-sided weakness.  When patient arrived in ED patient had NIHSS of 0.  Initial workup including CT of the head was unremarkable.  Tele neurologist recommended CTA of head and neck and provided that there is no evidence of LVO, patient will be started on DAPT with loading dose of Plavix and a high-intensity statin.  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 Aceves and dialyzes on Tuesdays Thursdays and Saturday.  Will consult Dr. Aceves        VTE Risk Mitigation (From admission, onward)         Ordered     IP VTE HIGH RISK PATIENT  Once         05/13/23 0041     Place sequential compression device  Until discontinued         05/13/23 0041                Discharge Planning   RASHAD:      Code Status: Full Code   Is the patient medically ready for discharge?:     Reason for patient still in hospital (select all that apply): Treatment  Discharge Plan A: Home, Home Health                  Robert Pride DO  Department of [facility] Medicine   [facility] - [address] [place] [address]

## 2023-05-20 NOTE — NURSING
Dialysis nurse called floor and states he was unable to access pt's right upper arm fistula. Pt brought back to room from dialysis via bed.      1518: Dr. Pride notified. No orders received at this time.

## 2023-05-20 NOTE — NURSING
Unable to access fistula on upper right arm for dialysis today, has a good bruit and thrill but no blood flow when accessing fistula. Got flashback on one needle but it was oozing too much blood out around the needle.

## 2023-05-20 NOTE — PT/OT/SLP PROGRESS
"Speech Language Pathology      Pardeep Collins  MRN: [id number]    Patient not seen today secondary to Patient unwilling to participate, Patient fatigue. Will follow-up 5/21/2023. Attempted to encourage patient to participate with therapy; however, he kept shaking his head "no" when asked if he was ready for speech therapy.    "

## 2023-05-20 NOTE — SUBJECTIVE & OBJECTIVE
Interval History: naeo    Review of Systems   Reason unable to perform ROS: unable to talke, denies complaints with head shaking.   Objective:     Vital Signs (Most Recent):  Temp: 98.4 °F (36.9 °C) (05/20/23 0705)  Pulse: 71 (05/20/23 0705)  Resp: 18 (05/20/23 0705)  BP: (!) 153/76 (05/20/23 0705)  SpO2: 97 % (05/20/23 0705) Vital Signs (24h Range):  Temp:  [97.7 °F (36.5 °C)-99 °F (37.2 °C)] 98.4 °F (36.9 °C)  Pulse:  [65-98] 71  Resp:  [18] 18  SpO2:  [97 %-99 %] 97 %  BP: (125-155)/(57-86) 153/76     Weight: 56.9 kg (125 lb 7.1 oz)  Body mass index is 19.07 kg/m².  No intake or output data in the 24 hours ending 05/20/23 0857        Physical Exam  Vitals reviewed.   Constitutional:       Appearance: Normal appearance.   HENT:      Head: Normocephalic and atraumatic.      Nose: Nose normal.   Eyes:      Conjunctiva/sclera: Conjunctivae normal.   Neck:      Trachea: Trachea normal.   Cardiovascular:      Rate and Rhythm: Normal rate and regular rhythm.      Pulses: Normal pulses.      Heart sounds: Normal heart sounds.   Pulmonary:      Effort: Pulmonary effort is normal.      Breath sounds: Normal breath sounds and air entry.   Abdominal:      General: Bowel sounds are normal.      Palpations: Abdomen is soft.   Musculoskeletal:      Right lower leg: No edema.      Left lower leg: No edema.   Skin:     General: Skin is warm and dry.   Neurological:      Mental Status: He is alert. Mental status is at baseline.      Cranial Nerves: Cranial nerves 2-12 are intact.      Comments: Grossly normal motor and sensory function without focal deficit appreciated.   Psychiatric:         Mood and Affect: Affect normal.         Behavior: Behavior is cooperative.      Comments: Non verbal           Significant Labs: All pertinent labs within the past 24 hours have been reviewed.    Significant Imaging: I have reviewed all pertinent imaging results/findings within the past 24 hours.

## 2023-05-21 LAB
ALBUMIN SERPL BCP-MCNC: 2.4 G/DL (ref 3.5–5)
ANION GAP SERPL CALCULATED.3IONS-SCNC: 16 MMOL/L (ref 7–16)
BUN SERPL-MCNC: 75 MG/DL (ref 7–18)
BUN/CREAT SERPL: 11 (ref 6–20)
CALCIUM SERPL-MCNC: 8.8 MG/DL (ref 8.5–10.1)
CHLORIDE SERPL-SCNC: 104 MMOL/L (ref 98–107)
CO2 SERPL-SCNC: 27 MMOL/L (ref 21–32)
CREAT SERPL-MCNC: 7.1 MG/DL (ref 0.7–1.3)
EGFR (NO RACE VARIABLE) (RUSH/TITUS): 7 ML/MIN/1.73M2
GLUCOSE SERPL-MCNC: 88 MG/DL (ref 74–106)
HGB BLD-MCNC: 7.2 G/DL (ref 13.5–18)
PHOSPHATE SERPL-MCNC: 5 MG/DL (ref 2.5–4.5)
POTASSIUM SERPL-SCNC: 4.3 MMOL/L (ref 3.5–5.1)
SODIUM SERPL-SCNC: 143 MMOL/L (ref 136–145)

## 2023-05-21 PROCEDURE — 99232 PR SUBSEQUENT HOSPITAL CARE,LEVL II: ICD-10-PCS | Mod: ,,, | Performed by: STUDENT IN AN ORGANIZED HEALTH CARE EDUCATION/TRAINING PROGRAM

## 2023-05-21 PROCEDURE — 80069 RENAL FUNCTION PANEL: CPT | Performed by: STUDENT IN AN ORGANIZED HEALTH CARE EDUCATION/TRAINING PROGRAM

## 2023-05-21 PROCEDURE — 99232 SBSQ HOSP IP/OBS MODERATE 35: CPT | Mod: ,,, | Performed by: STUDENT IN AN ORGANIZED HEALTH CARE EDUCATION/TRAINING PROGRAM

## 2023-05-21 PROCEDURE — 85018 HEMOGLOBIN: CPT | Performed by: STUDENT IN AN ORGANIZED HEALTH CARE EDUCATION/TRAINING PROGRAM

## 2023-05-21 PROCEDURE — 11000001 HC ACUTE MED/SURG PRIVATE ROOM

## 2023-05-21 PROCEDURE — 25000003 PHARM REV CODE 250: Performed by: INTERNAL MEDICINE

## 2023-05-21 RX ADMIN — ATORVASTATIN CALCIUM 80 MG: 80 TABLET, FILM COATED ORAL at 09:05

## 2023-05-21 NOTE — PT/OT/SLP PROGRESS
"Speech Language Pathology      Pardeep Collins  MRN: [id number]    Patient not seen today secondary to Patient unwilling to participate despite max encouragement from SLP. Patient just kept shaking his head "no." Spoke with his nurse (Areli) and she relayed the information to Dr. Pride about patient's unwillingness to participate in speech therapy. Will follow-up 5/22/2023.    "

## 2023-05-21 NOTE — SUBJECTIVE & OBJECTIVE
Interval History: naeo    Review of Systems   Reason unable to perform ROS: unable to talke, denies complaints with head shaking.   Objective:     Vital Signs (Most Recent):  Temp: 98.5 °F (36.9 °C) (05/21/23 0705)  Pulse: 80 (05/21/23 0705)  Resp: 18 (05/21/23 0705)  BP: (!) 141/72 (05/21/23 0705)  SpO2: 97 % (05/21/23 0705) Vital Signs (24h Range):  Temp:  [98.3 °F (36.8 °C)-99.3 °F (37.4 °C)] 98.5 °F (36.9 °C)  Pulse:  [80-84] 80  Resp:  [18-20] 18  SpO2:  [96 %-98 %] 97 %  BP: (108-153)/(66-77) 141/72     Weight: 56.9 kg (125 lb 7.1 oz)  Body mass index is 19.07 kg/m².    Intake/Output Summary (Last 24 hours) at 5/21/2023 0829  Last data filed at 5/21/2023 0516  Gross per 24 hour   Intake --   Output 1 ml   Net -1 ml           Physical Exam  Vitals reviewed.   Constitutional:       Appearance: Normal appearance.   HENT:      Head: Normocephalic and atraumatic.      Nose: Nose normal.   Eyes:      Conjunctiva/sclera: Conjunctivae normal.   Neck:      Trachea: Trachea normal.   Cardiovascular:      Rate and Rhythm: Normal rate and regular rhythm.      Pulses: Normal pulses.      Heart sounds: Normal heart sounds.   Pulmonary:      Effort: Pulmonary effort is normal.      Breath sounds: Normal breath sounds and air entry.   Abdominal:      General: Bowel sounds are normal.      Palpations: Abdomen is soft.   Musculoskeletal:      Right lower leg: No edema.      Left lower leg: No edema.   Skin:     General: Skin is warm and dry.   Neurological:      Mental Status: He is alert. Mental status is at baseline.      Cranial Nerves: Cranial nerves 2-12 are intact.      Comments: Grossly normal motor and sensory function without focal deficit appreciated.   Psychiatric:         Mood and Affect: Affect normal.         Behavior: Behavior is cooperative.      Comments: Non verbal           Significant Labs: All pertinent labs within the past 24 hours have been reviewed.    Significant Imaging: I have reviewed all pertinent  imaging results/findings within the past 24 hours.

## 2023-05-21 NOTE — PROGRESS NOTES
[facility]  Nephrology  Progress Note    Patient Name: Pardeep Collins  MRN: [id number]  Admission Date: 5/12/2023  Hospital Length of Stay: 6 days  Attending Provider: Robert Pride DO   Primary Care Physician: Primary Doctor No  Principal Problem:Episode of transient neurologic symptoms    Consults  Subjective:     Interval History: The dialysis nurse was unable to successfully cannulate the patient hemodialysis angioaccess today.    Review of patient's allergies indicates:  No Known Allergies  Current Facility-Administered Medications   Medication Frequency    0.9%  NaCl infusion (for blood administration) Q24H PRN    0.9%  NaCl infusion PRN    acetaminophen tablet 650 mg Q4H PRN    aspirin EC tablet 81 mg Daily    atorvastatin tablet 80 mg QHS    docusate sodium capsule 100 mg BID PRN    HYDROcodone-acetaminophen 5-325 mg per tablet 1 tablet Q6H PRN    labetaloL injection 10 mg Q4H PRN    naloxone 0.4 mg/mL injection 0.02 mg PRN    ondansetron injection 4 mg Q8H PRN    polyethylene glycol packet 17 g Daily    sodium chloride 0.9% flush 10 mL Q12H PRN       Objective:     Vital Signs (Most Recent):  Temp: 98.3 °F (36.8 °C) (05/20/23 1545)  Pulse: 84 (05/20/23 1545)  Resp: 18 (05/20/23 1545)  BP: (!) 153/77 (05/20/23 1545)  SpO2: 97 % (05/20/23 1545) Vital Signs (24h Range):  Temp:  [97.7 °F (36.5 °C)-98.4 °F (36.9 °C)] 98.3 °F (36.8 °C)  Pulse:  [65-85] 84  Resp:  [18] 18  SpO2:  [97 %-99 %] 97 %  BP: (125-155)/(62-86) 153/77     Weight: 56.9 kg (125 lb 7.1 oz) (05/15/23 0243)  Body mass index is 19.07 kg/m².  Body surface area is 1.65 meters squared.    No intake/output data recorded.    Physical Exam  Lungs-clear  Cor-2/6 systolic murmur  Abd-soft,nontender    Significant Labs:sureCBC:   Recent Labs   Lab 05/19/23  0215 05/20/23  0646   WBC 5.62  --    RBC 2.49*  --    HGB 7.8* 7.3*   HCT 24.1*  --    *  --    MCV 96.8*  --    MCH 31.3*  --    MCHC 32.4  --      CMP:    Recent Labs   Lab 05/18/23  0849 05/19/23  0215 05/20/23  1525   GLU  --    < > 97   CALCIUM  --    < > 8.6   ALBUMIN  --   --  2.4*   NA  --    < > 138   K  --    < > 4.1   CO2  --    < > 29   CL  --    < > 101   BUN  --    < > 57*   CREATININE  --    < > 6.05*   BILITOT 0.4  --   --     < > = values in this interval not displayed.     All labs within the past 24 hours have been reviewed.    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 I will follow-up with patient. Please contact us if you have any additional questions.    Flaquito Boykin MD  Nephrology  [facility] - [address] [place] [address]

## 2023-05-21 NOTE — NURSING
Dr. Boykin on floor for rounds. Notified that pt did not get dialysis today due to dialysis nurse not being able to access av fistula. No orders received from MD.

## 2023-05-21 NOTE — PLAN OF CARE
Problem: Adult Inpatient Plan of Care  Goal: Patient-Specific Goal (Individualized)  Outcome: Ongoing, Progressing     Problem: Fall Injury Risk  Goal: Absence of Fall and Fall-Related Injury  Outcome: Ongoing, Progressing     Problem: Adjustment to Illness (Stroke, Ischemic/Transient Ischemic Attack)  Goal: Optimal Coping  Outcome: Ongoing, Progressing

## 2023-05-21 NOTE — ASSESSMENT & PLAN NOTE
Patient is under the care of Dr. Aceves and dialyzes on Tuesdays Thursdays and Saturday.  Will consult Dr. Aceves  Still issues with access

## 2023-05-21 NOTE — PROGRESS NOTES
[facility] Medicine  Progress Note    Patient Name: Pardeep Collins  MRN: [id number]  Patient Class: IP- Inpatient   Admission Date: 5/12/2023  Length of Stay: 7 days  Attending Physician: Robert Pride DO  Primary Care Provider: Primary Doctor No        Subjective:     Principal Problem:Episode of transient neurologic symptoms        HPI:  Patient is an 82-year-old male with a history of essential hypertension and ESRD on HD on TTS with associated anemia who presents to the emergency room with transient episodes of slurred speech and right-sided weakness.  Patient stated that he was in his usual state of health until the today around 11:00 a.m. when he suddenly experienced slurred speech and right-sided weakness.  He stated that these symptoms lasted about 5 minute with complete resolution and did not think much of it when he experienced same symptoms at around 2:00 p.m. which lasted about 10 minutes again with complete resolution without any intervention.  Patient was subsequently brought in by EMS for evaluation.    On presentation, vital signs were stable and patient was afebrile.  Physical examination revealed NIHSS of 0 points.  Ensuing workup including CT of head was unremarkable.  Tele neurologist was consulted who recommended CTA of the head and neck and provided that there is no evidence of LVO, patient needs be started on DAPT with loading dose of Plavix and high-intensity statin.  Patient will be admitted for further evaluation and intervention.      Overview/Hospital Course:  5/17-chart reviewed, admitted for TIA then worsened symptoms s/p TNK without improvement.  Plan now for PT/OT and placement  5/18-av fistula bled last night, required transfusion today.  Surgery consulted  5/19-need to see his hb remain stable after transfusion prior to discharge.   5/20-follow HB  5/21-having issues with dialysis access. No urgent indications to dialyze as far as I can  tell. Dr. Boykin following. Will get surgery to evaluate access      Interval History: naeo    Review of Systems   Reason unable to perform ROS: unable to talke, denies complaints with head shaking.   Objective:     Vital Signs (Most Recent):  Temp: 98.5 °F (36.9 °C) (05/21/23 0705)  Pulse: 80 (05/21/23 0705)  Resp: 18 (05/21/23 0705)  BP: (!) 141/72 (05/21/23 0705)  SpO2: 97 % (05/21/23 0705) Vital Signs (24h Range):  Temp:  [98.3 °F (36.8 °C)-99.3 °F (37.4 °C)] 98.5 °F (36.9 °C)  Pulse:  [80-84] 80  Resp:  [18-20] 18  SpO2:  [96 %-98 %] 97 %  BP: (108-153)/(66-77) 141/72     Weight: 56.9 kg (125 lb 7.1 oz)  Body mass index is 19.07 kg/m².    Intake/Output Summary (Last 24 hours) at 5/21/2023 0829  Last data filed at 5/21/2023 0516  Gross per 24 hour   Intake --   Output 1 ml   Net -1 ml           Physical Exam  Vitals reviewed.   Constitutional:       Appearance: Normal appearance.   HENT:      Head: Normocephalic and atraumatic.      Nose: Nose normal.   Eyes:      Conjunctiva/sclera: Conjunctivae normal.   Neck:      Trachea: Trachea normal.   Cardiovascular:      Rate and Rhythm: Normal rate and regular rhythm.      Pulses: Normal pulses.      Heart sounds: Normal heart sounds.   Pulmonary:      Effort: Pulmonary effort is normal.      Breath sounds: Normal breath sounds and air entry.   Abdominal:      General: Bowel sounds are normal.      Palpations: Abdomen is soft.   Musculoskeletal:      Right lower leg: No edema.      Left lower leg: No edema.   Skin:     General: Skin is warm and dry.   Neurological:      Mental Status: He is alert. Mental status is at baseline.      Cranial Nerves: Cranial nerves 2-12 are intact.      Comments: Grossly normal motor and sensory function without focal deficit appreciated.   Psychiatric:         Mood and Affect: Affect normal.         Behavior: Behavior is cooperative.      Comments: Non verbal           Significant Labs: All pertinent labs within the past 24 hours have  been reviewed.    Significant Imaging: I have reviewed all pertinent imaging results/findings within the past 24 hours.      Assessment/Plan:      * Episode of transient neurologic symptoms    Patient 2 episodes of transient neurological deficits including symptoms of dysarthria and right-sided weakness.  When patient arrived in ED patient had NIHSS of 0.  Initial workup including CT of the head was unremarkable.  Tele neurologist recommended CTA of head and neck and provided that there is no evidence of LVO, patient will be started on DAPT with loading dose of Plavix and a high-intensity statin.  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 Aceves and dialyzes on Tuesdays Thursdays and Saturday.  Will consult Dr. Aceves  Still issues with access      VTE Risk Mitigation (From admission, onward)         Ordered     IP VTE HIGH RISK PATIENT  Once         05/13/23 0041     Place sequential compression device  Until discontinued         05/13/23 0041                Discharge Planning   RASHAD:      Code Status: Full Code   Is the patient medically ready for discharge?:     Reason for patient still in hospital (select all that apply): Treatment  Discharge Plan A: Home, Home Health                  Robert Pride DO  Department of Hospital Medicine   [facility]

## 2023-05-22 VITALS
RESPIRATION RATE: 18 BRPM | SYSTOLIC BLOOD PRESSURE: 121 MMHG | TEMPERATURE: 98 F | WEIGHT: 125.44 LBS | HEIGHT: 68 IN | HEART RATE: 56 BPM | BODY MASS INDEX: 19.01 KG/M2 | OXYGEN SATURATION: 95 % | DIASTOLIC BLOOD PRESSURE: 43 MMHG

## 2023-05-22 LAB
ALBUMIN SERPL BCP-MCNC: 2.3 G/DL (ref 3.5–5)
ANION GAP SERPL CALCULATED.3IONS-SCNC: 16 MMOL/L (ref 7–16)
BUN SERPL-MCNC: 93 MG/DL (ref 7–18)
BUN/CREAT SERPL: 11 (ref 6–20)
CALCIUM SERPL-MCNC: 8.6 MG/DL (ref 8.5–10.1)
CHLORIDE SERPL-SCNC: 104 MMOL/L (ref 98–107)
CO2 SERPL-SCNC: 26 MMOL/L (ref 21–32)
CREAT SERPL-MCNC: 8.24 MG/DL (ref 0.7–1.3)
EGFR (NO RACE VARIABLE) (RUSH/TITUS): 6 ML/MIN/1.73M2
GLUCOSE SERPL-MCNC: 90 MG/DL (ref 74–106)
HGB BLD-MCNC: 7.2 G/DL (ref 13.5–18)
PHOSPHATE SERPL-MCNC: 5.5 MG/DL (ref 2.5–4.5)
POTASSIUM SERPL-SCNC: 4.3 MMOL/L (ref 3.5–5.1)
SODIUM SERPL-SCNC: 142 MMOL/L (ref 136–145)

## 2023-05-22 PROCEDURE — 86580 TB INTRADERMAL TEST: CPT | Performed by: STUDENT IN AN ORGANIZED HEALTH CARE EDUCATION/TRAINING PROGRAM

## 2023-05-22 PROCEDURE — 90935 HEMODIALYSIS ONE EVALUATION: CPT

## 2023-05-22 PROCEDURE — 80069 RENAL FUNCTION PANEL: CPT | Performed by: STUDENT IN AN ORGANIZED HEALTH CARE EDUCATION/TRAINING PROGRAM

## 2023-05-22 PROCEDURE — 94761 N-INVAS EAR/PLS OXIMETRY MLT: CPT

## 2023-05-22 PROCEDURE — 30200315 PPD INTRADERMAL TEST REV CODE 302: Performed by: STUDENT IN AN ORGANIZED HEALTH CARE EDUCATION/TRAINING PROGRAM

## 2023-05-22 PROCEDURE — 99239 PR HOSPITAL DISCHARGE DAY,>30 MIN: ICD-10-PCS | Mod: ,,, | Performed by: STUDENT IN AN ORGANIZED HEALTH CARE EDUCATION/TRAINING PROGRAM

## 2023-05-22 PROCEDURE — 99239 HOSP IP/OBS DSCHRG MGMT >30: CPT | Mod: ,,, | Performed by: STUDENT IN AN ORGANIZED HEALTH CARE EDUCATION/TRAINING PROGRAM

## 2023-05-22 PROCEDURE — 85018 HEMOGLOBIN: CPT | Performed by: STUDENT IN AN ORGANIZED HEALTH CARE EDUCATION/TRAINING PROGRAM

## 2023-05-22 PROCEDURE — 25000003 PHARM REV CODE 250: Performed by: INTERNAL MEDICINE

## 2023-05-22 RX ORDER — POLYETHYLENE GLYCOL 3350 17 G/17G
17 POWDER, FOR SOLUTION ORAL 2 TIMES DAILY PRN
Refills: 0
Start: 2023-05-22

## 2023-05-22 RX ORDER — ASPIRIN 81 MG/1
81 TABLET ORAL DAILY
Refills: 0
Start: 2023-05-22 | End: 2024-05-21

## 2023-05-22 RX ORDER — ATORVASTATIN CALCIUM 80 MG/1
80 TABLET, FILM COATED ORAL NIGHTLY
Qty: 90 TABLET | Refills: 3
Start: 2023-05-22 | End: 2024-05-21

## 2023-05-22 RX ADMIN — SODIUM CHLORIDE: 9 INJECTION, SOLUTION INTRAVENOUS at 09:05

## 2023-05-22 RX ADMIN — TUBERCULIN PURIFIED PROTEIN DERIVATIVE 5 UNITS: 5 INJECTION, SOLUTION INTRADERMAL at 03:05

## 2023-05-22 NOTE — PT/OT/SLP PROGRESS
Speech Language Pathology      Pardeep Collins  MRN: [id number]    Patient not seen today secondary to dialysis. Patient to discharge to swing bed after dialysis today. Will follow-up 5/23/23 if patient does note discharge.

## 2023-05-22 NOTE — ASSESSMENT & PLAN NOTE
Patient is under the care of Dr. Aceves and dialyzes on Tuesdays Thursdays and Saturday.  Will consult Dr. Aceves    No further issues with access

## 2023-05-22 NOTE — SUBJECTIVE & OBJECTIVE
Interval History: The patient is sitting up in bed.  He is awake and alert but non verbal.  He will shake or nod his head to yes or no questions.  He denies pain.  No shortness of breath or chest pain.    Review of patient's allergies indicates:  No Known Allergies  Current Facility-Administered Medications   Medication Frequency    0.9%  NaCl infusion (for blood administration) Q24H PRN    0.9%  NaCl infusion PRN    acetaminophen tablet 650 mg Q4H PRN    atorvastatin tablet 80 mg QHS    docusate sodium capsule 100 mg BID PRN    HYDROcodone-acetaminophen 5-325 mg per tablet 1 tablet Q6H PRN    labetaloL injection 10 mg Q4H PRN    naloxone 0.4 mg/mL injection 0.02 mg PRN    ondansetron injection 4 mg Q8H PRN    polyethylene glycol packet 17 g Daily    sodium chloride 0.9% flush 10 mL Q12H PRN    tuberculin injection 5 Units Once       Objective:     Vital Signs (Most Recent):  Temp: 98 °F (36.7 °C) (05/22/23 1225)  Pulse: (!) 56 (05/22/23 1225)  Resp: 18 (05/22/23 1225)  BP: (!) 121/43 (05/22/23 1225)  SpO2: 95 % (05/22/23 0809) Vital Signs (24h Range):  Temp:  [97.6 °F (36.4 °C)-99.5 °F (37.5 °C)] 98 °F (36.7 °C)  Pulse:  [56-96] 56  Resp:  [18-19] 18  SpO2:  [95 %-100 %] 95 %  BP: ()/(43-97) 121/43     Weight: 56.9 kg (125 lb 7.1 oz) (05/15/23 0243)  Body mass index is 19.07 kg/m².  Body surface area is 1.65 meters squared.    I/O last 3 completed shifts:  In: -   Out: 101 [Urine:100; Stool:1]     Physical Exam  Vitals reviewed.   HENT:      Head: Normocephalic and atraumatic.   Cardiovascular:      Rate and Rhythm: Regular rhythm.   Pulmonary:      Effort: Pulmonary effort is normal.   Abdominal:      General: Bowel sounds are normal.      Palpations: Abdomen is soft.   Neurological:      Mental Status: He is alert.   Psychiatric:         Mood and Affect: Mood normal.        Significant Labs:  BMP:   Recent Labs   Lab 05/22/23  0606   GLU 90      K 4.3      CO2 26   BUN 93*   CREATININE 8.24*    CALCIUM 8.6     CBC:   Recent Labs   Lab 05/19/23  0215 05/20/23  0646 05/22/23  0806   WBC 5.62  --   --    RBC 2.49*  --   --    HGB 7.8*   < > 7.2*   HCT 24.1*  --   --    *  --   --    MCV 96.8*  --   --    MCH 31.3*  --   --    MCHC 32.4  --   --     < > = values in this interval not displayed.        Significant Imaging:  Labs: Reviewed

## 2023-05-22 NOTE — PLAN OF CARE
Problem: Adult Inpatient Plan of Care  Goal: Plan of Care Review  Outcome: Ongoing, Progressing  Goal: Absence of Hospital-Acquired Illness or Injury  Outcome: Ongoing, Progressing     Problem: Fall Injury Risk  Goal: Absence of Fall and Fall-Related Injury  Outcome: Ongoing, Progressing

## 2023-05-22 NOTE — PT/OT/SLP PROGRESS
Physical Therapy      Patient Name:  Pardeep Collins   MRN:  [id number]    Patient not seen today secondary to Dialysis. Patient out of the room for dialysis and will discharge to swing bed once dialysis completed. Pt will follow up with pt if d/c plans fall through.

## 2023-05-22 NOTE — NURSING
Pt transported to [facility] in [place] by Ana Laura , Sister  Jazmin Lubin and daughter were both notified. Pt has cell phone ,$20:00, Dentures and walking cane that were given to him and [employer]. This was also commutated to the sister as well.

## 2023-05-22 NOTE — ASSESSMENT & PLAN NOTE
New to dialysis.  Volume status is stable.  5/18/2023.  Continue with scheduled hemodialysis for this patient.  5/19/2023.  Continue with current therapy.  5/22/2023.  Dialysis is going acceptable.

## 2023-05-22 NOTE — DISCHARGE SUMMARY
[facility] Hospital Medicine  Discharge Summary      Patient Name: Pardeep Collins  MRN: [id number]  KIERA: [id number]  Patient Class: IP- Inpatient  Admission Date: 5/12/2023  Hospital Length of Stay: 8 days  Discharge Date and Time:  05/22/2023 10:43 AM  Attending Physician: Robert Pride DO   Discharging Provider: Robert Pride DO  Primary Care Provider: Primary Doctor No    Primary Care Team: Networked reference to record PCT     HPI:   Patient is an 82-year-old male with a history of essential hypertension and ESRD on HD on TTS with associated anemia who presents to the emergency room with transient episodes of slurred speech and right-sided weakness.  Patient stated that he was in his usual state of health until the today around 11:00 a.m. when he suddenly experienced slurred speech and right-sided weakness.  He stated that these symptoms lasted about 5 minute with complete resolution and did not think much of it when he experienced same symptoms at around 2:00 p.m. which lasted about 10 minutes again with complete resolution without any intervention.  Patient was subsequently brought in by EMS for evaluation.    On presentation, vital signs were stable and patient was afebrile.  Physical examination revealed NIHSS of 0 points.  Ensuing workup including CT of head was unremarkable.  Tele neurologist was consulted who recommended CTA of the head and neck and provided that there is no evidence of LVO, patient needs be started on DAPT with loading dose of Plavix and high-intensity statin.  Patient will be admitted for further evaluation and intervention.      * No surgery found *      Hospital Course:   5/17-chart reviewed, admitted for TIA then worsened symptoms s/p TNK without improvement.  Plan now for PT/OT and placement  5/18-av fistula bled last night, required transfusion today.  Surgery consulted  5/19-need to see his hb remain stable after transfusion prior to  discharge.   5/20-follow HB  5/21-having issues with dialysis access. No urgent indications to dialyze as far as I can tell. Dr. Boykin following. Will get surgery to evaluate access    5/22-Hb stable, issues with AV fistula access have resolved. He is stable for DC to [facility]. Resume OP dialysis        Goals of Care Treatment Preferences:  Code Status: Full Code      Consults:   Consults (From admission, onward)        Status Ordering Provider     Inpatient consult to General Surgery  Once        Provider:  Berhane Pak MD    Completed ERNST SCHILLING     Inpatient consult to Social Work  Once        Provider:  (Not yet assigned)    Completed KIKE LLANOS     Inpatient Consult Tele-Vascular Neurology  Once        Provider:  (Not yet assigned)    Completed MYRON WOODARD     Consult to Telemedicine - Acute Stroke  Once        Provider:  (Not yet assigned)    Completed VAMSI PEMBERTON consult to case management  Once        Provider:  (Not yet assigned)    Completed ADOLFO AERLY JR     Consult to Telemedicine - Acute Stroke  Once        Provider:  (Not yet assigned)    Completed SPENCER GUERRERO          Neuro  * Episode of transient neurologic symptoms    Patient 2 episodes of transient neurological deficits including symptoms of dysarthria and right-sided weakness.  When patient arrived in ED patient had NIHSS of 0.  Initial workup including CT of the head was unremarkable.  Tele neurologist recommended CTA of head and neck and provided that there is no evidence of LVO, patient will be started on DAPT with loading dose of Plavix and a high-intensity statin.  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 Aceves and dialyzes on Tuesdays Thursdays and Saturday.  Will consult Dr. 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    Specialty: Family Medicine  Contact information:  [address] [facility] [address] [phone]                   Contact information for after-discharge care     Destination     [facility] .    Service: Skilled Nursing  Contact information:  [address] [place] [id number]  [phone]                           Patient Instructions:      Diet Cardiac     Diet Dysphagia Pureed       Significant Diagnostic Studies: Labs: All labs within the past 24 hours have been reviewed    Pending Diagnostic Studies:     Procedure Component Value Units Date/Time    EXTRA TUBES [[id number]] Collected: 05/13/23 1901    Order Status: Sent Lab Status: In process Updated: 05/13/23 1901    Specimen: Blood, Venous     Narrative:      The following orders were created for panel order  EXTRA TUBES.  Procedure                               Abnormality         Status                     ---------                               -----------         ------                     Light Green Top Hold[[id number]]                             In process                   Please view results for these tests on the individual orders.    EXTRA TUBES [[id number]] Collected: 05/12/23 2000    Order Status: Sent Lab Status: In process Updated: 05/12/23 2005    Specimen: Blood, Venous     Narrative:      The following orders were created for panel order EXTRA TUBES.  Procedure                               Abnormality         Status                     ---------                               -----------         ------                     Light Green Top Hold[[id number]]                             In process                 Gold Top Hold[[id number]]                                    In process                   Please view results for these tests on the individual orders.    Occult blood x 1, stool [[id number]]     Order Status: Sent Lab Status: No result     Specimen: Stool     X-Ray Chest AP Portable [[id number]]     Order Status: Sent Lab Status: No result          Medications:  Reconciled Home Medications:      Medication List      START taking these medications    aspirin 81 MG EC tablet  Commonly known as: ECOTRIN  Take 1 tablet (81 mg total) by mouth once daily.     atorvastatin 80 MG tablet  Commonly known as: LIPITOR  Take 1 tablet (80 mg total) by mouth every evening.     polyethylene glycol 17 gram Pwpk  Commonly known as: GLYCOLAX  Take 17 g by mouth 2 (two) times daily as needed.        CONTINUE taking these medications    desmopressin 0.2 MG tablet  Commonly known as: DDAVP  Take 1 tablet (200 mcg total) by mouth nightly.     furosemide 20 MG tablet  Commonly known as: LASIX  Take 1 tablet (20 mg total) by mouth 2 (two) times daily.     HYDROcodone-acetaminophen 7.5-325 mg per tablet  Commonly known as:  NORCO  Take 1 tablet by mouth every 6 (six) hours as needed for Pain.     NIFEdipine 30 MG Tbsr  Commonly known as: ADALAT CC  Take 30 mg by mouth once daily.            Indwelling Lines/Drains at time of discharge:   Lines/Drains/Airways     Drain  Duration                Hemodialysis AV Fistula 03/20/23 Right upper arm 63 days                Time spent on the discharge of patient: >30 minutes         Robert Pride DO  Department of Hospital Medicine  [facility] - [address] [place] [address]

## 2023-05-22 NOTE — DIALYSIS ROUNDING
Patient hd tx. Completed today. Hard stick of right upper arm hd site. Able to get needles placed correctly per supervisor juliette colon rn. Tx with no complications. Net fluid of 320ml pulled. B/p was low throughout tx.

## 2023-05-22 NOTE — PLAN OF CARE
[facility]  Discharge Final Note    Primary Care Provider: Primary Doctor No    Expected Discharge Date: 5/22/2023    Final Discharge Note (most recent)       Final Note - 05/22/23 1109          Final Note    Assessment Type Final Discharge Note     Anticipated Discharge Disposition Skilled Nursing Facility   [facility]    What phone number can be called within the next 1-3 days to see how you are doing after discharge? [phone]        Post-Acute Status    Post-Acute Authorization Placement     Post-Acute Placement Status Set-up Complete/Auth obtained     Patient choice form signed by patient/caregiver List with quality metrics by geographic area provided;List from CMS Compare     Discharge Delays None known at this time                     Important Message from Medicare  Important Message from Medicare regarding Discharge Appeal Rights: Explained to patient/caregiver     Date IMM was signed: 05/22/23  Time IMM was signed: 1012     Follow-up providers       Jen Real MD   Specialty: Family Medicine    [address] [facility] [place] [address]   Phone: [phone]       Next Steps: Follow up on 5/31/2023    Instructions: 10:00 am              After-discharge care                Destination       [facility]   Service: Skilled Nursing    [address] [place] [address] [place] [address]   Phone: [phone]                             LINDA informed physician that pt will need tb skin test and updated cxr. Pkt made and left with nurse. LINDA faxed updates, tb s/s, dc summary and orders to [facility]. LINDA will fax tb skin test and cxr once completed. Pt to dc today. No other needs.

## 2023-05-22 NOTE — PROGRESS NOTES
[facility]  Nephrology  Progress Note    Patient Name: Pardeep Collins  MRN: [id number]  Admission Date: 5/12/2023  Hospital Length of Stay: 7 days  Attending Provider: Robert Pride DO   Primary Care Physician: Primary Doctor No  Principal Problem:Episode of transient neurologic symptoms    Consults  Subjective:     Interval History: The patient was not dialyzed yesterday.    Review of patient's allergies indicates:  No Known Allergies  Current Facility-Administered Medications   Medication Frequency    0.9%  NaCl infusion (for blood administration) Q24H PRN    0.9%  NaCl infusion PRN    acetaminophen tablet 650 mg Q4H PRN    atorvastatin tablet 80 mg QHS    docusate sodium capsule 100 mg BID PRN    HYDROcodone-acetaminophen 5-325 mg per tablet 1 tablet Q6H PRN    labetaloL injection 10 mg Q4H PRN    naloxone 0.4 mg/mL injection 0.02 mg PRN    ondansetron injection 4 mg Q8H PRN    polyethylene glycol packet 17 g Daily    sodium chloride 0.9% flush 10 mL Q12H PRN       Objective:     Vital Signs (Most Recent):  Temp: 98 °F (36.7 °C) (05/21/23 1605)  Pulse: 92 (05/21/23 1605)  Resp: 18 (05/21/23 1605)  BP: (!) 128/97 (05/21/23 1605)  SpO2: 99 % (05/21/23 1605) Vital Signs (24h Range):  Temp:  [98 °F (36.7 °C)-98.7 °F (37.1 °C)] 98 °F (36.7 °C)  Pulse:  [80-92] 92  Resp:  [18-20] 18  SpO2:  [94 %-99 %] 99 %  BP: (128-152)/(70-97) 128/97     Weight: 56.9 kg (125 lb 7.1 oz) (05/15/23 0243)  Body mass index is 19.07 kg/m².  Body surface area is 1.65 meters squared.    I/O last 3 completed shifts:  In: -   Out: 101 [Urine:100; Stool:1]    Physical Exam  Lungs-clear  Cor-2/6 systolic murmur   Abd-soft  Significant Labs:sureCBC:   Recent Labs   Lab 05/19/23  0215 05/20/23  0646 05/21/23  0633   WBC 5.62  --   --    RBC 2.49*  --   --    HGB 7.8*   < > 7.2*   HCT 24.1*  --   --    *  --   --    MCV 96.8*  --   --    MCH 31.3*  --   --    MCHC 32.4  --   --     < > = values in this  interval not displayed.     CMP:   Recent Labs   Lab 05/18/23  0849 05/19/23  0215 05/21/23  0351   GLU  --    < > 88   CALCIUM  --    < > 8.8   ALBUMIN  --    < > 2.4*   NA  --    < > 143   K  --    < > 4.3   CO2  --    < > 27   CL  --    < > 104   BUN  --    < > 75*   CREATININE  --    < > 7.10*   BILITOT 0.4  --   --     < > = values in this interval not displayed.     All labs within the past 24 hours have been reviewed.    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    Thank you for your consult. I will follow-up with patient. Please contact us if you have any additional questions.    Flaquito Boykin MD  Nephrology  [facility] - [address] [place] [address]

## 2023-05-22 NOTE — PROGRESS NOTES
[facility]  Nephrology  Progress Note    Patient Name: Pardeep Collins  MRN: [id number]  Admission Date: 5/12/2023  Hospital Length of Stay: 8 days  Attending Provider: Robert Pride DO   Primary Care Physician: Primary Doctor No  Principal Problem:Episode of transient neurologic symptoms    Subjective:     HPI: This gentleman with history of HTN, anemia, gout who recently started dialysis within the past month.  The patient presented with dysarthria and weakness that started on yesterday.  He had further work up with CT head showed no acute stroke and has been admitted for TIA. CTA showed no evidence of large vessel occlusion. He is awake and alert. He is dysarthric. He denies any shortness or chest pain. Nephrology is consulted for renal issues.      Interval History: The patient is sitting up in bed.  He is awake and alert but non verbal.  He will shake or nod his head to yes or no questions.  He denies pain.  No shortness of breath or chest pain.    Review of patient's allergies indicates:  No Known Allergies  Current Facility-Administered Medications   Medication Frequency    0.9%  NaCl infusion (for blood administration) Q24H PRN    0.9%  NaCl infusion PRN    acetaminophen tablet 650 mg Q4H PRN    atorvastatin tablet 80 mg QHS    docusate sodium capsule 100 mg BID PRN    HYDROcodone-acetaminophen 5-325 mg per tablet 1 tablet Q6H PRN    labetaloL injection 10 mg Q4H PRN    naloxone 0.4 mg/mL injection 0.02 mg PRN    ondansetron injection 4 mg Q8H PRN    polyethylene glycol packet 17 g Daily    sodium chloride 0.9% flush 10 mL Q12H PRN    tuberculin injection 5 Units Once       Objective:     Vital Signs (Most Recent):  Temp: 98 °F (36.7 °C) (05/22/23 1225)  Pulse: (!) 56 (05/22/23 1225)  Resp: 18 (05/22/23 1225)  BP: (!) 121/43 (05/22/23 1225)  SpO2: 95 % (05/22/23 0809) Vital Signs (24h Range):  Temp:  [97.6 °F (36.4 °C)-99.5 °F (37.5 °C)] 98 °F (36.7 °C)  Pulse:   [56-96] 56  Resp:  [18-19] 18  SpO2:  [95 %-100 %] 95 %  BP: ()/(43-97) 121/43     Weight: 56.9 kg (125 lb 7.1 oz) (05/15/23 0243)  Body mass index is 19.07 kg/m².  Body surface area is 1.65 meters squared.    I/O last 3 completed shifts:  In: -   Out: 101 [Urine:100; Stool:1]     Physical Exam  Vitals reviewed.   HENT:      Head: Normocephalic and atraumatic.   Cardiovascular:      Rate and Rhythm: Regular rhythm.   Pulmonary:      Effort: Pulmonary effort is normal.   Abdominal:      General: Bowel sounds are normal.      Palpations: Abdomen is soft.   Neurological:      Mental Status: He is alert.   Psychiatric:         Mood and Affect: Mood normal.        Significant Labs:  BMP:   Recent Labs   Lab 05/22/23  0606   GLU 90      K 4.3      CO2 26   BUN 93*   CREATININE 8.24*   CALCIUM 8.6     CBC:   Recent Labs   Lab 05/19/23  0215 05/20/23  0646 05/22/23  0806   WBC 5.62  --   --    RBC 2.49*  --   --    HGB 7.8*   < > 7.2*   HCT 24.1*  --   --    *  --   --    MCV 96.8*  --   --    MCH 31.3*  --   --    MCHC 32.4  --   --     < > = values in this interval not displayed.        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  5/19/2023.  Continue with therapy.  5/22/2023.  Continue with rehab    Renal/  ESRD (end stage renal disease)  New to dialysis.  Volume status is stable.  5/18/2023.  Continue with scheduled hemodialysis for this patient.  5/19/2023.  Continue with current therapy.  5/22/2023.  Dialysis is going acceptable.        Thank you for your consult. I will follow-up with patient. Please contact us if you have any additional questions.    Chandler Aceves Jr, MD  Nephrology  [facility]

## 2023-05-28 ENCOUNTER — HOSPITAL ENCOUNTER (INPATIENT)
Facility: HOSPITAL | Age: 83
LOS: 8 days | Discharge: NURSING, REHAB OR LONG-TERM CARE | DRG: 280 | End: 2023-06-06
Attending: EMERGENCY MEDICINE | Admitting: INTERNAL MEDICINE
Payer: MEDICARE

## 2023-05-28 DIAGNOSIS — T82.838A: ICD-10-CM

## 2023-05-28 DIAGNOSIS — R00.0 TACHYCARDIA: ICD-10-CM

## 2023-05-28 DIAGNOSIS — T82.838A HEMORRHAGE OF ARTERIOVENOUS FISTULA, INITIAL ENCOUNTER: ICD-10-CM

## 2023-05-28 DIAGNOSIS — N18.6 ESRD (END STAGE RENAL DISEASE): ICD-10-CM

## 2023-05-28 DIAGNOSIS — R07.9 CHEST PAIN: ICD-10-CM

## 2023-05-28 DIAGNOSIS — T82.9XXA COMPLICATION OF ARTERIOVENOUS DIALYSIS FISTULA, INITIAL ENCOUNTER: Primary | ICD-10-CM

## 2023-05-28 DIAGNOSIS — T82.838S: ICD-10-CM

## 2023-05-28 DIAGNOSIS — R79.89 ELEVATED TROPONIN: ICD-10-CM

## 2023-05-28 DIAGNOSIS — R58 BLEEDING: ICD-10-CM

## 2023-05-28 DIAGNOSIS — I95.9 HYPOTENSION, UNSPECIFIED HYPOTENSION TYPE: ICD-10-CM

## 2023-05-28 LAB
ALBUMIN SERPL BCP-MCNC: 1.6 G/DL (ref 3.5–5)
ALBUMIN/GLOB SERPL: 0.5 {RATIO}
ALP SERPL-CCNC: 56 U/L (ref 45–115)
ALT SERPL W P-5'-P-CCNC: 18 U/L (ref 16–61)
ANION GAP SERPL CALCULATED.3IONS-SCNC: 21 MMOL/L (ref 7–16)
APTT PPP: 31.6 SECONDS (ref 25.2–37.3)
AST SERPL W P-5'-P-CCNC: 31 U/L (ref 15–37)
BACTERIA #/AREA URNS HPF: NORMAL /HPF
BASOPHILS # BLD AUTO: 0.03 K/UL (ref 0–0.2)
BASOPHILS NFR BLD AUTO: 0.3 % (ref 0–1)
BILIRUB SERPL-MCNC: 0.3 MG/DL (ref ?–1.2)
BILIRUB UR QL STRIP: NEGATIVE
BUN SERPL-MCNC: 48 MG/DL (ref 7–18)
BUN/CREAT SERPL: 7 (ref 6–20)
CALCIUM SERPL-MCNC: 8.4 MG/DL (ref 8.5–10.1)
CHLORIDE SERPL-SCNC: 101 MMOL/L (ref 98–107)
CLARITY UR: CLEAR
CO2 SERPL-SCNC: 19 MMOL/L (ref 21–32)
COLOR UR: ABNORMAL
CREAT SERPL-MCNC: 6.56 MG/DL (ref 0.7–1.3)
DIFFERENTIAL METHOD BLD: ABNORMAL
EGFR (NO RACE VARIABLE) (RUSH/TITUS): 8 ML/MIN/1.73M2
EOSINOPHIL # BLD AUTO: 0.24 K/UL (ref 0–0.5)
EOSINOPHIL NFR BLD AUTO: 2.3 % (ref 1–4)
ERYTHROCYTE [DISTWIDTH] IN BLOOD BY AUTOMATED COUNT: 14 % (ref 11.5–14.5)
GLOBULIN SER-MCNC: 3.1 G/DL (ref 2–4)
GLUCOSE SERPL-MCNC: 285 MG/DL (ref 74–106)
GLUCOSE UR STRIP-MCNC: 70 MG/DL
HCT VFR BLD AUTO: 21 % (ref 40–54)
HGB BLD-MCNC: 6.5 G/DL (ref 13.5–18)
HOLD SPECIMEN: NORMAL
IMM GRANULOCYTES # BLD AUTO: 0.04 K/UL (ref 0–0.04)
IMM GRANULOCYTES NFR BLD: 0.4 % (ref 0–0.4)
INR BLD: 1.41
KETONES UR STRIP-SCNC: NEGATIVE MG/DL
LEUKOCYTE ESTERASE UR QL STRIP: NEGATIVE
LYMPHOCYTES # BLD AUTO: 4.15 K/UL (ref 1–4.8)
LYMPHOCYTES NFR BLD AUTO: 40.4 % (ref 27–41)
MCH RBC QN AUTO: 31.3 PG (ref 27–31)
MCHC RBC AUTO-ENTMCNC: 31 G/DL (ref 32–36)
MCV RBC AUTO: 101 FL (ref 80–96)
MONOCYTES # BLD AUTO: 0.51 K/UL (ref 0–0.8)
MONOCYTES NFR BLD AUTO: 5 % (ref 2–6)
MPC BLD CALC-MCNC: 12.5 FL (ref 9.4–12.4)
MUCOUS THREADS #/AREA URNS HPF: NORMAL /HPF
NEUTROPHILS # BLD AUTO: 5.3 K/UL (ref 1.8–7.7)
NEUTROPHILS NFR BLD AUTO: 51.6 % (ref 53–65)
NITRITE UR QL STRIP: NEGATIVE
NRBC # BLD AUTO: 0 X10E3/UL
NRBC, AUTO (.00): 0 %
PH UR STRIP: 8 PH UNITS
PLATELET # BLD AUTO: 151 K/UL (ref 150–400)
POTASSIUM SERPL-SCNC: 4.4 MMOL/L (ref 3.5–5.1)
PROT SERPL-MCNC: 4.7 G/DL (ref 6.4–8.2)
PROT UR QL STRIP: 300
PROTHROMBIN TIME: 16.7 SECONDS (ref 11.7–14.7)
RBC # BLD AUTO: 2.08 M/UL (ref 4.6–6.2)
RBC # UR STRIP: NEGATIVE /UL
RBC #/AREA URNS HPF: NORMAL /HPF
SODIUM SERPL-SCNC: 137 MMOL/L (ref 136–145)
SP GR UR STRIP: 1.01
SQUAMOUS #/AREA URNS LPF: NORMAL /LPF
TRICHOMONAS #/AREA URNS HPF: NORMAL /HPF
TROPONIN I SERPL DL<=0.01 NG/ML-MCNC: 31.2 PG/ML
UROBILINOGEN UR STRIP-ACNC: NORMAL MG/DL
WBC # BLD AUTO: 10.27 K/UL (ref 4.5–11)
WBC #/AREA URNS HPF: NORMAL /HPF
YEAST #/AREA URNS HPF: NORMAL /HPF

## 2023-05-28 PROCEDURE — 84484 ASSAY OF TROPONIN QUANT: CPT | Performed by: EMERGENCY MEDICINE

## 2023-05-28 PROCEDURE — 93005 ELECTROCARDIOGRAM TRACING: CPT

## 2023-05-28 PROCEDURE — 86900 BLOOD TYPING SEROLOGIC ABO: CPT | Mod: 91 | Performed by: INTERNAL MEDICINE

## 2023-05-28 PROCEDURE — 85025 COMPLETE CBC W/AUTO DIFF WBC: CPT | Performed by: EMERGENCY MEDICINE

## 2023-05-28 PROCEDURE — 81001 URINALYSIS AUTO W/SCOPE: CPT | Performed by: EMERGENCY MEDICINE

## 2023-05-28 PROCEDURE — 99285 EMERGENCY DEPT VISIT HI MDM: CPT | Mod: GC,,, | Performed by: EMERGENCY MEDICINE

## 2023-05-28 PROCEDURE — 85730 THROMBOPLASTIN TIME PARTIAL: CPT | Performed by: EMERGENCY MEDICINE

## 2023-05-28 PROCEDURE — 80053 COMPREHEN METABOLIC PANEL: CPT | Performed by: EMERGENCY MEDICINE

## 2023-05-28 PROCEDURE — 63600175 PHARM REV CODE 636 W HCPCS: Performed by: EMERGENCY MEDICINE

## 2023-05-28 PROCEDURE — 25000003 PHARM REV CODE 250

## 2023-05-28 PROCEDURE — 86923 COMPATIBILITY TEST ELECTRIC: CPT | Mod: 91 | Performed by: EMERGENCY MEDICINE

## 2023-05-28 PROCEDURE — 86901 BLOOD TYPING SEROLOGIC RH(D): CPT | Mod: 91 | Performed by: INTERNAL MEDICINE

## 2023-05-28 PROCEDURE — 85610 PROTHROMBIN TIME: CPT | Performed by: EMERGENCY MEDICINE

## 2023-05-28 PROCEDURE — 99285 PR EMERGENCY DEPT VISIT,LEVEL V: ICD-10-PCS | Mod: GC,,, | Performed by: EMERGENCY MEDICINE

## 2023-05-28 PROCEDURE — 93010 EKG 12-LEAD: ICD-10-PCS | Mod: ,,, | Performed by: INTERNAL MEDICINE

## 2023-05-28 PROCEDURE — 86850 RBC ANTIBODY SCREEN: CPT | Performed by: INTERNAL MEDICINE

## 2023-05-28 PROCEDURE — 99285 EMERGENCY DEPT VISIT HI MDM: CPT | Mod: 25

## 2023-05-28 PROCEDURE — 93010 ELECTROCARDIOGRAM REPORT: CPT | Mod: ,,, | Performed by: INTERNAL MEDICINE

## 2023-05-28 PROCEDURE — 96360 HYDRATION IV INFUSION INIT: CPT

## 2023-05-28 PROCEDURE — 86900 BLOOD TYPING SEROLOGIC ABO: CPT | Performed by: INTERNAL MEDICINE

## 2023-05-28 PROCEDURE — 86900 BLOOD TYPING SEROLOGIC ABO: CPT | Mod: 91 | Performed by: EMERGENCY MEDICINE

## 2023-05-28 PROCEDURE — 86923 COMPATIBILITY TEST ELECTRIC: CPT | Mod: 91 | Performed by: INTERNAL MEDICINE

## 2023-05-28 RX ORDER — HYDROCODONE BITARTRATE AND ACETAMINOPHEN 500; 5 MG/1; MG/1
TABLET ORAL
Status: DISCONTINUED | OUTPATIENT
Start: 2023-05-28 | End: 2023-06-06 | Stop reason: HOSPADM

## 2023-05-28 RX ORDER — SODIUM CHLORIDE 9 MG/ML
INJECTION, SOLUTION INTRAVENOUS
Status: COMPLETED
Start: 2023-05-28 | End: 2023-05-28

## 2023-05-28 RX ADMIN — SODIUM CHLORIDE, POTASSIUM CHLORIDE, SODIUM LACTATE AND CALCIUM CHLORIDE 1000 ML: 600; 310; 30; 20 INJECTION, SOLUTION INTRAVENOUS at 09:05

## 2023-05-28 RX ADMIN — SODIUM CHLORIDE, POTASSIUM CHLORIDE, SODIUM LACTATE AND CALCIUM CHLORIDE 1000 ML: 600; 310; 30; 20 INJECTION, SOLUTION INTRAVENOUS at 10:05

## 2023-05-28 RX ADMIN — SODIUM CHLORIDE: 9 INJECTION, SOLUTION INTRAVENOUS at 09:05

## 2023-05-29 PROBLEM — T82.9XXA COMPLICATION OF ARTERIOVENOUS DIALYSIS FISTULA: Status: ACTIVE | Noted: 2023-05-29

## 2023-05-29 PROBLEM — R58 BLEEDING: Status: ACTIVE | Noted: 2023-05-29

## 2023-05-29 PROBLEM — T82.838A: Status: ACTIVE | Noted: 2023-05-29

## 2023-05-29 LAB
ABO + RH BLD: NORMAL
ABO + RH BLD: NORMAL
ANION GAP SERPL CALCULATED.3IONS-SCNC: 15 MMOL/L (ref 7–16)
ANISOCYTOSIS BLD QL SMEAR: ABNORMAL
BASOPHILS # BLD AUTO: 0.03 K/UL (ref 0–0.2)
BASOPHILS NFR BLD AUTO: 0.2 % (ref 0–1)
BLD PROD TYP BPU: NORMAL
BLD PROD TYP BPU: NORMAL
BLOOD UNIT EXPIRATION DATE: NORMAL
BLOOD UNIT EXPIRATION DATE: NORMAL
BLOOD UNIT TYPE CODE: 5100
BLOOD UNIT TYPE CODE: 5100
BUN SERPL-MCNC: 55 MG/DL (ref 7–18)
BUN/CREAT SERPL: 8 (ref 6–20)
CALCIUM SERPL-MCNC: 8.7 MG/DL (ref 8.5–10.1)
CHLORIDE SERPL-SCNC: 98 MMOL/L (ref 98–107)
CO2 SERPL-SCNC: 27 MMOL/L (ref 21–32)
CREAT SERPL-MCNC: 6.61 MG/DL (ref 0.7–1.3)
CROSSMATCH INTERPRETATION: NORMAL
CROSSMATCH INTERPRETATION: NORMAL
DIFFERENTIAL METHOD BLD: ABNORMAL
DISPENSE STATUS: NORMAL
DISPENSE STATUS: NORMAL
EGFR (NO RACE VARIABLE) (RUSH/TITUS): 8 ML/MIN/1.73M2
EOSINOPHIL # BLD AUTO: 0.01 K/UL (ref 0–0.5)
EOSINOPHIL NFR BLD AUTO: 0.1 % (ref 1–4)
ERYTHROCYTE [DISTWIDTH] IN BLOOD BY AUTOMATED COUNT: 15.7 % (ref 11.5–14.5)
GLUCOSE SERPL-MCNC: 154 MG/DL (ref 70–105)
GLUCOSE SERPL-MCNC: 157 MG/DL (ref 70–105)
GLUCOSE SERPL-MCNC: 163 MG/DL (ref 70–105)
GLUCOSE SERPL-MCNC: 179 MG/DL (ref 74–106)
HCT VFR BLD AUTO: 25.9 % (ref 40–54)
HCT VFR BLD AUTO: 26.5 % (ref 40–54)
HGB BLD-MCNC: 8.8 G/DL (ref 13.5–18)
HGB BLD-MCNC: 9.1 G/DL (ref 13.5–18)
IMM GRANULOCYTES # BLD AUTO: 0.12 K/UL (ref 0–0.04)
IMM GRANULOCYTES NFR BLD: 0.6 % (ref 0–0.4)
INDIRECT COOMBS: NORMAL
INDIRECT COOMBS: NORMAL
LYMPHOCYTES # BLD AUTO: 1.07 K/UL (ref 1–4.8)
LYMPHOCYTES NFR BLD AUTO: 5.4 % (ref 27–41)
LYMPHOCYTES NFR BLD MANUAL: 3 % (ref 27–41)
MCH RBC QN AUTO: 30.8 PG (ref 27–31)
MCHC RBC AUTO-ENTMCNC: 34.3 G/DL (ref 32–36)
MCV RBC AUTO: 89.8 FL (ref 80–96)
MONOCYTES # BLD AUTO: 1.07 K/UL (ref 0–0.8)
MONOCYTES NFR BLD AUTO: 5.4 % (ref 2–6)
MONOCYTES NFR BLD MANUAL: 2 % (ref 2–6)
MPC BLD CALC-MCNC: 12.2 FL (ref 9.4–12.4)
NEUTROPHILS # BLD AUTO: 17.68 K/UL (ref 1.8–7.7)
NEUTROPHILS NFR BLD AUTO: 88.3 % (ref 53–65)
NEUTS BAND NFR BLD MANUAL: 3 % (ref 1–5)
NEUTS SEG NFR BLD MANUAL: 92 % (ref 50–62)
NRBC # BLD AUTO: 0 X10E3/UL
NRBC, AUTO (.00): 0 %
PLATELET # BLD AUTO: 186 K/UL (ref 150–400)
PLATELET MORPHOLOGY: NORMAL
POTASSIUM SERPL-SCNC: 4.9 MMOL/L (ref 3.5–5.1)
RBC # BLD AUTO: 2.95 M/UL (ref 4.6–6.2)
RH BLD: NORMAL
SARS-COV-2 RDRP RESP QL NAA+PROBE: NEGATIVE
SODIUM SERPL-SCNC: 135 MMOL/L (ref 136–145)
SPECIMEN OUTDATE: NORMAL
SPECIMEN OUTDATE: NORMAL
TROPONIN I SERPL DL<=0.01 NG/ML-MCNC: 1385.2 PG/ML
TROPONIN I SERPL DL<=0.01 NG/ML-MCNC: 229.7 PG/ML
UNIT NUMBER: NORMAL
UNIT NUMBER: NORMAL
WBC # BLD AUTO: 19.98 K/UL (ref 4.5–11)

## 2023-05-29 PROCEDURE — 36430 TRANSFUSION BLD/BLD COMPNT: CPT

## 2023-05-29 PROCEDURE — 87635 SARS-COV-2 COVID-19 AMP PRB: CPT

## 2023-05-29 PROCEDURE — 11000001 HC ACUTE MED/SURG PRIVATE ROOM

## 2023-05-29 PROCEDURE — 93010 EKG 12-LEAD: ICD-10-PCS | Mod: ,,, | Performed by: INTERNAL MEDICINE

## 2023-05-29 PROCEDURE — 99024 PR POST-OP FOLLOW-UP VISIT: ICD-10-PCS | Mod: ,,, | Performed by: STUDENT IN AN ORGANIZED HEALTH CARE EDUCATION/TRAINING PROGRAM

## 2023-05-29 PROCEDURE — 84484 ASSAY OF TROPONIN QUANT: CPT

## 2023-05-29 PROCEDURE — 25000003 PHARM REV CODE 250

## 2023-05-29 PROCEDURE — 99223 PR INITIAL HOSPITAL CARE,LEVL III: ICD-10-PCS | Mod: ,,, | Performed by: INTERNAL MEDICINE

## 2023-05-29 PROCEDURE — 96374 THER/PROPH/DIAG INJ IV PUSH: CPT

## 2023-05-29 PROCEDURE — 63600175 PHARM REV CODE 636 W HCPCS: Performed by: EMERGENCY MEDICINE

## 2023-05-29 PROCEDURE — 63600175 PHARM REV CODE 636 W HCPCS

## 2023-05-29 PROCEDURE — 99024 POSTOP FOLLOW-UP VISIT: CPT | Mod: ,,, | Performed by: STUDENT IN AN ORGANIZED HEALTH CARE EDUCATION/TRAINING PROGRAM

## 2023-05-29 PROCEDURE — 85025 COMPLETE CBC W/AUTO DIFF WBC: CPT

## 2023-05-29 PROCEDURE — 85014 HEMATOCRIT: CPT | Performed by: EMERGENCY MEDICINE

## 2023-05-29 PROCEDURE — 82962 GLUCOSE BLOOD TEST: CPT

## 2023-05-29 PROCEDURE — P9016 RBC LEUKOCYTES REDUCED: HCPCS | Performed by: EMERGENCY MEDICINE

## 2023-05-29 PROCEDURE — 93005 ELECTROCARDIOGRAM TRACING: CPT

## 2023-05-29 PROCEDURE — 80048 BASIC METABOLIC PNL TOTAL CA: CPT

## 2023-05-29 PROCEDURE — 84484 ASSAY OF TROPONIN QUANT: CPT | Performed by: EMERGENCY MEDICINE

## 2023-05-29 PROCEDURE — 99223 1ST HOSP IP/OBS HIGH 75: CPT | Mod: ,,, | Performed by: INTERNAL MEDICINE

## 2023-05-29 PROCEDURE — 93010 ELECTROCARDIOGRAM REPORT: CPT | Mod: ,,, | Performed by: INTERNAL MEDICINE

## 2023-05-29 RX ORDER — DEXTROSE 40 %
15 GEL (GRAM) ORAL
Status: DISCONTINUED | OUTPATIENT
Start: 2023-05-29 | End: 2023-06-06 | Stop reason: HOSPADM

## 2023-05-29 RX ORDER — SODIUM CHLORIDE 0.9 % (FLUSH) 0.9 %
10 SYRINGE (ML) INJECTION EVERY 12 HOURS PRN
Status: DISCONTINUED | OUTPATIENT
Start: 2023-05-29 | End: 2023-06-06 | Stop reason: HOSPADM

## 2023-05-29 RX ORDER — NIFEDIPINE 30 MG/1
30 TABLET, EXTENDED RELEASE ORAL DAILY
Status: DISCONTINUED | OUTPATIENT
Start: 2023-05-29 | End: 2023-05-29

## 2023-05-29 RX ORDER — INSULIN ASPART 100 [IU]/ML
0-5 INJECTION, SOLUTION INTRAVENOUS; SUBCUTANEOUS
Status: DISCONTINUED | OUTPATIENT
Start: 2023-05-29 | End: 2023-06-06 | Stop reason: HOSPADM

## 2023-05-29 RX ORDER — GLUCAGON 1 MG
1 KIT INJECTION
Status: DISCONTINUED | OUTPATIENT
Start: 2023-05-29 | End: 2023-06-06 | Stop reason: HOSPADM

## 2023-05-29 RX ORDER — ATORVASTATIN CALCIUM 80 MG/1
80 TABLET, FILM COATED ORAL NIGHTLY
Status: DISCONTINUED | OUTPATIENT
Start: 2023-05-29 | End: 2023-06-06 | Stop reason: HOSPADM

## 2023-05-29 RX ORDER — DEXTROSE 40 %
30 GEL (GRAM) ORAL
Status: DISCONTINUED | OUTPATIENT
Start: 2023-05-29 | End: 2023-06-06 | Stop reason: HOSPADM

## 2023-05-29 RX ORDER — NALOXONE HCL 0.4 MG/ML
0.02 VIAL (ML) INJECTION
Status: DISCONTINUED | OUTPATIENT
Start: 2023-05-29 | End: 2023-06-06 | Stop reason: HOSPADM

## 2023-05-29 RX ORDER — ONDANSETRON 2 MG/ML
4 INJECTION INTRAMUSCULAR; INTRAVENOUS
Status: COMPLETED | OUTPATIENT
Start: 2023-05-29 | End: 2023-05-29

## 2023-05-29 RX ADMIN — INSULIN ASPART 3 UNITS: 100 INJECTION, SOLUTION INTRAVENOUS; SUBCUTANEOUS at 06:05

## 2023-05-29 RX ADMIN — ONDANSETRON 4 MG: 2 INJECTION INTRAMUSCULAR; INTRAVENOUS at 01:05

## 2023-05-29 RX ADMIN — ATORVASTATIN CALCIUM 80 MG: 80 TABLET, FILM COATED ORAL at 07:05

## 2023-05-29 NOTE — H&P
[facility] - Emergency Department  Hospital Medicine  History & Physical    Patient Name: Pardeep Collins  MRN: [id number]  Patient Class: IP- Inpatient  Admission Date: 5/28/2023  Attending Physician: Haroon Henry MD   Primary Care Provider: Provider Notinsystem         Patient information was obtained from patient, EMS personnel, nursing home, past medical records and ER records.     Subjective:     Principal Problem:Hemorrhage of arteriovenous fistula    Chief Complaint:   Chief Complaint   Patient presents with    Weakness     Ems from  [facility] mdn - report called in and pt had loss large amts of blood from leaking dialysis cath - ems photo in chart -         HPI: Patient is an 82 year old male that was brought to [facility] via EMS for hemorrhage from AV fistula site. The patient has a PMHx of ESRD, HTN and CVA. The patient resides at [facility] and was reported to be bleeding from AV fistula site in right arm. It was reported that the patient had large amount of blood loss from the hemorrhage (photos in media). A pressure dressing was placed and the bleeding stopped. The patient has difficulty communicating verbally due to previous CVA but does nod in response to questions. The patient appears to deny any pain and does not appear in distress.    In ED, patient was noted to have /89 and this dropped to 70s systolic. In the ED the patient received 2L lactated ringers and 2 units pRBCs with initial hemoglobin of 6.5, improving to 8.8. The patient reported no chest pain, however troponin level was checked x2 with 31 and an increase to 229 seen.      Per chart review, the patient sees Dr. Aceves as his Nephrologist and had his AV fistula (Right arm) placed by Dr. Sierra on 3/20/23.  The patient has dialysis scheduled Tuesday, Thursday and Saturday. Last dialysis was Saturday 5/27/2023.     The patient will be admitted to [facility] for continued care and medical management.        Past Medical History:   Diagnosis Date    CVA (cerebral vascular accident)     Dialysis patient     Elevated PSA     Gout, unspecified     Hypertension     Renal disorder     Rheumatoid arthritis, unspecified        Past Surgical History:   Procedure Laterality Date    AV FISTULA PLACEMENT Right 3/20/2023    Procedure: CREATION, AV FISTULA;  Surgeon: Alfred Sierra MD;  Location: [facility];  Service: General;  Laterality: Right;  right basilic vein transposition    HAND SURGERY Left     urolift      in [place];       Review of patient's allergies indicates:  No Known Allergies    No current facility-administered medications on file prior to encounter.     Current Outpatient Medications on File Prior to Encounter   Medication Sig    aspirin (ECOTRIN) 81 MG EC tablet Take 1 tablet (81 mg total) by mouth once daily.    atorvastatin (LIPITOR) 80 MG tablet Take 1 tablet (80 mg total) by mouth every evening.    desmopressin (DDAVP) 0.2 MG tablet Take 1 tablet (200 mcg total) by mouth nightly.    furosemide (LASIX) 20 MG tablet Take 1 tablet (20 mg total) by mouth 2 (two) times daily.    HYDROcodone-acetaminophen (NORCO) 7.5-325 mg per tablet Take 1 tablet by mouth every 6 (six) hours as needed for Pain.    NIFEdipine (ADALAT CC) 30 MG TbSR Take 30 mg by mouth once daily.    polyethylene glycol (GLYCOLAX) 17 gram PwPk Take 17 g by mouth 2 (two) times daily as needed.     Family History       Problem Relation (Age of Onset)    Breast cancer Sister    Heart disease Father          Tobacco Use    Smoking status: Former     Types: Cigarettes     Quit date:      Years since quittin.4    Smokeless tobacco: Never   Substance and Sexual Activity    Alcohol use: Not Currently     Comment: quit in     Drug use: Never    Sexual activity: Not Currently     Review of Systems   Unable to perform ROS: Patient nonverbal   Objective:     Vital Signs (Most Recent):  Temp: 96.5 °F (35.8 °C)  (05/28/23 2115)  Pulse: 98 (05/29/23 0145)  Resp: 19 (05/29/23 0145)  BP: 98/61 (05/29/23 0145)  SpO2: 100 % (05/29/23 0145) Vital Signs (24h Range):  Temp:  [96.5 °F (35.8 °C)-96.6 °F (35.9 °C)] 96.5 °F (35.8 °C)  Pulse:  [] 98  Resp:  [13-34] 19  SpO2:  [84 %-100 %] 100 %  BP: ()/(47-89) 98/61     Weight: 55.3 kg (122 lb)  Body mass index is 18.55 kg/m².     Physical Exam  Vitals reviewed.   Constitutional:       General: He is awake. He is not in acute distress.     Appearance: Normal appearance. He is well-developed, well-groomed and normal weight. He is not ill-appearing.   HENT:      Head: Normocephalic and atraumatic.      Mouth/Throat:      Mouth: Mucous membranes are moist.      Dentition: Abnormal dentition. Has dentures.      Pharynx: Oropharynx is clear.   Eyes:      Conjunctiva/sclera: Conjunctivae normal.      Right eye: No hemorrhage.     Left eye: No hemorrhage.  Cardiovascular:      Rate and Rhythm: Regular rhythm. Tachycardia present.      Pulses: Normal pulses.      Heart sounds: Normal heart sounds.   Pulmonary:      Effort: Pulmonary effort is normal.      Breath sounds: Normal breath sounds.   Abdominal:      General: Bowel sounds are normal.      Palpations: Abdomen is soft.   Musculoskeletal:      Right lower leg: No edema.      Left lower leg: No edema.   Neurological:      Mental Status: Mental status is at baseline. He is lethargic.   Psychiatric:         Mood and Affect: Mood normal.         Behavior: Behavior is cooperative.              Significant Labs: All pertinent labs within the past 24 hours have been reviewed.    Significant Imaging: I have reviewed all pertinent imaging results/findings within the past 24 hours.    Assessment/Plan:     * Hemorrhage of arteriovenous fistula  - R arm bleeding while at nursing home 5/28/2023  - Bleeding stopped in ED with pressure dressing  - Successful dialysis last reported on 5/27/2023  - Holding home aspirin  - General surgery  consulted, appreciate assistance  - AV fistula (Right arm) placed by Dr. Sierra on 3/20/23.  - Initial H/H 6.5/21, after 2 units of pRBCs H/H 8.8/25.9    Type 2 MI (myocardial infarction)  - Likely 2/2 to acute blood loss anemia  - Troponin x3 - 31, 229, pending  - EKG - sinus tachycardia    ESRD (end stage renal disease)  - Patient reportedly followed by Dr. Aceves  - Dialysis schedule Tuesday, Thursday, Saturday  - Nephrology consulted, appreciate assistance  - Last dialysis Saturday 5/27/2023      Anemia  - Initial hemoglobin in ED 6.5, after 2 units pRBCs hemoglobin 8.8  - Will continue to monitor      Primary hypertension  - Patient hypotensive on admission  - Holding home medication        VTE Risk Mitigation (From admission, onward)         Ordered     Reason for No Pharmacological VTE Prophylaxis  Once        Question:  Reasons:  Answer:  Active Bleeding    05/29/23 0154     IP VTE HIGH RISK PATIENT  Once         05/29/23 0154     Place sequential compression device  Until discontinued         05/29/23 0154                           Abdelrahman Merino MD  Department of Hospital Medicine  [facility] - Emergency Department

## 2023-05-29 NOTE — SUBJECTIVE & OBJECTIVE
Past Medical History:   Diagnosis Date    CVA (cerebral vascular accident)     Dialysis patient     Elevated PSA     Gout, unspecified     Hypertension     Renal disorder     Rheumatoid arthritis, unspecified        Past Surgical History:   Procedure Laterality Date    AV FISTULA PLACEMENT Right 3/20/2023    Procedure: CREATION, AV FISTULA;  Surgeon: Alfred Sierra MD;  Location: [facility];  Service: General;  Laterality: Right;  right basilic vein transposition    HAND SURGERY Left     urolift      in [place];       Review of patient's allergies indicates:  No Known Allergies    No current facility-administered medications on file prior to encounter.     Current Outpatient Medications on File Prior to Encounter   Medication Sig    aspirin (ECOTRIN) 81 MG EC tablet Take 1 tablet (81 mg total) by mouth once daily.    atorvastatin (LIPITOR) 80 MG tablet Take 1 tablet (80 mg total) by mouth every evening.    desmopressin (DDAVP) 0.2 MG tablet Take 1 tablet (200 mcg total) by mouth nightly.    furosemide (LASIX) 20 MG tablet Take 1 tablet (20 mg total) by mouth 2 (two) times daily.    HYDROcodone-acetaminophen (NORCO) 7.5-325 mg per tablet Take 1 tablet by mouth every 6 (six) hours as needed for Pain.    NIFEdipine (ADALAT CC) 30 MG TbSR Take 30 mg by mouth once daily.    polyethylene glycol (GLYCOLAX) 17 gram PwPk Take 17 g by mouth 2 (two) times daily as needed.     Family History       Problem Relation (Age of Onset)    Breast cancer Sister    Heart disease Father          Tobacco Use    Smoking status: Former     Types: Cigarettes     Quit date:      Years since quittin.4    Smokeless tobacco: Never   Substance and Sexual Activity    Alcohol use: Not Currently     Comment: quit in     Drug use: Never    Sexual activity: Not Currently     Review of Systems   Unable to perform ROS: Patient nonverbal   Objective:     Vital Signs (Most Recent):  Temp: 96.5 °F (35.8 °C) (23)  Pulse: 98  (05/29/23 0145)  Resp: 19 (05/29/23 0145)  BP: 98/61 (05/29/23 0145)  SpO2: 100 % (05/29/23 0145) Vital Signs (24h Range):  Temp:  [96.5 °F (35.8 °C)-96.6 °F (35.9 °C)] 96.5 °F (35.8 °C)  Pulse:  [] 98  Resp:  [13-34] 19  SpO2:  [84 %-100 %] 100 %  BP: ()/(47-89) 98/61     Weight: 55.3 kg (122 lb)  Body mass index is 18.55 kg/m².     Physical Exam  Vitals reviewed.   Constitutional:       General: He is awake. He is not in acute distress.     Appearance: Normal appearance. He is well-developed, well-groomed and normal weight. He is not ill-appearing.   HENT:      Head: Normocephalic and atraumatic.      Mouth/Throat:      Mouth: Mucous membranes are moist.      Dentition: Abnormal dentition. Has dentures.      Pharynx: Oropharynx is clear.   Eyes:      Conjunctiva/sclera: Conjunctivae normal.      Right eye: No hemorrhage.     Left eye: No hemorrhage.  Cardiovascular:      Rate and Rhythm: Regular rhythm. Tachycardia present.      Pulses: Normal pulses.      Heart sounds: Normal heart sounds.   Pulmonary:      Effort: Pulmonary effort is normal.      Breath sounds: Normal breath sounds.   Abdominal:      General: Bowel sounds are normal.      Palpations: Abdomen is soft.   Musculoskeletal:      Right lower leg: No edema.      Left lower leg: No edema.   Neurological:      Mental Status: Mental status is at baseline. He is lethargic.   Psychiatric:         Mood and Affect: Mood normal.         Behavior: Behavior is cooperative.              Significant Labs: All pertinent labs within the past 24 hours have been reviewed.    Significant Imaging: I have reviewed all pertinent imaging results/findings within the past 24 hours.

## 2023-05-29 NOTE — CONSULTS
[facility] - [address] [place] [address]  General Surgery  Consult Note    Patient Name: Pardeep Collins  MRN: [id number]  Code Status: Full Code  Admission Date: 5/28/2023  Hospital Length of Stay: 0 days  Attending Physician: Rodrigo Alba MD  Primary Care Provider: Provider Juan    Patient information was obtained from past medical records and ER records.     Inpatient consult to General Surgery  Consult performed by: Da Moss DO  Consult ordered by: Abdelrahman Merino MD        Subjective:     Principal Problem: Hemorrhage of arteriovenous fistula    History of Present Illness: 82-year-old  male presented to [facility] for bleeding from previous AV fistula site.  Patient had a right upper extremity AV fistula created in March by Dr. Sierra.  This was recently accessed and he has been having bleeding at this site; a pressure dressing was applied, but the area was still having oozing.  Patient is nonverbal for CVA and also can not use the right upper extremity.  Patient was found to have a hemoglobin of 6.5 and given 2 units of blood and responded appropriately.  Bleeding did get controlled the ER with pressure dressing.  Patient was found to have elevated troponins and was admitted to the hospital for medical management.  General surgery consulted to evaluate fistula and bleeding.  Unable to obtain review of systems due to patient being nonverbal.      No current facility-administered medications on file prior to encounter.     Current Outpatient Medications on File Prior to Encounter   Medication Sig    aspirin (ECOTRIN) 81 MG EC tablet Take 1 tablet (81 mg total) by mouth once daily.    atorvastatin (LIPITOR) 80 MG tablet Take 1 tablet (80 mg total) by mouth every evening.    desmopressin (DDAVP) 0.2 MG tablet Take 1 tablet (200 mcg total) by mouth nightly.    furosemide (LASIX) 20 MG tablet Take 1 tablet (20 mg total) by mouth 2 (two) times daily.    HYDROcodone-acetaminophen (NORCO)  7.5-325 mg per tablet Take 1 tablet by mouth every 6 (six) hours as needed for Pain.    NIFEdipine (ADALAT CC) 30 MG TbSR Take 30 mg by mouth once daily.    polyethylene glycol (GLYCOLAX) 17 gram PwPk Take 17 g by mouth 2 (two) times daily as needed.       Review of patient's allergies indicates:  No Known Allergies    Past Medical History:   Diagnosis Date    CVA (cerebral vascular accident)     Dialysis patient     Elevated PSA     Gout, unspecified     Hypertension     Renal disorder     Rheumatoid arthritis, unspecified      Past Surgical History:   Procedure Laterality Date    AV FISTULA PLACEMENT Right 3/20/2023    Procedure: CREATION, AV FISTULA;  Surgeon: Alfred Sierra MD;  Location: [facility];  Service: General;  Laterality: Right;  right basilic vein transposition    HAND SURGERY Left     urolift      in [place];     Family History       Problem Relation (Age of Onset)    Breast cancer Sister    Heart disease Father          Tobacco Use    Smoking status: Former     Types: Cigarettes     Quit date:      Years since quittin.4    Smokeless tobacco: Never   Substance and Sexual Activity    Alcohol use: Not Currently     Comment: quit in     Drug use: Never    Sexual activity: Not Currently     Review of Systems   Unable to perform ROS: Patient nonverbal   Objective:     Vital Signs (Most Recent):  Temp: 97.5 °F (36.4 °C) (23)  Pulse: 100 (23)  Resp: 20 (23)  BP: 136/80 (23)  SpO2: 100 % (23) Vital Signs (24h Range):  Temp:  [96.2 °F (35.7 °C)-97.5 °F (36.4 °C)] 97.5 °F (36.4 °C)  Pulse:  [] 100  Resp:  [13-34] 20  SpO2:  [84 %-100 %] 100 %  BP: ()/(47-89) 136/80     Weight: 55.3 kg (122 lb)  Body mass index is 18.55 kg/m².     Physical Exam  Constitutional:       General: He is not in acute distress.     Appearance: Normal appearance.   HENT:      Head: Normocephalic.   Cardiovascular:      Rate and Rhythm:  Normal rate.   Pulmonary:      Effort: Pulmonary effort is normal. No respiratory distress.   Abdominal:      General: There is no distension.      Tenderness: There is no abdominal tenderness.   Musculoskeletal:         General: Normal range of motion.      Comments: 1 cm hole in the right medial arm at the AV fistula with blood clots; blood clot easily evacuated and no active bleeding; dressing applied.  Questionable thrill upon palpation of fistula   Skin:     General: Skin is warm.      Coloration: Skin is not jaundiced.   Neurological:      Mental Status: He is alert. Mental status is at baseline.      Cranial Nerves: No cranial nerve deficit.      Motor: Weakness present.      Comments: Right-sided weakness          I have reviewed all pertinent lab results within the past 24 hours.  CBC:   Recent Labs   Lab 05/29/23 0405   WBC 19.98*   RBC 2.95*   HGB 9.1*   HCT 26.5*      MCV 89.8   MCH 30.8   MCHC 34.3     BMP:   Recent Labs   Lab 05/29/23 0405   *   *   K 4.9   CL 98   CO2 27   BUN 55*   CREATININE 6.61*   CALCIUM 8.7       Significant Diagnostics:  I have reviewed all pertinent imaging results/findings within the past 24 hours.      Assessment/Plan:     * Hemorrhage of arteriovenous fistula  No current active hemorrhage; dressing applied.      Elevate the arm with Ace wrap.  We will take dressing down evaluate tomorrow and get vascular Doppler to evaluate fistula.    If fistula is clotted, patient will need tunneled dialysis catheter and possible declot or later staged revision.  Follow-up nephrology recommendations on timely need for dialysis.      VTE Risk Mitigation (From admission, onward)         Ordered     Reason for No Pharmacological VTE Prophylaxis  Once        Question:  Reasons:  Answer:  Active Bleeding    05/29/23 0154     IP VTE HIGH RISK PATIENT  Once         05/29/23 0154     Place sequential compression device  Until discontinued         05/29/23 0154                 Thank you for your consult. I will follow-up with patient. Please contact us if you have any additional questions.    Da Flores, DO  General Surgery  [facility] - [facility]

## 2023-05-29 NOTE — HPI
82-year-old  male presented to [facility] for bleeding from previous AV fistula site.  Patient had a right upper extremity AV fistula created in March by Dr. Sierra.  This was recently accessed and he has been having bleeding at this site; a pressure dressing was applied, but the area was still having oozing.  Patient is nonverbal for CVA and also can not use the right upper extremity.  Patient was found to have a hemoglobin of 6.5 and given 2 units of blood and responded appropriately.  Bleeding did get controlled the ER with pressure dressing.  Patient was found to have elevated troponins and was admitted to the hospital for medical management.  General surgery consulted to evaluate fistula and bleeding.  Unable to obtain review of systems due to patient being nonverbal.

## 2023-05-29 NOTE — ASSESSMENT & PLAN NOTE
Patient has had some bleeding from his AV access after dialysis.  I have asked the dialysis personnel do plan on dialyzing the patient tomorrow however if further evaluation or treatment needs to be administered with regards to his AV access dysfunction his dialysis could be put off until the following day if not a little longer if necessary.

## 2023-05-29 NOTE — PLAN OF CARE
[facility]  Initial Discharge Assessment       Primary Care Provider: Provider Notinsystem    Admission Diagnosis: Bleeding [R58]  Elevated troponin [R77.8]  Chest pain [R07.9]  Complication of arteriovenous dialysis fistula, initial encounter [T82.9XXA]  Hypotension, unspecified hypotension type [I95.9]    Admission Date: 5/28/2023  Expected Discharge Date:     Transition of Care Barriers: None    Payor: [facility] MANAGED MCARE / Plan: [facility] MEDICARE COMPLETE / Product Type: Medicare Advantage /     Extended Emergency Contact Information  Primary Emergency Contact: leila rubi  Mobile Phone: [phone]  Relation: Sister  Preferred language: English   needed? No  Secondary Emergency Contact: Jose Cedeño  Mobile Phone: [phone]  Relation: Relative  Preferred language: English   needed? No    Discharge Plan A: Return to nursing home  Discharge Plan B: Return to Nursing Home      [facility] - [facility] [address]  [address]  [address]  [facility] [address]  Phone: [phone] Fax: [phone]    [facility] [address] [place] [address] - [address]  [address] [facility] [address]  Phone: [phone] Fax: [phone]    [facility] [address] [place] [address]  Phone: [phone] Fax: [phone]      Initial Assessment (most recent)       Adult Discharge Assessment - 05/29/23 1100          Discharge Assessment    Assessment Type Discharge Planning Assessment     Confirmed/corrected address, phone number and insurance Yes     Source of Information family     Communicated RASHAD with patient/caregiver Date not available/Unable to determine     People in Home sibling(s)     Do you expect to return to your current living situation? Yes     Do you have help at home or someone to help you manage your care at home? Yes     Prior to  hospitilization cognitive status: Unable to Assess     Equipment Currently Used at Home cane, straight;walker, rolling     Patient currently being followed by outpatient case management? No     Do you currently have service(s) that help you manage your care at home? Yes     Do you take prescription medications? Yes     Do you have prescription coverage? Yes     Coverage [facility] medicare     How do you get to doctors appointments? public transportation     Are you on dialysis? Yes     Discharge Plan A Return to nursing home     Discharge Plan B Return to Nursing Home     DME Needed Upon Discharge  none     Discharge Plan discussed with: Sibling     Transition of Care Barriers None                        Ss spoke with pt's sister leila and pt is a resident at [facility] and will return at d/c. Packet made. Initial info faxed to [facility].  following for d/c needs.    1130am Ss spoke with [facility] and they will need to resubmit for ins approval before pt returns to [facility]. Ss to fax referral closer to d/c. Following.

## 2023-05-29 NOTE — ASSESSMENT & PLAN NOTE
- R arm bleeding while at nursing home 5/28/2023  - Bleeding stopped in ED with pressure dressing  - Successful dialysis last reported on 5/27/2023  - Holding home aspirin  - General surgery consulted, appreciate assistance  - AV fistula (Right arm) placed by Dr. Sierra on 3/20/23.  - Initial H/H 6.5/21, after 2 units of pRBCs H/H 8.8/25.9

## 2023-05-29 NOTE — SUBJECTIVE & OBJECTIVE
No current facility-administered medications on file prior to encounter.     Current Outpatient Medications on File Prior to Encounter   Medication Sig    aspirin (ECOTRIN) 81 MG EC tablet Take 1 tablet (81 mg total) by mouth once daily.    atorvastatin (LIPITOR) 80 MG tablet Take 1 tablet (80 mg total) by mouth every evening.    desmopressin (DDAVP) 0.2 MG tablet Take 1 tablet (200 mcg total) by mouth nightly.    furosemide (LASIX) 20 MG tablet Take 1 tablet (20 mg total) by mouth 2 (two) times daily.    HYDROcodone-acetaminophen (NORCO) 7.5-325 mg per tablet Take 1 tablet by mouth every 6 (six) hours as needed for Pain.    NIFEdipine (ADALAT CC) 30 MG TbSR Take 30 mg by mouth once daily.    polyethylene glycol (GLYCOLAX) 17 gram PwPk Take 17 g by mouth 2 (two) times daily as needed.       Review of patient's allergies indicates:  No Known Allergies    Past Medical History:   Diagnosis Date    CVA (cerebral vascular accident)     Dialysis patient     Elevated PSA     Gout, unspecified     Hypertension     Renal disorder     Rheumatoid arthritis, unspecified      Past Surgical History:   Procedure Laterality Date    AV FISTULA PLACEMENT Right 3/20/2023    Procedure: CREATION, AV FISTULA;  Surgeon: Alfred Sierra MD;  Location: [facility], [place];  Service: General;  Laterality: Right;  right basilic vein transposition    HAND SURGERY Left     urolift      in [place];     Family History       Problem Relation (Age of Onset)    Breast cancer Sister    Heart disease Father          Tobacco Use    Smoking status: Former     Types: Cigarettes     Quit date:      Years since quittin.4    Smokeless tobacco: Never   Substance and Sexual Activity    Alcohol use: Not Currently     Comment: quit in     Drug use: Never    Sexual activity: Not Currently     Review of Systems   Unable to perform ROS: Patient nonverbal   Objective:     Vital Signs (Most Recent):  Temp: 97.5 °F (36.4 °C) (23 0743)  Pulse: 100  (05/29/23 0743)  Resp: 20 (05/29/23 0743)  BP: 136/80 (05/29/23 0743)  SpO2: 100 % (05/29/23 0743) Vital Signs (24h Range):  Temp:  [96.2 °F (35.7 °C)-97.5 °F (36.4 °C)] 97.5 °F (36.4 °C)  Pulse:  [] 100  Resp:  [13-34] 20  SpO2:  [84 %-100 %] 100 %  BP: ()/(47-89) 136/80     Weight: 55.3 kg (122 lb)  Body mass index is 18.55 kg/m².     Physical Exam  Constitutional:       General: He is not in acute distress.     Appearance: Normal appearance.   HENT:      Head: Normocephalic.   Cardiovascular:      Rate and Rhythm: Normal rate.   Pulmonary:      Effort: Pulmonary effort is normal. No respiratory distress.   Abdominal:      General: There is no distension.      Tenderness: There is no abdominal tenderness.   Musculoskeletal:         General: Normal range of motion.      Comments: 1 cm hole in the right medial arm at the AV fistula with blood clots; blood clot easily evacuated and no active bleeding; dressing applied.  Questionable thrill upon palpation of fistula   Skin:     General: Skin is warm.      Coloration: Skin is not jaundiced.   Neurological:      Mental Status: He is alert. Mental status is at baseline.      Cranial Nerves: No cranial nerve deficit.      Motor: Weakness present.      Comments: Right-sided weakness          I have reviewed all pertinent lab results within the past 24 hours.  CBC:   Recent Labs   Lab 05/29/23  0405   WBC 19.98*   RBC 2.95*   HGB 9.1*   HCT 26.5*      MCV 89.8   MCH 30.8   MCHC 34.3     BMP:   Recent Labs   Lab 05/29/23  0405   *   *   K 4.9   CL 98   CO2 27   BUN 55*   CREATININE 6.61*   CALCIUM 8.7       Significant Diagnostics:  I have reviewed all pertinent imaging results/findings within the past 24 hours.

## 2023-05-29 NOTE — PROGRESS NOTES
D/w oncall gen surg and cardiologist, for now important step is to get bleeding under control followed by ischemic eval.

## 2023-05-29 NOTE — CONSULTS
[facility] - [address] [place] [address]  Nephrology  Consult Note    Patient Name: Pardeep Collins  MRN: [id number]  Admission Date: 5/28/2023  Hospital Length of Stay: 0 days  Attending Provider: Rodrigo Alba MD   Primary Care Physician: Provider [employer]  Principal Problem:Hemorrhage of arteriovenous fistula    Consults  Subjective:     HPI: Chief complaint ESRD in a patient admitted for bleeding from his AV access    History of present illness:  Mr. Collins is an 82-year-old  gentleman with ESRD who dialyzes on a Tuesday Thursday Saturday basis.  The patient resides at a nursing home.  The patient was sent to the hospital due to bleeding from his AV access post dialysis.  The patient was transfused in the hospital on admission.  The patient's hematocrit is increased to 26%, his initial hematocrit at presentation was 21%.  The patient had his AV access placed about 2 months ago.  The patient's last dialysis was this past Saturday.  The patient's bleeding stopped from his AV access with a pressure dressing.  The patient is to have his access dressing removed tomorrow for further evaluation at that time by surgery.  The history is taken from the medical chart as the patient is unable to contribute to the history due to his medical condition (history of stroke with a nonverbal state).      Past Medical History:   Diagnosis Date    CVA (cerebral vascular accident)     Dialysis patient     Elevated PSA     Gout, unspecified     Hypertension     Renal disorder     Rheumatoid arthritis, unspecified        Past Surgical History:   Procedure Laterality Date    AV FISTULA PLACEMENT Right 3/20/2023    Procedure: CREATION, AV FISTULA;  Surgeon: Alfred Sierra MD;  Location: [facility];  Service: General;  Laterality: Right;  right basilic vein transposition    HAND SURGERY Left     urolift      in [place];       Review of patient's allergies indicates:  No Known Allergies  Current  Facility-Administered Medications   Medication Frequency    0.9%  NaCl infusion (for blood administration) Q24H PRN    atorvastatin tablet 80 mg QHS    dextrose 10% bolus 125 mL 125 mL PRN    dextrose 10% bolus 250 mL 250 mL PRN    dextrose 40 % gel 15,000 mg PRN    dextrose 40 % gel 30,000 mg PRN    glucagon (human recombinant) injection 1 mg PRN    insulin aspart U-100 injection 0-5 Units QID (AC + HS) PRN    naloxone 0.4 mg/mL injection 0.02 mg PRN    sodium chloride 0.9% flush 10 mL Q12H PRN     Family History       Problem Relation (Age of Onset)    Breast cancer Sister    Heart disease Father          Tobacco Use    Smoking status: Former     Types: Cigarettes     Quit date:      Years since quittin.4    Smokeless tobacco: Never   Substance and Sexual Activity    Alcohol use: Not Currently     Comment: quit in     Drug use: Never    Sexual activity: Not Currently     Review of Systems   Unable to perform ROS: Patient nonverbal   Objective:     Vital Signs (Most Recent):  Temp: 97.5 °F (36.4 °C) (23 07)  Pulse: 100 (23)  Resp: 20 (23)  BP: 136/80 (23)  SpO2: 100 % (23) Vital Signs (24h Range):  Temp:  [96.2 °F (35.7 °C)-97.5 °F (36.4 °C)] 97.5 °F (36.4 °C)  Pulse:  [] 100  Resp:  [13-34] 20  SpO2:  [84 %-100 %] 100 %  BP: ()/(47-89) 136/80     Weight: 55.3 kg (122 lb) (232)  Body mass index is 18.55 kg/m².  Body surface area is 1.63 meters squared.    No intake/output data recorded.     Physical Exam  Vitals reviewed.   Constitutional:       General: He is not in acute distress.     Appearance: He is not toxic-appearing.   HENT:      Head: Normocephalic and atraumatic.      Nose: Nose normal.      Mouth/Throat:      Mouth: Mucous membranes are moist.      Pharynx: Oropharynx is clear.   Eyes:      General: No scleral icterus.     Conjunctiva/sclera: Conjunctivae normal.      Pupils: Pupils are equal, round, and  reactive to light.   Cardiovascular:      Rate and Rhythm: Normal rate and regular rhythm.   Pulmonary:      Effort: No respiratory distress.      Breath sounds: Normal breath sounds.   Abdominal:      General: Bowel sounds are normal.      Palpations: Abdomen is soft. There is no mass.   Musculoskeletal:         General: No swelling or tenderness.      Cervical back: Normal range of motion and neck supple.   Lymphadenopathy:      Cervical: No cervical adenopathy.   Skin:     General: Skin is warm and dry.      Findings: No erythema.   Neurological:      Mental Status: Mental status is at baseline.      Comments: Patient was nonverbal, he did follow commands, he has some weakness involving his right side.   Psychiatric:         Mood and Affect: Mood normal.         Behavior: Behavior normal.        Significant Labs:  All labs within the past 24 hours have been reviewed.    Significant Imaging:      Assessment/Plan:     Cardiac/Vascular  * Hemorrhage of arteriovenous fistula  Patient has had some bleeding from his AV access after dialysis.  I have asked the dialysis personnel do plan on dialyzing the patient tomorrow however if further evaluation or treatment needs to be administered with regards to his AV access dysfunction his dialysis could be put off until the following day if not a little longer if necessary.    Primary hypertension  Patient's blood pressure is controlled off of antihypertensives presently.    Renal/  ESRD (end stage renal disease)  See hemorrhage of arterial venous fistula above for plan    Oncology  Anemia  Patient's hematocrit is 26% increased from 21%, I am going to start him on EPO        Thank you for your consult. I will follow-up with patient. Please contact us if you have any additional questions.    Dk Walton MD  Nephrology  [facility] - [address] [place] [address]

## 2023-05-29 NOTE — HPI
Chief complaint ESRD in a patient admitted for bleeding from his AV access    History of present illness:  Mr. Collins is an 82-year-old  gentleman with ESRD who dialyzes on a Tuesday Thursday Saturday basis.  The patient resides at a nursing home.  The patient was sent to the hospital due to bleeding from his AV access post dialysis.  The patient was transfused in the hospital on admission.  The patient's hematocrit is increased to 26%, his initial hematocrit at presentation was 21%.  The patient had his AV access placed about 2 months ago.  The patient's last dialysis was this past Saturday.  The patient's bleeding stopped from his AV access with a pressure dressing.  The patient is to have his access dressing removed tomorrow for further evaluation at that time by surgery.  The history is taken from the medical chart as the patient is unable to contribute to the history due to his medical condition (history of stroke with a nonverbal state).

## 2023-05-29 NOTE — HPI
Patient is an 82 year old male that was brought to [facility] via EMS for hemorrhage from AV fistula site. The patient has a PMHx of ESRD, HTN and CVA. The patient resides at [facility] and was reported to be bleeding from AV fistula site in right arm. It was reported that the patient had large amount of blood loss from the hemorrhage (photos in media). A pressure dressing was placed and the bleeding stopped. The patient has difficulty communicating verbally due to previous CVA but does nod in response to questions. The patient appears to deny any pain and does not appear in distress.    In ED, patient was noted to have /89 and this dropped to 70s systolic. In the ED the patient received 2L lactated ringers and 2 units pRBCs with initial hemoglobin of 6.5, improving to 8.8. The patient reported no chest pain, however troponin level was checked x2 with 31 and an increase to 229 seen.      Per chart review, the patient sees Dr. Aceves as his Nephrologist and had his AV fistula (Right arm) placed by Dr. Sierra on 3/20/23.  The patient has dialysis scheduled Tuesday, Thursday and Saturday. Last dialysis was Saturday 5/27/2023.     The patient will be admitted to [facility] for continued care and medical management.

## 2023-05-29 NOTE — ASSESSMENT & PLAN NOTE
- Patient reportedly followed by Dr. Aceves  - Dialysis schedule Tuesday, Thursday, Saturday  - Nephrology consulted, appreciate assistance  - Last dialysis Saturday 5/27/2023

## 2023-05-29 NOTE — ED PROVIDER NOTES
Encounter Date: 2023       History     Chief Complaint   Patient presents with    Weakness     Ems from  [facility] mdn - report called in and pt had loss large amts of blood from leaking dialysis cath - ems photo in chart -      A 81 yo male with a pmh of ESRD on HD (TTS), HTN was brought by EMS from [facility] d/t bleeding from AV dialysis graft site in right arm. Pt was noted to have large amount of leakage of blood clots and bleeding from right arm AV dialysis graft site ( please see media pic) in nursing home. In ED, pt was noted to have /89 and dropped to 70s systolic. Blood transfusion with IV fluid bolus started and BP improved to 115/64. Pt became more alert.     Per chart review, pt had right AV fistula (Right arm) on 3/20/23 with right arm basilic vein transposition. On 23, it was noted that there was difficulty cannulating AV graft access so dialysis was not done. U/S was done at the time and showed AV dialysis graft patent w/ no active bleeding, No pseudoaneurysm. Pt had his last dialysis yesterday.         Review of patient's allergies indicates:  No Known Allergies  Past Medical History:   Diagnosis Date    CVA (cerebral vascular accident)     Dialysis patient     Elevated PSA     Gout, unspecified     Hypertension     Renal disorder     Rheumatoid arthritis, unspecified      Past Surgical History:   Procedure Laterality Date    AV FISTULA PLACEMENT Right 3/20/2023    Procedure: CREATION, AV FISTULA;  Surgeon: Alfred Sierra MD;  Location: [facility] [place];  Service: General;  Laterality: Right;  right basilic vein transposition    HAND SURGERY Left     urolift      in [place];     Family History   Problem Relation Age of Onset    Heart disease Father     Breast cancer Sister      Social History     Tobacco Use    Smoking status: Former     Types: Cigarettes     Quit date:      Years since quittin.4    Smokeless tobacco: Never   Substance Use Topics    Alcohol use:  Not Currently     Comment: quit in 1980    Drug use: Never     Review of Systems   Unable to perform ROS: Acuity of condition     Physical Exam     Initial Vitals [05/28/23 2100]   BP Pulse Resp Temp SpO2   (!) 75/51 109 13 96.6 °F (35.9 °C) (!) 92 %      MAP       --         Physical Exam    HENT:   Head: Normocephalic and atraumatic.   Eyes: Conjunctivae are normal.   Cardiovascular:  Regular rhythm.   Tachycardia present.         Pulmonary/Chest: Breath sounds normal. He has no wheezes. He has no rhonchi. He has no rales.   Abdominal: Abdomen is soft. Bowel sounds are normal.     Neurological: He is alert.   Skin:   Oozing from right AV fistula site and covered with dressing. Please see Media pic of blood clots taken from nursing home        Medical Screening Exam   See Full Note    ED Course   Procedures  Labs Reviewed   COMPREHENSIVE METABOLIC PANEL - Abnormal; Notable for the following components:       Result Value    CO2 19 (*)     Anion Gap 21 (*)     Glucose 285 (*)     BUN 48 (*)     Creatinine 6.56 (*)     Calcium 8.4 (*)     Total Protein 4.7 (*)     Albumin 1.6 (*)     eGFR 8 (*)     All other components within normal limits   URINALYSIS, REFLEX TO URINE CULTURE - Abnormal; Notable for the following components:    Protein,  (*)     Glucose, UA 70 (*)     All other components within normal limits   CBC WITH DIFFERENTIAL - Abnormal; Notable for the following components:    RBC 2.08 (*)     Hemoglobin 6.5 (*)     Hematocrit 21.0 (*)     .0 (*)     MCH 31.3 (*)     MCHC 31.0 (*)     MPV 12.5 (*)     Neutrophils % 51.6 (*)     All other components within normal limits   PROTIME-INR - Abnormal; Notable for the following components:    PT 16.7 (*)     All other components within normal limits   TROPONIN I - Abnormal; Notable for the following components:    Troponin I High Sensitivity 229.7 (*)     All other components within normal limits   HEMOGLOBIN AND HEMATOCRIT, BLOOD - Abnormal; Notable  for the following components:    Hemoglobin 8.8 (*)     Hematocrit 25.9 (*)     All other components within normal limits   APTT - Normal   TROPONIN I - Normal   URINALYSIS, MICROSCOPIC - Normal   CBC W/ AUTO DIFFERENTIAL    Narrative:     The following orders were created for panel order CBC auto differential.  Procedure                               Abnormality         Status                     ---------                               -----------         ------                     CBC with Differential[[id number]]        Abnormal            Final result                 Please view results for these tests on the individual orders.   GREY TOP HOLD   EXTRA TUBES    Narrative:     The following orders were created for panel order EXTRA TUBES.  Procedure                               Abnormality         Status                     ---------                               -----------         ------                     Red Top Hold[[id number]]                                     In process                 Lavender Top Hold[[id number]]                                In process                 Gold Top Hold[[id number]]                                    In process                 Pink Top Hold[[id number]]                                    In process                 Diaz Top Hold[[id number]]                                    Final result                 Please view results for these tests on the individual orders.   RED TOP HOLD   LAVENDER TOP HOLD   GOLD TOP HOLD   PINK TOP HOLD   EXTRA TUBES    Narrative:     The following orders were created for panel order EXTRA TUBES.  Procedure                               Abnormality         Status                     ---------                               -----------         ------                     Light Green Top Hold[[id number]]                             In process                   Please view results for these tests on the individual orders.   LIGHT GREEN TOP HOLD   TYPE &  SCREEN   GROUP & RH   TYPE & SCREEN   POCT GLUCOSE MONITORING CONTINUOUS   PREPARE RBC SOFT     EKG Readings: (Independently Interpreted)   Initial Reading: No STEMI.   Sinus tachycardia with PVCs    ECG Results    None       Imaging Results              X-Ray Chest AP Portable (Final result)  Result time 05/29/23 09:57:56      Final result by Danny Kline II, MD (05/29/23 09:57:56)                   Impression:      No evidence of acute cardiopulmonary disease.      Electronically signed by: Danny Kline  Date:    05/29/2023  Time:    09:57               Narrative:    EXAMINATION:  XR CHEST AP PORTABLE    CLINICAL HISTORY:  hypoxia;    COMPARISON:  22 May 2023    TECHNIQUE:  XR CHEST AP PORTABLE    FINDINGS:  The heart and mediastinum are stable in size and configuration.  The pulmonary vascularity is normal in caliber.  No lung infiltrates, effusions, pneumothorax or other abnormality is demonstrated.                                    X-Rays:   Independently Interpreted Readings:   Chest X-Ray: No acute findings (read by me independenly ).    Medications   0.9%  NaCl infusion (for blood administration) (has no administration in time range)   atorvastatin tablet 80 mg (80 mg Oral Given 5/29/23 1945)   sodium chloride 0.9% flush 10 mL (has no administration in time range)   naloxone 0.4 mg/mL injection 0.02 mg (has no administration in time range)   glucagon (human recombinant) injection 1 mg (has no administration in time range)   dextrose 10% bolus 125 mL 125 mL (has no administration in time range)   dextrose 10% bolus 250 mL 250 mL (has no administration in time range)   dextrose 40 % gel 15,000 mg (has no administration in time range)   dextrose 40 % gel 30,000 mg (has no administration in time range)   insulin aspart U-100 injection 0-5 Units (3 Units Subcutaneous Given 5/29/23 25)   sodium chloride 0.9% infusion (0 mL/hr  Stopped 5/28/23 2215)   lactated ringers bolus 1,000 mL (0 mLs Intravenous  Stopped 5/28/23 2200)   lactated ringers bolus 1,000 mL (0 mLs Intravenous Stopped 5/28/23 2345)   ondansetron injection 4 mg (4 mg Intravenous Given 5/29/23 0117)     Medical Decision Making:   Initial Assessment:   Pt was brought by EMS from [facility] due to a large amount of bleeding with blood clots from right arm AV fistula site.   Differential Diagnosis:   Bleeding, traumatic AV fistula, hypotension, type 2 MI, elevated troponin   ED Management:  Pt was given IV fluid with blood transfusion BP improved to 115/64. H &H showed 6.5/21 (Hg of 7.2 on 5/22/23) and repeat H &H after 2 units of blood given showed 8.8/25.9. Spoke with Dr. Moss (on call) and had discussion about the case. Dr. Moss recommended placing tight dressing around the right arm and remove the dressing in 2 hrs for evaluation of bleeding from the AV fistula site. Recommended sending pt back to NH if bleeding stopped and follow up with Dr. Sierra in clinic. If pt continues to have bleeding issues, recommended putting stitches to the site of bleeding but if we still see large amount of active bleeding from the site, recommended calling back Dr. Moss for further eval of the patient    Overall, Pt received 2 units of blood with IV fluids and BP had been fluctuating from low to normal.  Latest BP in ED showed 118/73 (@1:31AM). Pt was noted to have elevated troponin at 31.2 to 229.7 likely secondary to type 2 MI. EKG showed sinus tachycardia with PVCs. Discussed with Dr. Henry, [facility] medicine and agreed to admit pt for further eval and management. Pt will need to follow up with Dr. Sierra AM for further eval on right AV fistula bleeding. Tight Dressing applied to pt's right arm.           Attending Attestation:   Physician Attestation Statement for Resident:  As the supervising MD   Physician Attestation Statement: I have personally seen and examined this patient.   I agree with the above history.  -:   As the supervising MD I agree with the  above PE.     As the supervising MD I agree with the above treatment, course, plan, and disposition.                              Clinical Impression:   Final diagnoses:  [R58] Bleeding  [R77.8] Elevated troponin  [I95.9] Hypotension, unspecified hypotension type  [T82.9XXA] Complication of arteriovenous dialysis fistula, initial encounter (Primary)        ED Disposition Condition    Admit Stable                Rufino Dunn DO  Resident  05/29/23 0146       Jacob Gaines MD  05/30/23 0221

## 2023-05-29 NOTE — ASSESSMENT & PLAN NOTE
- Likely 2/2 to acute blood loss anemia  - Troponin x3 - 31, 229, pending  - EKG - sinus tachycardia

## 2023-05-29 NOTE — PLAN OF CARE
Problem: Skin Injury Risk Increased  Goal: Skin Health and Integrity  Outcome: Ongoing, Progressing  Intervention: Optimize Skin Protection  Flowsheets (Taken 5/29/2023 1612)  Pressure Reduction Techniques:   weight shift assistance provided   pressure points protected   heels elevated off bed  Pressure Reduction Devices: positioning supports utilized  Skin Protection:   adhesive use limited   tubing/devices free from skin contact  Head of Bed (HOB) Positioning: HOB at 30-45 degrees  Intervention: Promote and Optimize Oral Intake  Flowsheets (Taken 5/29/2023 1612)  Oral Nutrition Promotion:   safe use of adaptive equipment encouraged   rest periods promoted

## 2023-05-29 NOTE — SUBJECTIVE & OBJECTIVE
Past Medical History:   Diagnosis Date    CVA (cerebral vascular accident)     Dialysis patient     Elevated PSA     Gout, unspecified     Hypertension     Renal disorder     Rheumatoid arthritis, unspecified        Past Surgical History:   Procedure Laterality Date    AV FISTULA PLACEMENT Right 3/20/2023    Procedure: CREATION, AV FISTULA;  Surgeon: Alfred Sierra MD;  Location: [facility];  Service: General;  Laterality: Right;  right basilic vein transposition    HAND SURGERY Left     urolift      in [place];       Review of patient's allergies indicates:  No Known Allergies  Current Facility-Administered Medications   Medication Frequency    0.9%  NaCl infusion (for blood administration) Q24H PRN    atorvastatin tablet 80 mg QHS    dextrose 10% bolus 125 mL 125 mL PRN    dextrose 10% bolus 250 mL 250 mL PRN    dextrose 40 % gel 15,000 mg PRN    dextrose 40 % gel 30,000 mg PRN    glucagon (human recombinant) injection 1 mg PRN    insulin aspart U-100 injection 0-5 Units QID (AC + HS) PRN    naloxone 0.4 mg/mL injection 0.02 mg PRN    sodium chloride 0.9% flush 10 mL Q12H PRN     Family History       Problem Relation (Age of Onset)    Breast cancer Sister    Heart disease Father          Tobacco Use    Smoking status: Former     Types: Cigarettes     Quit date:      Years since quittin.4    Smokeless tobacco: Never   Substance and Sexual Activity    Alcohol use: Not Currently     Comment: quit in     Drug use: Never    Sexual activity: Not Currently     Review of Systems   Unable to perform ROS: Patient nonverbal   Objective:     Vital Signs (Most Recent):  Temp: 97.5 °F (36.4 °C) (23)  Pulse: 100 (23)  Resp: 20 (23)  BP: 136/80 (23)  SpO2: 100 % (23) Vital Signs (24h Range):  Temp:  [96.2 °F (35.7 °C)-97.5 °F (36.4 °C)] 97.5 °F (36.4 °C)  Pulse:  [] 100  Resp:  [13-34] 20  SpO2:  [84 %-100 %] 100 %  BP: ()/(47-89) 136/80      Weight: 55.3 kg (122 lb) (05/28/23 2152)  Body mass index is 18.55 kg/m².  Body surface area is 1.63 meters squared.    No intake/output data recorded.     Physical Exam  Vitals reviewed.   Constitutional:       General: He is not in acute distress.     Appearance: He is not toxic-appearing.   HENT:      Head: Normocephalic and atraumatic.      Nose: Nose normal.      Mouth/Throat:      Mouth: Mucous membranes are moist.      Pharynx: Oropharynx is clear.   Eyes:      General: No scleral icterus.     Conjunctiva/sclera: Conjunctivae normal.      Pupils: Pupils are equal, round, and reactive to light.   Cardiovascular:      Rate and Rhythm: Normal rate and regular rhythm.   Pulmonary:      Effort: No respiratory distress.      Breath sounds: Normal breath sounds.   Abdominal:      General: Bowel sounds are normal.      Palpations: Abdomen is soft. There is no mass.   Musculoskeletal:         General: No swelling or tenderness.      Cervical back: Normal range of motion and neck supple.   Lymphadenopathy:      Cervical: No cervical adenopathy.   Skin:     General: Skin is warm and dry.      Findings: No erythema.   Neurological:      Mental Status: Mental status is at baseline.      Comments: Patient was nonverbal, he did follow commands, he has some weakness involving his right side.   Psychiatric:         Mood and Affect: Mood normal.         Behavior: Behavior normal.        Significant Labs:  All labs within the past 24 hours have been reviewed.    Significant Imaging:

## 2023-05-29 NOTE — CONSULTS
[facility] - [address] [place] [address]  Adult Nutrition  Consult Note         Reason for Assessment  Reason For Assessment: consult (wounds)   Nutrition Risk Screen: no indicators present     Consult received and appreciated. Patient on a puree diet with ESRD and h/o stroke.   Recommend addition of Nepro TID due to low body weight, ESRD and wounds. Recommend addition of Asael to aide in wound healing.     Per MD notes:  HPI: Patient is an 82 year old male that was brought to [facility] via EMS for hemorrhage from AV fistula site. The patient has a PMHx of ESRD, HTN and CVA. The patient resides at [facility] and was reported to be bleeding from AV fistula site in right arm. It was reported that the patient had large amount of blood loss from the hemorrhage (photos in media). A pressure dressing was placed and the bleeding stopped. The patient has difficulty communicating verbally due to previous CVA but does nod in response to questions. The patient appears to deny any pain and does not appear in distress.      Malnutrition  Is Patient Malnourished: No  Skin Integrity  Randy Risk Assessment  Sensory Perception: 2-->very limited (right side)  Moisture: 3-->occasionally moist  Activity: 1-->bedfast  Mobility: 3-->slightly limited  Nutrition: 3-->adequate  Friction and Shear: 3-->no apparent problem  Randy Score: 15  Comments on skin integrity: altered skin  Nutrition Diagnosis  Increased protein Needs   related to Wound healing as evidenced by wounds    Nutrition Diagnosis Status: Chronic/ continues        Nutrition Risk  Level of Risk/Frequency of Follow-up: high     Recent Labs   Lab 05/29/23  0405   *     Comments on Glucose: elevated with stress response  Nutrition Prescription / Recommendations  Recommendation/Intervention: Recommend continue with puree diet. Recommend Nepro TID with meals  Goals: weight maintenance, intake 75-10%  Nutrition Goal Status: new  Current Diet Order:  puree diet  Oral Nutrition Supplement: Nepro TID with meals  Chewing or Swallowing Difficulty?: No Chewing or swallowing difficulty  Recommended Diet: Renal (High Protein)  puree  Recommended Oral Supplement: Nepro [420 kcals, 19g Protein, 38g Carbs(6g Fiber, 8g Sugar), 23g Fat] three times daily  Is Nutrition Support Recommended: No  Is Education Recommended: No  Monitor and Evaluation  % current Intake: Unknown/ No P.O. intake documented  % intake to meet estimated needs: 75 - 100 %  Food and Nutrient Intake: energy intake  Food and Nutrient Adminstration: diet order  Anthropometric Measurements: weight, weight change, body mass index  Biochemical Data, Medical Tests and Procedures: electrolyte and renal panel, glucose/endocrine profile, lipid profile, gastrointestinal profile, inflammatory profile  Nutrition-Focused Physical Findings: overall appearance, skin, extremities, muscles and bones, head and eyes     Current Medical Diagnosis and Past Medical History     Past Medical History:   Diagnosis Date    CVA (cerebral vascular accident)     Dialysis patient     Elevated PSA     Gout, unspecified     Hypertension     Renal disorder     Rheumatoid arthritis, unspecified      Nutrition/Diet History  Food Allergies: NKFA  Factors Affecting Nutritional Intake: None identified at this time  Lab/Procedures/Meds  Recent Labs   Lab 05/28/23  2140 05/29/23  0405    135*   K 4.4 4.9   BUN 48* 55*   CREATININE 6.56* 6.61*   CALCIUM 8.4* 8.7   ALBUMIN 1.6*  --     98   ALT 18  --    AST 31  --      Last A1c: No results found for: HGBA1C  Lab Results   Component Value Date    RBC 2.95 (L) 05/29/2023    HGB 9.1 (L) 05/29/2023    HCT 26.5 (L) 05/29/2023    MCV 89.8 05/29/2023    MCH 30.8 05/29/2023    MCHC 34.3 05/29/2023    TIBC 171 (L) 05/12/2023     Pertinent Labs Reviewed: reviewed  Pertinent Labs Comments: Sodium: 135 (L)  Potassium: 4.9  Chloride: 98  CO2: 27  Anion Gap: 15  BUN: 55 (H)  Creatinine: 6.61  "(H)  BUN/CREAT RATIO: 8  eGFR: 8 (L)  Glucose: 179 (H)  Calcium: 8.7      (L): Data is abnormally low  (H): Data is abnormally high  Pertinent Medications Reviewed: reviewed  Pertinent Medications Comments: atrovastatin  Anthropometrics  Temp: 97.5 °F (36.4 °C)  Height: 5' 8" (172.7 cm)  Height (inches): 68 in  Weight Method: Estimated  Weight: 55.3 kg (122 lb)  Weight (lb): 122 lb  Ideal Body Weight (IBW), Male: 154 lb     Estimated/Assessed Needs        Temp: 97.5 °F (36.4 °C)Axillary  Weight Used For Calorie Calculations: 55.3 kg (121 lb 14.6 oz)   Energy Need Method: Kcal/kg Energy Calorie Requirements (kcal): 9728-1898  Weight Used For Protein Calculations: 55.3 kg (121 lb 14.6 oz)  Protein Requirements: 66-76  Estimated Fluid Requirement Method: RDA Method    RDA Method (mL): 1382     Nutrition by Nursing              Last Bowel Movement: 05/29/23              Nutrition Follow-Up  RD Follow-up?: Yes     "

## 2023-05-29 NOTE — ASSESSMENT & PLAN NOTE
No current active hemorrhage; dressing applied.      Elevate the arm with Ace wrap.  We will take dressing down evaluate tomorrow and get vascular Doppler to evaluate fistula.    If fistula is clotted, patient will need tunneled dialysis catheter and possible declot or later staged revision.  Follow-up nephrology recommendations on timely need for dialysis.

## 2023-05-30 PROBLEM — R79.89 ELEVATED TROPONIN: Status: ACTIVE | Noted: 2023-05-30

## 2023-05-30 LAB
ANION GAP SERPL CALCULATED.3IONS-SCNC: 17 MMOL/L (ref 7–16)
BASOPHILS # BLD AUTO: 0.03 K/UL (ref 0–0.2)
BASOPHILS NFR BLD AUTO: 0.2 % (ref 0–1)
BUN SERPL-MCNC: 75 MG/DL (ref 7–18)
BUN/CREAT SERPL: 9 (ref 6–20)
CALCIUM SERPL-MCNC: 9 MG/DL (ref 8.5–10.1)
CHLORIDE SERPL-SCNC: 102 MMOL/L (ref 98–107)
CO2 SERPL-SCNC: 26 MMOL/L (ref 21–32)
CREAT SERPL-MCNC: 8.2 MG/DL (ref 0.7–1.3)
DIFFERENTIAL METHOD BLD: ABNORMAL
EGFR (NO RACE VARIABLE) (RUSH/TITUS): 6 ML/MIN/1.73M2
EOSINOPHIL # BLD AUTO: 0.02 K/UL (ref 0–0.5)
EOSINOPHIL NFR BLD AUTO: 0.1 % (ref 1–4)
ERYTHROCYTE [DISTWIDTH] IN BLOOD BY AUTOMATED COUNT: 15.5 % (ref 11.5–14.5)
GLUCOSE SERPL-MCNC: 116 MG/DL (ref 70–105)
GLUCOSE SERPL-MCNC: 118 MG/DL (ref 74–106)
GLUCOSE SERPL-MCNC: 128 MG/DL (ref 70–105)
GLUCOSE SERPL-MCNC: 250 MG/DL (ref 70–105)
HCT VFR BLD AUTO: 22.4 % (ref 40–54)
HGB BLD-MCNC: 7.7 G/DL (ref 13.5–18)
IMM GRANULOCYTES # BLD AUTO: 0.09 K/UL (ref 0–0.04)
IMM GRANULOCYTES NFR BLD: 0.5 % (ref 0–0.4)
LYMPHOCYTES # BLD AUTO: 1.4 K/UL (ref 1–4.8)
LYMPHOCYTES NFR BLD AUTO: 8.4 % (ref 27–41)
MCH RBC QN AUTO: 31 PG (ref 27–31)
MCHC RBC AUTO-ENTMCNC: 34.4 G/DL (ref 32–36)
MCV RBC AUTO: 90.3 FL (ref 80–96)
MONOCYTES # BLD AUTO: 0.85 K/UL (ref 0–0.8)
MONOCYTES NFR BLD AUTO: 5.1 % (ref 2–6)
MPC BLD CALC-MCNC: 12.5 FL (ref 9.4–12.4)
NEUTROPHILS # BLD AUTO: 14.28 K/UL (ref 1.8–7.7)
NEUTROPHILS NFR BLD AUTO: 85.7 % (ref 53–65)
NRBC # BLD AUTO: 0 X10E3/UL
NRBC, AUTO (.00): 0 %
PLATELET # BLD AUTO: 143 K/UL (ref 150–400)
POTASSIUM SERPL-SCNC: 5.6 MMOL/L (ref 3.5–5.1)
RBC # BLD AUTO: 2.48 M/UL (ref 4.6–6.2)
SODIUM SERPL-SCNC: 139 MMOL/L (ref 136–145)
WBC # BLD AUTO: 16.67 K/UL (ref 4.5–11)

## 2023-05-30 PROCEDURE — 63600175 PHARM REV CODE 636 W HCPCS

## 2023-05-30 PROCEDURE — 85025 COMPLETE CBC W/AUTO DIFF WBC: CPT

## 2023-05-30 PROCEDURE — 80048 BASIC METABOLIC PNL TOTAL CA: CPT

## 2023-05-30 PROCEDURE — 25000003 PHARM REV CODE 250: Performed by: INTERNAL MEDICINE

## 2023-05-30 PROCEDURE — 99232 PR SUBSEQUENT HOSPITAL CARE,LEVL II: ICD-10-PCS | Mod: ,,, | Performed by: INTERNAL MEDICINE

## 2023-05-30 PROCEDURE — 27000221 HC OXYGEN, UP TO 24 HOURS

## 2023-05-30 PROCEDURE — 99232 SBSQ HOSP IP/OBS MODERATE 35: CPT | Mod: ,,, | Performed by: INTERNAL MEDICINE

## 2023-05-30 PROCEDURE — 25000003 PHARM REV CODE 250

## 2023-05-30 PROCEDURE — 82962 GLUCOSE BLOOD TEST: CPT

## 2023-05-30 PROCEDURE — 11000001 HC ACUTE MED/SURG PRIVATE ROOM

## 2023-05-30 RX ORDER — SODIUM CHLORIDE 9 MG/ML
INJECTION, SOLUTION INTRAVENOUS
Status: DISPENSED
Start: 2023-05-30 | End: 2023-05-30

## 2023-05-30 RX ADMIN — SODIUM ZIRCONIUM CYCLOSILICATE 10 G: 10 POWDER, FOR SUSPENSION ORAL at 03:05

## 2023-05-30 RX ADMIN — SODIUM ZIRCONIUM CYCLOSILICATE 10 G: 10 POWDER, FOR SUSPENSION ORAL at 11:05

## 2023-05-30 RX ADMIN — INSULIN ASPART 1 UNITS: 100 INJECTION, SOLUTION INTRAVENOUS; SUBCUTANEOUS at 08:05

## 2023-05-30 RX ADMIN — ATORVASTATIN CALCIUM 80 MG: 80 TABLET, FILM COATED ORAL at 08:05

## 2023-05-30 NOTE — SUBJECTIVE & OBJECTIVE
Interval History:  Stable, no acute events overnight.  Dressing to right upper extremity clean dry and intact with no shadowing.  Distal forearm with slight swelling    Medications:  Continuous Infusions:  Scheduled Meds:   atorvastatin  80 mg Oral QHS     PRN Meds:sodium chloride, dextrose 10%, dextrose 10%, dextrose, dextrose, glucagon (human recombinant), insulin aspart U-100, naloxone, sodium chloride 0.9%     Review of patient's allergies indicates:  No Known Allergies  Objective:     Vital Signs (Most Recent):  Temp: 98.7 °F (37.1 °C) (05/30/23 0448)  Pulse: 101 (05/30/23 0448)  Resp: 20 (05/30/23 0448)  BP: (!) 157/70 (05/30/23 0448)  SpO2: 97 % (05/30/23 0448) Vital Signs (24h Range):  Temp:  [97.5 °F (36.4 °C)-98.7 °F (37.1 °C)] 98.7 °F (37.1 °C)  Pulse:  [] 101  Resp:  [18-20] 20  SpO2:  [97 %-100 %] 97 %  BP: (117-157)/(62-84) 157/70     Weight: 55.3 kg (122 lb)  Body mass index is 18.55 kg/m².    Intake/Output - Last 3 Shifts         05/28 0700  05/29 0659 05/29 0700  05/30 0659 05/30 0700  05/31 0659    P.O.  0     Total Intake(mL/kg)  0 (0)     Net  0                     Physical Exam  Constitutional:       General: He is not in acute distress.     Appearance: Normal appearance.   HENT:      Head: Normocephalic.   Cardiovascular:      Rate and Rhythm: Normal rate.   Pulmonary:      Effort: Pulmonary effort is normal. No respiratory distress.   Abdominal:      General: There is no distension.      Tenderness: There is no abdominal tenderness.   Musculoskeletal:         General: Normal range of motion.      Comments: Right upper extremity dressing removed and no bleeding from previous fistula site.  Distal forearm swollen from pressure dressing   Skin:     General: Skin is warm.      Coloration: Skin is not jaundiced.   Neurological:      Mental Status: He is alert. Mental status is at baseline.      Cranial Nerves: No cranial nerve deficit.      Comments: Right-sided weakness        Significant  Labs:  I have reviewed all pertinent lab results within the past 24 hours.  CBC:   Recent Labs   Lab 05/30/23  0437   WBC 16.67*   RBC 2.48*   HGB 7.7*   HCT 22.4*   *   MCV 90.3   MCH 31.0   MCHC 34.4     BMP:   Recent Labs   Lab 05/30/23  0439   *      K 5.6*      CO2 26   BUN 75*   CREATININE 8.20*   CALCIUM 9.0       Significant Diagnostics:  I have reviewed all pertinent imaging results/findings within the past 24 hours.

## 2023-05-30 NOTE — ASSESSMENT & PLAN NOTE
No current active hemorrhage; dressing applied.      Elevate the arm with Ace wrap.  We will take dressing down evaluate tomorrow and get vascular Doppler to evaluate fistula.    If fistula is clotted, patient will need tunneled dialysis catheter and possible declot or later staged revision.  Follow-up nephrology recommendations on timely need for dialysis.    5/30:  Dressing removed and no bleeding.  Simple dressing placed.  Right arm elevated on pillow to help with swelling.  We will get right upper extremity venous Doppler to evaluate fistula.  If clotted, patient will need possible declot and possible insertion of tunneled hemodialysis catheter

## 2023-05-30 NOTE — PROGRESS NOTES
[facility] - [facility]  General Surgery  Progress Note    Subjective:     History of Present Illness:  82-year-old  male presented to [facility] for bleeding from previous AV fistula site.  Patient had a right upper extremity AV fistula created in March by Dr. Sierra.  This was recently accessed and he has been having bleeding at this site; a pressure dressing was applied, but the area was still having oozing.  Patient is nonverbal for CVA and also can not use the right upper extremity.  Patient was found to have a hemoglobin of 6.5 and given 2 units of blood and responded appropriately.  Bleeding did get controlled the ER with pressure dressing.  Patient was found to have elevated troponins and was admitted to the hospital for medical management.  General surgery consulted to evaluate fistula and bleeding.  Unable to obtain review of systems due to patient being nonverbal.      Post-Op Info:  * No surgery found *         Interval History:  Stable, no acute events overnight.  Dressing to right upper extremity clean dry and intact with no shadowing.  Distal forearm with slight swelling    Medications:  Continuous Infusions:  Scheduled Meds:   atorvastatin  80 mg Oral QHS     PRN Meds:sodium chloride, dextrose 10%, dextrose 10%, dextrose, dextrose, glucagon (human recombinant), insulin aspart U-100, naloxone, sodium chloride 0.9%     Review of patient's allergies indicates:  No Known Allergies  Objective:     Vital Signs (Most Recent):  Temp: 98.7 °F (37.1 °C) (05/30/23 0448)  Pulse: 101 (05/30/23 0448)  Resp: 20 (05/30/23 0448)  BP: (!) 157/70 (05/30/23 0448)  SpO2: 97 % (05/30/23 0448) Vital Signs (24h Range):  Temp:  [97.5 °F (36.4 °C)-98.7 °F (37.1 °C)] 98.7 °F (37.1 °C)  Pulse:  [] 101  Resp:  [18-20] 20  SpO2:  [97 %-100 %] 97 %  BP: (117-157)/(62-84) 157/70     Weight: 55.3 kg (122 lb)  Body mass index is 18.55 kg/m².    Intake/Output - Last 3 Shifts         05/28  0700  05/29 0659 05/29 0700  05/30 0659 05/30 0700 05/31 0659    P.O.  0     Total Intake(mL/kg)  0 (0)     Net  0                     Physical Exam  Constitutional:       General: He is not in acute distress.     Appearance: Normal appearance.   HENT:      Head: Normocephalic.   Cardiovascular:      Rate and Rhythm: Normal rate.   Pulmonary:      Effort: Pulmonary effort is normal. No respiratory distress.   Abdominal:      General: There is no distension.      Tenderness: There is no abdominal tenderness.   Musculoskeletal:         General: Normal range of motion.      Comments: Right upper extremity dressing removed and no bleeding from previous fistula site.  Distal forearm swollen from pressure dressing   Skin:     General: Skin is warm.      Coloration: Skin is not jaundiced.   Neurological:      Mental Status: He is alert. Mental status is at baseline.      Cranial Nerves: No cranial nerve deficit.      Comments: Right-sided weakness        Significant Labs:  I have reviewed all pertinent lab results within the past 24 hours.  CBC:   Recent Labs   Lab 05/30/23  0437   WBC 16.67*   RBC 2.48*   HGB 7.7*   HCT 22.4*   *   MCV 90.3   MCH 31.0   MCHC 34.4     BMP:   Recent Labs   Lab 05/30/23  0439   *      K 5.6*      CO2 26   BUN 75*   CREATININE 8.20*   CALCIUM 9.0       Significant Diagnostics:  I have reviewed all pertinent imaging results/findings within the past 24 hours.    Assessment/Plan:     * Hemorrhage of arteriovenous fistula  No current active hemorrhage; dressing applied.      Elevate the arm with Ace wrap.  We will take dressing down evaluate tomorrow and get vascular Doppler to evaluate fistula.    If fistula is clotted, patient will need tunneled dialysis catheter and possible declot or later staged revision.  Follow-up nephrology recommendations on timely need for dialysis.    5/30:  Dressing removed and no bleeding.  Simple dressing placed.  Right arm elevated on  pillow to help with swelling.  We will get right upper extremity venous Doppler to evaluate fistula.  If clotted, patient will need possible declot and possible insertion of tunneled hemodialysis catheter        Da Flores, DO  General Surgery  [facility] - [address] [place] [address]

## 2023-05-30 NOTE — SUBJECTIVE & OBJECTIVE
Interval History:     NAEO  Pts fistula is clotted, gen surg to get a new line  Awaiting cardiology recs      Review of Systems   Unable to perform ROS: Patient nonverbal   Objective:     Vital Signs (Most Recent):  Temp: 98.2 °F (36.8 °C) (05/30/23 0809)  Pulse: 76 (05/30/23 0809)  Resp: 16 (05/30/23 0809)  BP: (!) 159/75 (05/30/23 0809)  SpO2: 95 % (05/30/23 0809) Vital Signs (24h Range):  Temp:  [97.8 °F (36.6 °C)-98.7 °F (37.1 °C)] 98.2 °F (36.8 °C)  Pulse:  [] 76  Resp:  [16-20] 16  SpO2:  [95 %-100 %] 95 %  BP: (117-159)/(62-84) 159/75     Weight: 55.3 kg (122 lb)  Body mass index is 18.55 kg/m².    Intake/Output Summary (Last 24 hours) at 5/30/2023 1049  Last data filed at 5/29/2023 1721  Gross per 24 hour   Intake 0 ml   Output --   Net 0 ml         Physical Exam  Vitals reviewed.   Constitutional:       General: He is not in acute distress.     Appearance: He is not toxic-appearing.   HENT:      Head: Normocephalic and atraumatic.      Nose: Nose normal.      Mouth/Throat:      Mouth: Mucous membranes are moist.      Pharynx: Oropharynx is clear.   Eyes:      General: No scleral icterus.     Conjunctiva/sclera: Conjunctivae normal.      Pupils: Pupils are equal, round, and reactive to light.   Cardiovascular:      Rate and Rhythm: Normal rate and regular rhythm.   Pulmonary:      Effort: No respiratory distress.      Breath sounds: Normal breath sounds.   Abdominal:      General: Bowel sounds are normal.      Palpations: Abdomen is soft. There is no mass.   Musculoskeletal:         General: No swelling or tenderness.      Cervical back: Normal range of motion and neck supple.   Lymphadenopathy:      Cervical: No cervical adenopathy.   Skin:     General: Skin is warm and dry.      Findings: No erythema.   Neurological:      Mental Status: Mental status is at baseline.      Comments: Patient was nonverbal, he did follow commands, he has some weakness involving his right side.   Psychiatric:         Mood  and Affect: Mood normal.         Behavior: Behavior normal.           Significant Labs: All pertinent labs within the past 24 hours have been reviewed.    Significant Imaging: I have reviewed all pertinent imaging results/findings within the past 24 hours.

## 2023-05-30 NOTE — PROGRESS NOTES
Patient is awake alert he fixes and follows and nodded his head appropriately to interrogations.    Physical exam general patient is chronically ill-appearing, heart is regular rate and rhythm, he has no pitting edema, lungs are clear to auscultation anteriorly, abdomen is soft with positive bowel sounds     Assessment/plan 1.  ESRD-this patient dialyzes on TTS basis, his AV access is clotted, plan is for a tunneled dialysis catheter tomorrow.  +/-on a declot attempt at that time.  2.  Hyperkalemia-I will give the patient a couple of doses Lokelma , the patient's potassium was 5.6 today  3. Av access thrombosis see 1. Above   4.  Hyperlipidemia    Vitals:    05/30/23 0020 05/30/23 0448 05/30/23 0809 05/30/23 1201   BP: 117/75 (!) 157/70 (!) 159/75 116/80   Pulse: 98 101 76 104   Resp: 18 20 16 20   Temp: 98.4 °F (36.9 °C) 98.7 °F (37.1 °C) 98.2 °F (36.8 °C) 98.4 °F (36.9 °C)   TempSrc:       SpO2: 98% 97% 95% 99%   Weight:       Height:

## 2023-05-30 NOTE — PROGRESS NOTES
[facility]  Wound Care    Patient Name:  Pardeep Collins   MRN:  [id number]  Date: 2023  Diagnosis: Hemorrhage of arteriovenous fistula    History:     Past Medical History:   Diagnosis Date    CVA (cerebral vascular accident)     Dialysis patient     Elevated PSA     Gout, unspecified     Hypertension     Renal disorder     Rheumatoid arthritis, unspecified        Social History     Socioeconomic History    Marital status: Single   Tobacco Use    Smoking status: Former     Types: Cigarettes     Quit date:      Years since quittin.4    Smokeless tobacco: Never   Substance and Sexual Activity    Alcohol use: Not Currently     Comment: quit in     Drug use: Never    Sexual activity: Not Currently     Social Determinants of Health     Financial Resource Strain: Low Risk     Difficulty of Paying Living Expenses: Not hard at all   Food Insecurity: No Food Insecurity    Worried About Running Out of Food in the Last Year: Never true    Ran Out of Food in the Last Year: Never true   Transportation Needs: No Transportation Needs    Lack of Transportation (Medical): No    Lack of Transportation (Non-Medical): No   Physical Activity: Inactive    Days of Exercise per Week: 2 days    Minutes of Exercise per Session: 0 min   Stress: No Stress Concern Present    Feeling of Stress : Not at all   Social Connections: Socially Isolated    Frequency of Communication with Friends and Family: Once a week    Frequency of Social Gatherings with Friends and Family: Once a week    Attends Yarsanism Services: More than 4 times per year    Active Member of Clubs or Organizations: No    Attends Club or Organization Meetings: Never    Marital Status: Never    Housing Stability: Low Risk     Unable to Pay for Housing in the Last Year: No    Number of Places Lived in the Last Year: 1    Unstable Housing in the Last Year: No       Precautions:     Allergies as of 2023    (No Known  "Allergies)       WOC Assessment Details/Treatment     WOC Team consulted for "Coccyx"    Narrative: Pt received in bed, able to turn with assistance and direction.    Active Wounds: Stage 2 PI to coccyx.    Goals per TIME Model: Promote moist wound healing, Moisture management, Apply antimicrobial, Reduce bioburden, and Educate on proper wound management post D/C     Barriers to Wound Healing: multiple co-morbidities advanced age fragile skin no protective sensation    Recommendations made to primary team for Routine skin care; wound care; Randy Score Interventions .     Orders placed.    Thank you for the consult.     We will continue to follow. See additional note under Notes TAB for tentative f/u plan/dates.    05/30/2023   "

## 2023-05-30 NOTE — PLAN OF CARE
Per  possible d/c tomorrow or thurs. Cherri with [employer] informed to start precert for ins. Ss following.   Statement Selected

## 2023-05-30 NOTE — PROGRESS NOTES
[facility] - [address] [place] [address] Medicine  Progress Note    Patient Name: Pardeep Collins  MRN: [id number]  Patient Class: IP- Inpatient   Admission Date: 5/28/2023  Length of Stay: 1 days  Attending Physician: Rodrigo Alba MD  Primary Care Provider: Provider Not[employer]ystem        Subjective:     Principal Problem:Hemorrhage of arteriovenous fistula        HPI:  Patient is an 82 year old male that was brought to [facility] via EMS for hemorrhage from AV fistula site. The patient has a PMHx of ESRD, HTN and CVA. The patient resides at [facility] and was reported to be bleeding from AV fistula site in right arm. It was reported that the patient had large amount of blood loss from the hemorrhage (photos in media). A pressure dressing was placed and the bleeding stopped. The patient has difficulty communicating verbally due to previous CVA but does nod in response to questions. The patient appears to deny any pain and does not appear in distress.    In ED, patient was noted to have /89 and this dropped to 70s systolic. In the ED the patient received 2L lactated ringers and 2 units pRBCs with initial hemoglobin of 6.5, improving to 8.8. The patient reported no chest pain, however troponin level was checked x2 with 31 and an increase to 229 seen.      Per chart review, the patient sees Dr. Aceves as his Nephrologist and had his AV fistula (Right arm) placed by Dr. Sierra on 3/20/23.  The patient has dialysis scheduled Tuesday, Thursday and Saturday. Last dialysis was Saturday 5/27/2023.     The patient will be admitted to [facility] for continued care and medical management.       Overview/Hospital Course:  No notes on file    Interval History:     NAEO  Pts fistula is clotted, gen surg to get a new line  Awaiting cardiology recs      Review of Systems   Unable to perform ROS: Patient nonverbal   Objective:     Vital Signs (Most Recent):  Temp: 98.2 °F (36.8 °C) (05/30/23  0809)  Pulse: 76 (05/30/23 0809)  Resp: 16 (05/30/23 0809)  BP: (!) 159/75 (05/30/23 0809)  SpO2: 95 % (05/30/23 0809) Vital Signs (24h Range):  Temp:  [97.8 °F (36.6 °C)-98.7 °F (37.1 °C)] 98.2 °F (36.8 °C)  Pulse:  [] 76  Resp:  [16-20] 16  SpO2:  [95 %-100 %] 95 %  BP: (117-159)/(62-84) 159/75     Weight: 55.3 kg (122 lb)  Body mass index is 18.55 kg/m².    Intake/Output Summary (Last 24 hours) at 5/30/2023 1049  Last data filed at 5/29/2023 1721  Gross per 24 hour   Intake 0 ml   Output --   Net 0 ml         Physical Exam  Vitals reviewed.   Constitutional:       General: He is not in acute distress.     Appearance: He is not toxic-appearing.   HENT:      Head: Normocephalic and atraumatic.      Nose: Nose normal.      Mouth/Throat:      Mouth: Mucous membranes are moist.      Pharynx: Oropharynx is clear.   Eyes:      General: No scleral icterus.     Conjunctiva/sclera: Conjunctivae normal.      Pupils: Pupils are equal, round, and reactive to light.   Cardiovascular:      Rate and Rhythm: Normal rate and regular rhythm.   Pulmonary:      Effort: No respiratory distress.      Breath sounds: Normal breath sounds.   Abdominal:      General: Bowel sounds are normal.      Palpations: Abdomen is soft. There is no mass.   Musculoskeletal:         General: No swelling or tenderness.      Cervical back: Normal range of motion and neck supple.   Lymphadenopathy:      Cervical: No cervical adenopathy.   Skin:     General: Skin is warm and dry.      Findings: No erythema.   Neurological:      Mental Status: Mental status is at baseline.      Comments: Patient was nonverbal, he did follow commands, he has some weakness involving his right side.   Psychiatric:         Mood and Affect: Mood normal.         Behavior: Behavior normal.           Significant Labs: All pertinent labs within the past 24 hours have been reviewed.    Significant Imaging: I have reviewed all pertinent imaging results/findings within the past 24  hours.      Assessment/Plan:      * Hemorrhage of arteriovenous fistula  - R arm bleeding while at nursing home 5/28/2023  - Bleeding stopped in ED with pressure dressing  - Successful dialysis last reported on 5/27/2023  - Holding home aspirin  - General surgery consulted, appreciate assistance  - AV fistula (Right arm) placed by Dr. Sierra on 3/20/23.  - Initial H/H 6.5/21, after 2 units of pRBCs H/H 8.8/25.9    Anemia  - Initial hemoglobin in ED 6.5, after 2 units pRBCs hemoglobin 8.8  - Will continue to monitor      Type 2 MI (myocardial infarction)  - Likely 2/2 to acute blood loss anemia  - Troponin x3 - 31, 229, pending  - EKG - sinus tachycardia    5/30-cardiology recs pending.     Primary hypertension  - Patient hypotensive on admission  - Holding home medication      ESRD (end stage renal disease)  - Patient reportedly followed by Dr. Aceves  - Dialysis schedule Tuesday, Thursday, Saturday  - Nephrology consulted, appreciate assistance  - Last dialysis Saturday 5/27/2023        VTE Risk Mitigation (From admission, onward)         Ordered     Reason for No Pharmacological VTE Prophylaxis  Once        Question:  Reasons:  Answer:  Active Bleeding    05/29/23 0154     IP VTE HIGH RISK PATIENT  Once         05/29/23 0154     Place sequential compression device  Until discontinued         05/29/23 0154                Discharge Planning   RASHAD:      Code Status: Full Code   Is the patient medically ready for discharge?:     Reason for patient still in hospital (select all that apply): Treatment  Discharge Plan A: Return to nursing home                  Rehmat NATALI Alba MD  Department of Hospital Medicine   [facility] - [facility] Telemetry

## 2023-05-30 NOTE — ASSESSMENT & PLAN NOTE
- Likely 2/2 to acute blood loss anemia  - Troponin x3 - 31, 229, pending  - EKG - sinus tachycardia    5/30-cardiology recs pending.

## 2023-05-31 ENCOUNTER — ANESTHESIA EVENT (OUTPATIENT)
Dept: SURGERY | Facility: HOSPITAL | Age: 83
DRG: 280 | End: 2023-05-31
Payer: MEDICARE

## 2023-05-31 ENCOUNTER — ANESTHESIA (OUTPATIENT)
Dept: SURGERY | Facility: HOSPITAL | Age: 83
DRG: 280 | End: 2023-05-31
Payer: MEDICARE

## 2023-05-31 LAB
ABO + RH BLD: NORMAL
ANION GAP SERPL CALCULATED.3IONS-SCNC: 17 MMOL/L (ref 7–16)
BASOPHILS # BLD AUTO: 0.01 K/UL (ref 0–0.2)
BASOPHILS NFR BLD AUTO: 0.1 % (ref 0–1)
BLD PROD TYP BPU: NORMAL
BLOOD UNIT EXPIRATION DATE: NORMAL
BLOOD UNIT TYPE CODE: 5100
BUN SERPL-MCNC: 90 MG/DL (ref 7–18)
BUN/CREAT SERPL: 9 (ref 6–20)
CALCIUM SERPL-MCNC: 9 MG/DL (ref 8.5–10.1)
CHLORIDE SERPL-SCNC: 103 MMOL/L (ref 98–107)
CO2 SERPL-SCNC: 25 MMOL/L (ref 21–32)
CREAT SERPL-MCNC: 9.85 MG/DL (ref 0.7–1.3)
CROSSMATCH INTERPRETATION: NORMAL
DIFFERENTIAL METHOD BLD: ABNORMAL
DISPENSE STATUS: NORMAL
EGFR (NO RACE VARIABLE) (RUSH/TITUS): 5 ML/MIN/1.73M2
EOSINOPHIL # BLD AUTO: 0 K/UL (ref 0–0.5)
EOSINOPHIL NFR BLD AUTO: 0 % (ref 1–4)
ERYTHROCYTE [DISTWIDTH] IN BLOOD BY AUTOMATED COUNT: 14.3 % (ref 11.5–14.5)
GLUCOSE SERPL-MCNC: 112 MG/DL (ref 70–105)
GLUCOSE SERPL-MCNC: 113 MG/DL (ref 70–105)
GLUCOSE SERPL-MCNC: 119 MG/DL (ref 70–105)
GLUCOSE SERPL-MCNC: 94 MG/DL (ref 74–106)
HCT VFR BLD AUTO: 19.5 % (ref 40–54)
HCT VFR BLD AUTO: 27.2 % (ref 40–54)
HCT VFR BLD AUTO: 27.5 % (ref 40–54)
HGB BLD-MCNC: 6.4 G/DL (ref 13.5–18)
HGB BLD-MCNC: 8.9 G/DL (ref 13.5–18)
HGB BLD-MCNC: 9.1 G/DL (ref 13.5–18)
IMM GRANULOCYTES # BLD AUTO: 0.21 K/UL (ref 0–0.04)
IMM GRANULOCYTES NFR BLD: 2.1 % (ref 0–0.4)
LYMPHOCYTES # BLD AUTO: 0.86 K/UL (ref 1–4.8)
LYMPHOCYTES NFR BLD AUTO: 8.8 % (ref 27–41)
LYMPHOCYTES NFR BLD MANUAL: 9 % (ref 27–41)
MCH RBC QN AUTO: 30.9 PG (ref 27–31)
MCHC RBC AUTO-ENTMCNC: 32.8 G/DL (ref 32–36)
MCV RBC AUTO: 94.2 FL (ref 80–96)
MONOCYTES # BLD AUTO: 0.76 K/UL (ref 0–0.8)
MONOCYTES NFR BLD AUTO: 7.7 % (ref 2–6)
MONOCYTES NFR BLD MANUAL: 6 % (ref 2–6)
MPC BLD CALC-MCNC: 12.5 FL (ref 9.4–12.4)
NEUTROPHILS # BLD AUTO: 7.97 K/UL (ref 1.8–7.7)
NEUTROPHILS NFR BLD AUTO: 81.3 % (ref 53–65)
NEUTS SEG NFR BLD MANUAL: 85 % (ref 50–62)
NRBC # BLD AUTO: 0 X10E3/UL
NRBC, AUTO (.00): 0 %
PLATELET # BLD AUTO: 135 K/UL (ref 150–400)
PLATELET MORPHOLOGY: ABNORMAL
POTASSIUM SERPL-SCNC: 5.2 MMOL/L (ref 3.5–5.1)
RBC # BLD AUTO: 2.07 M/UL (ref 4.6–6.2)
RBC MORPH BLD: NORMAL
SODIUM SERPL-SCNC: 140 MMOL/L (ref 136–145)
UNIT NUMBER: NORMAL
WBC # BLD AUTO: 9.81 K/UL (ref 4.5–11)

## 2023-05-31 PROCEDURE — 90935 HEMODIALYSIS ONE EVALUATION: CPT

## 2023-05-31 PROCEDURE — 99223 PR INITIAL HOSPITAL CARE,LEVL III: ICD-10-PCS | Mod: ,,, | Performed by: INTERNAL MEDICINE

## 2023-05-31 PROCEDURE — 63600175 PHARM REV CODE 636 W HCPCS: Performed by: INTERNAL MEDICINE

## 2023-05-31 PROCEDURE — 99223 1ST HOSP IP/OBS HIGH 75: CPT | Mod: ,,, | Performed by: INTERNAL MEDICINE

## 2023-05-31 PROCEDURE — C1750 CATH, HEMODIALYSIS,LONG-TERM: HCPCS | Performed by: SURGERY

## 2023-05-31 PROCEDURE — 63600175 PHARM REV CODE 636 W HCPCS: Performed by: NURSE ANESTHETIST, CERTIFIED REGISTERED

## 2023-05-31 PROCEDURE — 25000003 PHARM REV CODE 250: Performed by: SURGERY

## 2023-05-31 PROCEDURE — 85014 HEMATOCRIT: CPT | Performed by: INTERNAL MEDICINE

## 2023-05-31 PROCEDURE — 77001 CHG FLUOROGUIDE CNTRL VEN ACCESS,PLACE,REPLACE,REMOVE: ICD-10-PCS | Mod: 26,,, | Performed by: SURGERY

## 2023-05-31 PROCEDURE — 99233 PR SUBSEQUENT HOSPITAL CARE,LEVL III: ICD-10-PCS | Mod: ,,, | Performed by: INTERNAL MEDICINE

## 2023-05-31 PROCEDURE — 93010 ELECTROCARDIOGRAM REPORT: CPT | Mod: ,,, | Performed by: INTERNAL MEDICINE

## 2023-05-31 PROCEDURE — 80048 BASIC METABOLIC PNL TOTAL CA: CPT

## 2023-05-31 PROCEDURE — 11000001 HC ACUTE MED/SURG PRIVATE ROOM

## 2023-05-31 PROCEDURE — 85025 COMPLETE CBC W/AUTO DIFF WBC: CPT

## 2023-05-31 PROCEDURE — 25000003 PHARM REV CODE 250: Performed by: INTERNAL MEDICINE

## 2023-05-31 PROCEDURE — 37000009 HC ANESTHESIA EA ADD 15 MINS: Performed by: SURGERY

## 2023-05-31 PROCEDURE — 36000707: Performed by: SURGERY

## 2023-05-31 PROCEDURE — 99233 SBSQ HOSP IP/OBS HIGH 50: CPT | Mod: ,,, | Performed by: INTERNAL MEDICINE

## 2023-05-31 PROCEDURE — 36000706: Performed by: SURGERY

## 2023-05-31 PROCEDURE — D9220A PRA ANESTHESIA: ICD-10-PCS | Mod: CRNA,,, | Performed by: NURSE ANESTHETIST, CERTIFIED REGISTERED

## 2023-05-31 PROCEDURE — D9220A PRA ANESTHESIA: Mod: ANES,,, | Performed by: ANESTHESIOLOGY

## 2023-05-31 PROCEDURE — 93010 EKG 12-LEAD: ICD-10-PCS | Mod: ,,, | Performed by: INTERNAL MEDICINE

## 2023-05-31 PROCEDURE — 77001 FLUOROGUIDE FOR VEIN DEVICE: CPT | Mod: 26,,, | Performed by: SURGERY

## 2023-05-31 PROCEDURE — 25000003 PHARM REV CODE 250

## 2023-05-31 PROCEDURE — 36561 PR INSERT TUNNELED CV CATH WITH PORT: ICD-10-PCS | Mod: 58,RT,, | Performed by: SURGERY

## 2023-05-31 PROCEDURE — 37000008 HC ANESTHESIA 1ST 15 MINUTES: Performed by: SURGERY

## 2023-05-31 PROCEDURE — D9220A PRA ANESTHESIA: ICD-10-PCS | Mod: ANES,,, | Performed by: ANESTHESIOLOGY

## 2023-05-31 PROCEDURE — 82962 GLUCOSE BLOOD TEST: CPT

## 2023-05-31 PROCEDURE — 63600175 PHARM REV CODE 636 W HCPCS: Performed by: SURGERY

## 2023-05-31 PROCEDURE — 36561 INSERT TUNNELED CV CATH: CPT | Mod: 58,RT,, | Performed by: SURGERY

## 2023-05-31 PROCEDURE — 71000033 HC RECOVERY, INTIAL HOUR: Performed by: SURGERY

## 2023-05-31 PROCEDURE — 25000003 PHARM REV CODE 250: Performed by: NURSE ANESTHETIST, CERTIFIED REGISTERED

## 2023-05-31 PROCEDURE — P9016 RBC LEUKOCYTES REDUCED: HCPCS | Performed by: INTERNAL MEDICINE

## 2023-05-31 PROCEDURE — 25000003 PHARM REV CODE 250: Performed by: HOSPITALIST

## 2023-05-31 PROCEDURE — 93005 ELECTROCARDIOGRAM TRACING: CPT

## 2023-05-31 PROCEDURE — D9220A PRA ANESTHESIA: Mod: CRNA,,, | Performed by: NURSE ANESTHETIST, CERTIFIED REGISTERED

## 2023-05-31 DEVICE — CATH GLIDEPATH 14.5F 19CM 24CM: Type: IMPLANTABLE DEVICE | Site: CHEST | Status: FUNCTIONAL

## 2023-05-31 RX ORDER — MORPHINE SULFATE 10 MG/ML
4 INJECTION INTRAMUSCULAR; INTRAVENOUS; SUBCUTANEOUS EVERY 5 MIN PRN
Status: DISCONTINUED | OUTPATIENT
Start: 2023-05-31 | End: 2023-05-31 | Stop reason: HOSPADM

## 2023-05-31 RX ORDER — BUPIVACAINE HYDROCHLORIDE 2.5 MG/ML
INJECTION, SOLUTION EPIDURAL; INFILTRATION; INTRACAUDAL
Status: DISCONTINUED | OUTPATIENT
Start: 2023-05-31 | End: 2023-05-31 | Stop reason: HOSPADM

## 2023-05-31 RX ORDER — HEPARIN SODIUM 1000 [USP'U]/ML
4000 INJECTION, SOLUTION INTRAVENOUS; SUBCUTANEOUS
Status: DISCONTINUED | OUTPATIENT
Start: 2023-05-31 | End: 2023-06-06 | Stop reason: HOSPADM

## 2023-05-31 RX ORDER — OXYCODONE HYDROCHLORIDE 5 MG/1
5 TABLET ORAL
Status: DISCONTINUED | OUTPATIENT
Start: 2023-05-31 | End: 2023-05-31 | Stop reason: HOSPADM

## 2023-05-31 RX ORDER — MEPERIDINE HYDROCHLORIDE 25 MG/ML
25 INJECTION INTRAMUSCULAR; INTRAVENOUS; SUBCUTANEOUS EVERY 10 MIN PRN
Status: DISCONTINUED | OUTPATIENT
Start: 2023-05-31 | End: 2023-05-31 | Stop reason: HOSPADM

## 2023-05-31 RX ORDER — DIPHENHYDRAMINE HYDROCHLORIDE 50 MG/ML
25 INJECTION INTRAMUSCULAR; INTRAVENOUS EVERY 6 HOURS PRN
Status: DISCONTINUED | OUTPATIENT
Start: 2023-05-31 | End: 2023-05-31 | Stop reason: HOSPADM

## 2023-05-31 RX ORDER — MIDAZOLAM HYDROCHLORIDE 1 MG/ML
INJECTION INTRAMUSCULAR; INTRAVENOUS
Status: DISCONTINUED | OUTPATIENT
Start: 2023-05-31 | End: 2023-05-31

## 2023-05-31 RX ORDER — HEPARIN SODIUM 1000 [USP'U]/ML
INJECTION, SOLUTION INTRAVENOUS; SUBCUTANEOUS
Status: DISCONTINUED | OUTPATIENT
Start: 2023-05-31 | End: 2023-05-31 | Stop reason: HOSPADM

## 2023-05-31 RX ORDER — ONDANSETRON 2 MG/ML
4 INJECTION INTRAMUSCULAR; INTRAVENOUS DAILY PRN
Status: DISCONTINUED | OUTPATIENT
Start: 2023-05-31 | End: 2023-05-31 | Stop reason: HOSPADM

## 2023-05-31 RX ORDER — PROPOFOL 10 MG/ML
VIAL (ML) INTRAVENOUS
Status: DISCONTINUED | OUTPATIENT
Start: 2023-05-31 | End: 2023-05-31

## 2023-05-31 RX ORDER — LIDOCAINE HYDROCHLORIDE 20 MG/ML
INJECTION INTRAVENOUS
Status: DISCONTINUED | OUTPATIENT
Start: 2023-05-31 | End: 2023-05-31

## 2023-05-31 RX ORDER — CEFAZOLIN SODIUM 1 G/3ML
INJECTION, POWDER, FOR SOLUTION INTRAMUSCULAR; INTRAVENOUS
Status: DISCONTINUED | OUTPATIENT
Start: 2023-05-31 | End: 2023-05-31

## 2023-05-31 RX ORDER — MUPIROCIN 20 MG/G
OINTMENT TOPICAL 2 TIMES DAILY
Status: COMPLETED | OUTPATIENT
Start: 2023-05-31 | End: 2023-06-05

## 2023-05-31 RX ORDER — SODIUM CHLORIDE 9 MG/ML
INJECTION, SOLUTION INTRAVENOUS
Status: DISCONTINUED | OUTPATIENT
Start: 2023-05-31 | End: 2023-06-06 | Stop reason: HOSPADM

## 2023-05-31 RX ORDER — ETOMIDATE 2 MG/ML
INJECTION INTRAVENOUS
Status: DISCONTINUED | OUTPATIENT
Start: 2023-05-31 | End: 2023-05-31

## 2023-05-31 RX ORDER — HYDROMORPHONE HYDROCHLORIDE 2 MG/ML
0.5 INJECTION, SOLUTION INTRAMUSCULAR; INTRAVENOUS; SUBCUTANEOUS EVERY 5 MIN PRN
Status: DISCONTINUED | OUTPATIENT
Start: 2023-05-31 | End: 2023-05-31 | Stop reason: HOSPADM

## 2023-05-31 RX ORDER — FENTANYL CITRATE 50 UG/ML
INJECTION, SOLUTION INTRAMUSCULAR; INTRAVENOUS
Status: DISCONTINUED | OUTPATIENT
Start: 2023-05-31 | End: 2023-05-31

## 2023-05-31 RX ORDER — METOPROLOL TARTRATE 1 MG/ML
10 INJECTION, SOLUTION INTRAVENOUS ONCE
Status: COMPLETED | OUTPATIENT
Start: 2023-05-31 | End: 2023-05-31

## 2023-05-31 RX ORDER — SODIUM CHLORIDE, SODIUM LACTATE, POTASSIUM CHLORIDE, CALCIUM CHLORIDE 600; 310; 30; 20 MG/100ML; MG/100ML; MG/100ML; MG/100ML
125 INJECTION, SOLUTION INTRAVENOUS CONTINUOUS
Status: DISCONTINUED | OUTPATIENT
Start: 2023-05-31 | End: 2023-06-01

## 2023-05-31 RX ORDER — HYDROCODONE BITARTRATE AND ACETAMINOPHEN 500; 5 MG/1; MG/1
TABLET ORAL
Status: DISCONTINUED | OUTPATIENT
Start: 2023-05-31 | End: 2023-06-06 | Stop reason: HOSPADM

## 2023-05-31 RX ADMIN — SODIUM CHLORIDE: 9 INJECTION, SOLUTION INTRAVENOUS at 08:05

## 2023-05-31 RX ADMIN — ETOMIDATE 5 MG: 2 INJECTION, SOLUTION INTRAVENOUS at 01:05

## 2023-05-31 RX ADMIN — LIDOCAINE HYDROCHLORIDE 25 MG: 20 INJECTION, SOLUTION INTRAVENOUS at 01:05

## 2023-05-31 RX ADMIN — LIDOCAINE HYDROCHLORIDE 50 MG: 20 INJECTION, SOLUTION INTRAVENOUS at 01:05

## 2023-05-31 RX ADMIN — MUPIROCIN: 20 OINTMENT TOPICAL at 08:05

## 2023-05-31 RX ADMIN — SODIUM CHLORIDE: 9 INJECTION, SOLUTION INTRAVENOUS at 02:05

## 2023-05-31 RX ADMIN — ATORVASTATIN CALCIUM 80 MG: 80 TABLET, FILM COATED ORAL at 08:05

## 2023-05-31 RX ADMIN — ETOMIDATE 10 MG: 2 INJECTION, SOLUTION INTRAVENOUS at 01:05

## 2023-05-31 RX ADMIN — CEFAZOLIN 2 G: 1 INJECTION, POWDER, FOR SOLUTION INTRAMUSCULAR; INTRAVENOUS; PARENTERAL at 01:05

## 2023-05-31 RX ADMIN — MIDAZOLAM 1 MG: 1 INJECTION INTRAMUSCULAR; INTRAVENOUS at 01:05

## 2023-05-31 RX ADMIN — FENTANYL CITRATE 50 MCG: 50 INJECTION INTRAMUSCULAR; INTRAVENOUS at 01:05

## 2023-05-31 RX ADMIN — HEPARIN SODIUM 4000 UNITS: 1000 INJECTION, SOLUTION INTRAVENOUS; SUBCUTANEOUS at 05:05

## 2023-05-31 RX ADMIN — PROPOFOL 12.5 MG: 10 INJECTION, EMULSION INTRAVENOUS at 01:05

## 2023-05-31 RX ADMIN — METOPROLOL TARTRATE 10 MG: 1 INJECTION, SOLUTION INTRAVENOUS at 08:05

## 2023-05-31 RX ADMIN — SODIUM CHLORIDE: 9 INJECTION, SOLUTION INTRAVENOUS at 01:05

## 2023-05-31 RX ADMIN — PROPOFOL 25 MG: 10 INJECTION, EMULSION INTRAVENOUS at 01:05

## 2023-05-31 NOTE — ANESTHESIA POSTPROCEDURE EVALUATION
Anesthesia Post Evaluation    Patient: Pardeep Collins    Procedure(s) Performed: Procedure(s) (LRB):  INSERTION, CATHETER, HEMODIALYSIS, DUAL LUMEN (N/A)    Final Anesthesia Type: general      Patient location during evaluation: PACU  Patient participation: Yes- Able to Participate  Level of consciousness: awake and sedated  Post-procedure vital signs: reviewed and stable  Pain management: adequate  Airway patency: patent    PONV status at discharge: No PONV  Anesthetic complications: no      Cardiovascular status: blood pressure returned to baseline  Respiratory status: unassisted  Hydration status: euvolemic  Follow-up not needed.          Vitals Value Taken Time   /95 05/31/23 1425   Temp 36.8 °C (98.3 °F) 05/31/23 1356   Pulse 97 05/31/23 1426   Resp 16 05/31/23 1426   SpO2 95 % 05/31/23 1426   Vitals shown include unvalidated device data.      No case tracking events are documented in the log.      Pain/Jasmin Score: Jasmin Score: 9 (5/31/2023  2:20 PM)

## 2023-05-31 NOTE — TRANSFER OF CARE
"Anesthesia Transfer of Care Note    Patient: Pardeep Collins    Procedure(s) Performed: Procedure(s) (LRB):  INSERTION, CATHETER, HEMODIALYSIS, DUAL LUMEN (N/A)    Patient location: PACU    Anesthesia Type: MAC    Transport from OR: Transported from OR on 6-10 L/min O2 by face mask with adequate spontaneous ventilation    Post pain: adequate analgesia    Post assessment: no apparent anesthetic complications and tolerated procedure well    Post vital signs: stable    Level of consciousness: responds to stimulation    Nausea/Vomiting: no nausea/vomiting    Complications: none    Transfer of care protocol was followed      Last vitals:   Visit Vitals  /71 (BP Location: Right arm, Patient Position: Lying)   Pulse 100   Temp 36.8 °C (98.3 °F) (Oral)   Resp 20   Ht 5' 8" (1.727 m)   Wt 55.3 kg (122 lb)   SpO2 99%   BMI 18.55 kg/m²     "

## 2023-05-31 NOTE — PLAN OF CARE
Problem: Adult Inpatient Plan of Care  Goal: Plan of Care Review  Outcome: Ongoing, Progressing  Goal: Patient-Specific Goal (Individualized)  Outcome: Ongoing, Progressing  Goal: Absence of Hospital-Acquired Illness or Injury  Outcome: Ongoing, Progressing  Goal: Optimal Comfort and Wellbeing  Outcome: Ongoing, Progressing  Goal: Readiness for Transition of Care  Outcome: Ongoing, Progressing     Problem: Impaired Wound Healing  Goal: Optimal Wound Healing  Outcome: Ongoing, Progressing     Problem: Skin Injury Risk Increased  Goal: Skin Health and Integrity  Outcome: Ongoing, Progressing     Problem: Fall Injury Risk  Goal: Absence of Fall and Fall-Related Injury  Outcome: Ongoing, Progressing     Problem: Device-Related Complication Risk (Hemodialysis)  Goal: Safe, Effective Therapy Delivery  Outcome: Ongoing, Progressing     Problem: Hemodynamic Instability (Hemodialysis)  Goal: Effective Tissue Perfusion  Outcome: Ongoing, Progressing     Problem: Infection (Hemodialysis)  Goal: Absence of Infection Signs and Symptoms  Outcome: Ongoing, Progressing     Problem: Infection  Goal: Absence of Infection Signs and Symptoms  Outcome: Ongoing, Progressing

## 2023-05-31 NOTE — SUBJECTIVE & OBJECTIVE
Interval History:     NAEO  H/h is low, will give unit of blood  Gen surg is following  He is sinus tach , likely d/t acute blood loss, will do q6 h/h      Review of Systems   Unable to perform ROS: Patient nonverbal   Objective:     Vital Signs (Most Recent):  Temp: 98.3 °F (36.8 °C) (05/31/23 0456)  Pulse: (!) 121 (05/31/23 0456)  Resp: 16 (05/31/23 0456)  BP: 127/74 (05/31/23 0456)  SpO2: 97 % (05/31/23 0456) Vital Signs (24h Range):  Temp:  [97.8 °F (36.6 °C)-99.7 °F (37.6 °C)] 98.3 °F (36.8 °C)  Pulse:  [] 121  Resp:  [16-22] 16  SpO2:  [94 %-99 %] 97 %  BP: (109-169)/(70-82) 127/74     Weight: 55.3 kg (122 lb)  Body mass index is 18.55 kg/m².  No intake or output data in the 24 hours ending 05/31/23 0659        Physical Exam  Vitals reviewed.   Constitutional:       General: He is not in acute distress.     Appearance: He is not toxic-appearing.   HENT:      Head: Normocephalic and atraumatic.      Nose: Nose normal.      Mouth/Throat:      Mouth: Mucous membranes are moist.      Pharynx: Oropharynx is clear.   Eyes:      General: No scleral icterus.     Conjunctiva/sclera: Conjunctivae normal.      Pupils: Pupils are equal, round, and reactive to light.   Cardiovascular:      Rate and Rhythm: Normal rate and regular rhythm.   Pulmonary:      Effort: No respiratory distress.      Breath sounds: Normal breath sounds.   Abdominal:      General: Bowel sounds are normal.      Palpations: Abdomen is soft. There is no mass.   Musculoskeletal:         General: No swelling or tenderness.      Cervical back: Normal range of motion and neck supple.   Lymphadenopathy:      Cervical: No cervical adenopathy.   Skin:     General: Skin is warm and dry.      Findings: No erythema.   Neurological:      Mental Status: Mental status is at baseline.      Comments: Patient was nonverbal, he did follow commands, he has some weakness involving his right side.   Psychiatric:         Mood and Affect: Mood normal.          Behavior: Behavior normal.           Significant Labs: All pertinent labs within the past 24 hours have been reviewed.    Significant Imaging: I have reviewed all pertinent imaging results/findings within the past 24 hours.

## 2023-05-31 NOTE — ASSESSMENT & PLAN NOTE
Troponin elevation most consistent with type 2 MI, demand perfusion mismatch, no indication of ACS, previous echo shows normal LV systolic function, recommend continued medical tx, is a candidate for outpatient provacative testing with lexiscan cardiolyte stress imaging. Our office will arrange outpatient follow up and schedule stress imaging.

## 2023-05-31 NOTE — PLAN OF CARE
Problem: Adult Inpatient Plan of Care  Goal: Plan of Care Review  5/31/2023 1849 by Cruz Abel RN  Outcome: Ongoing, Progressing  5/31/2023 1750 by Cruz Abel RN  Outcome: Ongoing, Progressing  Goal: Patient-Specific Goal (Individualized)  5/31/2023 1849 by Cruz Abel RN  Outcome: Ongoing, Progressing  5/31/2023 1750 by Cruz Abel RN  Outcome: Ongoing, Progressing  Goal: Absence of Hospital-Acquired Illness or Injury  5/31/2023 1849 by Cruz Abel RN  Outcome: Ongoing, Progressing  5/31/2023 1750 by Cruz Abel RN  Outcome: Ongoing, Progressing  Goal: Optimal Comfort and Wellbeing  5/31/2023 1849 by Cruz Abel RN  Outcome: Ongoing, Progressing  5/31/2023 1750 by Cruz Abel RN  Outcome: Ongoing, Progressing  Goal: Readiness for Transition of Care  5/31/2023 1849 by Cruz Abel RN  Outcome: Ongoing, Progressing  5/31/2023 1750 by Cruz Abel RN  Outcome: Ongoing, Progressing     Problem: Impaired Wound Healing  Goal: Optimal Wound Healing  5/31/2023 1849 by Cruz Abel RN  Outcome: Ongoing, Progressing  5/31/2023 1750 by Cruz Abel RN  Outcome: Ongoing, Progressing     Problem: Skin Injury Risk Increased  Goal: Skin Health and Integrity  5/31/2023 1849 by Cruz Abel RN  Outcome: Ongoing, Progressing  5/31/2023 1750 by Cruz Abel RN  Outcome: Ongoing, Progressing     Problem: Fall Injury Risk  Goal: Absence of Fall and Fall-Related Injury  5/31/2023 1849 by Cruz Abel RN  Outcome: Ongoing, Progressing  5/31/2023 1750 by Cruz Abel RN  Outcome: Ongoing, Progressing     Problem: Device-Related Complication Risk (Hemodialysis)  Goal: Safe, Effective Therapy Delivery  5/31/2023 1849 by Cruz Abel RN  Outcome: Ongoing, Progressing  5/31/2023 1750 by Cruz Abel RN  Outcome: Ongoing, Progressing     Problem: Hemodynamic Instability (Hemodialysis)  Goal: Effective Tissue  Perfusion  5/31/2023 1849 by Cruz Abel RN  Outcome: Ongoing, Progressing  5/31/2023 1750 by Cruz Abel RN  Outcome: Ongoing, Progressing     Problem: Infection (Hemodialysis)  Goal: Absence of Infection Signs and Symptoms  5/31/2023 1849 by Cruz Abel RN  Outcome: Ongoing, Progressing  5/31/2023 1750 by Cruz Abel RN  Outcome: Ongoing, Progressing     Problem: Infection  Goal: Absence of Infection Signs and Symptoms  5/31/2023 1849 by Cruz Abel RN  Outcome: Ongoing, Progressing  5/31/2023 1750 by Cruz Abel RN  Outcome: Ongoing, Progressing

## 2023-05-31 NOTE — PROGRESS NOTES
Patient is awake alert he fixes and follows and nodded his head appropriately to interrogations.    Patient is seen on hemodialysis, they are tolerating this well, we will continue their treatment unchanged.    Physical exam general patient is chronically ill-appearing, he is in no acute distress    Assessment/plan 1.  ESRD-patient is status post tunneled catheter placement.  The catheter is functioning well presently.  2.  Hyperkalemia-patient's potassium was down to 5.2 this morning with Lokelma.  3. Hyperlipidemia    Vitals:    05/31/23 1430 05/31/23 1440 05/31/23 1445 05/31/23 1500   BP: (!) 142/74 (!) 149/73 (!) 148/81 (!) 145/85   BP Location:       Patient Position:       Pulse: 94 65 91 78   Resp: 16 16 16 16   Temp:  98.7 °F (37.1 °C)     TempSrc:       SpO2: 95%      Weight:       Height:

## 2023-05-31 NOTE — PROGRESS NOTES
[facility] - [address] [place] [address]  Adult Nutrition  Follow up note         Reason for Assessment  Reason For Assessment: RD follow-up   Nutrition Risk Screen: no indicators present     RD follow ups. Patient on a puree diet with ESRD and h/o stroke.   Recommend continue Nepro TID due to low body weight, ESRD and wounds. Recommend continue Asael to aide in wound healing.     Per MD notes:  HPI: Patient is an 82 year old male that was brought to [facility] via EMS for hemorrhage from AV fistula site. The patient has a PMHx of ESRD, HTN and CVA. The patient resides at [facility] and was reported to be bleeding from AV fistula site in right arm. It was reported that the patient had large amount of blood loss from the hemorrhage (photos in media). A pressure dressing was placed and the bleeding stopped. The patient has difficulty communicating verbally due to previous CVA but does nod in response to questions. The patient appears to deny any pain and does not appear in distress.  5/31:Interval History:      NAEO  H/h is low, will give unit of blood  Gen surg is following  He is sinus tach , likely d/t acute blood loss, will do q6 h/h         Malnutrition  Is Patient Malnourished: No  Skin Integrity  Randy Risk Assessment  Sensory Perception: 3-->slightly limited  Moisture: 4-->rarely moist  Activity: 1-->bedfast  Mobility: 2-->very limited  Nutrition: 3-->adequate  Friction and Shear: 1-->problem  Randy Score: 14  Comments on skin integrity: altered skin  Nutrition Diagnosis  Increased protein Needs   related to Wound healing as evidenced by wounds    Nutrition Diagnosis Status: Chronic/ continues        Nutrition Risk  Level of Risk/Frequency of Follow-up: high     Recent Labs   Lab 05/31/23  0323 05/31/23  0512   GLU 94  --    POCGLU  --  113*       Comments on Glucose: elevated with stress response  Nutrition Prescription / Recommendations  Recommendation/Intervention: Recommend  continue with puree diet. Recommend Nepro TID + Asael BID  Goals: weight maintenance, intake %, acceptance of supplements  Nutrition Goal Status: progressing towards goal  Current Diet Order: puree renal  Oral Nutrition Supplement: Nepro TID + Asael BID  Chewing or Swallowing Difficulty?: No Chewing or swallowing difficulty  Recommended Diet: Renal (High Protein)  puree  Recommended Oral Supplement: Nepro [420 kcals, 19g Protein, 38g Carbs(6g Fiber, 8g Sugar), 23g Fat] three times daily  Is Nutrition Support Recommended: No  Is Education Recommended: No  Monitor and Evaluation  % current Intake: Unknown/ No P.O. intake documented  % intake to meet estimated needs: 75 - 100 %  Food and Nutrient Intake: energy intake  Food and Nutrient Adminstration: diet order  Anthropometric Measurements: weight, weight change, body mass index  Biochemical Data, Medical Tests and Procedures: electrolyte and renal panel, lipid profile, gastrointestinal profile, glucose/endocrine profile, inflammatory profile  Nutrition-Focused Physical Findings: overall appearance, extremities, muscles and bones, head and eyes, skin     Current Medical Diagnosis and Past Medical History     Past Medical History:   Diagnosis Date    CVA (cerebral vascular accident)     Dialysis patient     Elevated PSA     Gout, unspecified     Hypertension     Renal disorder     Rheumatoid arthritis, unspecified      Nutrition/Diet History  Food Allergies: NKFA  Factors Affecting Nutritional Intake: None identified at this time  Lab/Procedures/Meds  Recent Labs   Lab 05/28/23  2140 05/29/23  0405 05/31/23  0323      < > 140   K 4.4   < > 5.2*   BUN 48*   < > 90*   CREATININE 6.56*   < > 9.85*   CALCIUM 8.4*   < > 9.0   ALBUMIN 1.6*  --   --       < > 103   ALT 18  --   --    AST 31  --   --     < > = values in this interval not displayed.     Note: BUN crea elevated due to ESRD  Last A1c: No results found for: HGBA1C  Lab Results   Component Value  "Date    RBC 2.07 (L) 05/31/2023    HGB 6.4 (L) 05/31/2023    HCT 19.5 (L) 05/31/2023    MCV 94.2 05/31/2023    MCH 30.9 05/31/2023    MCHC 32.8 05/31/2023    TIBC 171 (L) 05/12/2023     Pertinent Labs Reviewed: reviewed  Pertinent Labs Comments: Sodium: 135 (L)  Potassium: 4.9  Chloride: 98  CO2: 27  Anion Gap: 15  BUN: 55 (H)  Creatinine: 6.61 (H)  BUN/CREAT RATIO: 8  eGFR: 8 (L)  Glucose: 179 (H)  Calcium: 8.7      (L): Data is abnormally low  (H): Data is abnormally high  Pertinent Medications Reviewed: reviewed  Pertinent Medications Comments: atrovastatin  Anthropometrics  Temp: 98.7 °F (37.1 °C)  Height: 5' 8" (172.7 cm)  Height (inches): 68 in  Weight Method: Estimated  Weight: 55.3 kg (122 lb)  Weight (lb): 122 lb  Ideal Body Weight (IBW), Male: 154 lb     Estimated/Assessed Needs        Temp: 98.7 °F (37.1 °C)Oral  Weight Used For Calorie Calculations: 55.3 kg (121 lb 14.6 oz)   Energy Need Method: Kcal/kg Energy Calorie Requirements (kcal): 8692-8462  Weight Used For Protein Calculations: 55.3 kg (121 lb 14.6 oz)  Protein Requirements: 66-76  Estimated Fluid Requirement Method: RDA Method    RDA Method (mL): 1382     Nutrition by Nursing     Intake (%): 0% (pt refused)        Last Bowel Movement: 05/29/23              Nutrition Follow-Up  RD Follow-up?: Yes     "

## 2023-05-31 NOTE — PLAN OF CARE
Per  pt not ready for d/c at this time. Ss spoke with yariel with pt's ins and requested info faxed. Cherri with [facility] informed of pt not being ready for d/c at this time. Ss following.

## 2023-05-31 NOTE — INTERVAL H&P NOTE
The patient has been examined and the H&P has been reviewed:    I concur with the findings and no changes have occurred since H&P was written.    Surgery risks, benefits and alternative options discussed and understood by patient/family.    OR for tunneled hemodialysis catheter.  Unlikely able to declot fistula.        Active Hospital Problems    Diagnosis  POA    *Hemorrhage of arteriovenous fistula [T82.838A]  Yes    Elevated troponin [R77.8]  Yes    Complication of arteriovenous dialysis fistula [T82.9XXA]  Yes    Bleeding [R58]  Yes    Anemia [D64.9]  Yes    ESRD (end stage renal disease) [N18.6]  Yes    Primary hypertension [I10]  Yes    Type 2 MI (myocardial infarction) [I21.A1]  Yes      Resolved Hospital Problems   No resolved problems to display.

## 2023-05-31 NOTE — ANESTHESIA PREPROCEDURE EVALUATION
05/31/2023  Pardeep Collins is a 82 y.o., male.      Pre-op Assessment    I have reviewed the Patient Summary Reports.     I have reviewed the Nursing Notes. I have reviewed the NPO Status.   I have reviewed the Medications.     Review of Systems  Anesthesia Hx:  No problems with previous Anesthesia    Social:  Non-Smoker, No Alcohol Use    Hematology/Oncology:  Hematology Normal   Oncology Normal     EENT/Dental:EENT/Dental Normal   Cardiovascular:   Hypertension Past MI     Pulmonary:   Shortness of breath    Renal/:   Chronic Renal Disease, ESRD, Dialysis    Hepatic/GI:  Hepatic/GI Normal    Musculoskeletal:  Musculoskeletal Normal    Neurological:   CVA    Endocrine:  Endocrine Normal    Dermatological:  Skin Normal    Psych:  Psychiatric Normal           Physical Exam  General: Well nourished    Airway:  Mallampati: III / III  Mouth Opening: Normal  TM Distance: > 6 cm  Tongue: Normal  Neck ROM: Normal ROM    Chest/Lungs:  Clear to auscultation, Normal Respiratory Rate    Heart:  Rate: Normal  Rhythm: Regular Rhythm        Anesthesia Plan  Type of Anesthesia, risks & benefits discussed:    Anesthesia Type: Gen Supraglottic Airway  Intra-op Monitoring Plan: Standard ASA Monitors  Post Op Pain Control Plan: multimodal analgesia  Induction:  IV  Informed Consent: Informed consent signed with the Patient and all parties understand the risks and agree with anesthesia plan.  All questions answered. Patient consented to blood products? Yes  ASA Score: 4  Day of Surgery Review of History & Physical: H&P Update referred to the surgeon/provider.I have interviewed and examined the patient. I have reviewed the patient's H&P dated: There are no significant changes. H&P completed by Anesthesiologist.    Ready For Surgery From Anesthesia Perspective.     .

## 2023-05-31 NOTE — SUBJECTIVE & OBJECTIVE
Past Medical History:   Diagnosis Date    CVA (cerebral vascular accident)     Dialysis patient     Elevated PSA     Gout, unspecified     Hypertension     Renal disorder     Rheumatoid arthritis, unspecified        Past Surgical History:   Procedure Laterality Date    AV FISTULA PLACEMENT Right 3/20/2023    Procedure: CREATION, AV FISTULA;  Surgeon: Alfred Sierra MD;  Location: [facility];  Service: General;  Laterality: Right;  right basilic vein transposition    HAND SURGERY Left     urolift      in [place];       Review of patient's allergies indicates:  No Known Allergies    No current facility-administered medications on file prior to encounter.     Current Outpatient Medications on File Prior to Encounter   Medication Sig    aspirin (ECOTRIN) 81 MG EC tablet Take 1 tablet (81 mg total) by mouth once daily.    atorvastatin (LIPITOR) 80 MG tablet Take 1 tablet (80 mg total) by mouth every evening.    desmopressin (DDAVP) 0.2 MG tablet Take 1 tablet (200 mcg total) by mouth nightly.    furosemide (LASIX) 20 MG tablet Take 1 tablet (20 mg total) by mouth 2 (two) times daily.    HYDROcodone-acetaminophen (NORCO) 7.5-325 mg per tablet Take 1 tablet by mouth every 6 (six) hours as needed for Pain.    NIFEdipine (ADALAT CC) 30 MG TbSR Take 30 mg by mouth once daily.    polyethylene glycol (GLYCOLAX) 17 gram PwPk Take 17 g by mouth 2 (two) times daily as needed.     Family History       Problem Relation (Age of Onset)    Breast cancer Sister    Heart disease Father          Tobacco Use    Smoking status: Former     Types: Cigarettes     Quit date:      Years since quittin.4    Smokeless tobacco: Never   Substance and Sexual Activity    Alcohol use: Not Currently     Comment: quit in     Drug use: Never    Sexual activity: Not Currently     Review of Systems   Unable to perform ROS: Patient nonverbal   Objective:     Vital Signs (Most Recent):  Temp: 98.3 °F (36.8 °C) (23 0950)  Pulse: 109  (05/31/23 0950)  Resp: 16 (05/31/23 0950)  BP: 132/68 (05/31/23 0950)  SpO2: (!) 94 % (05/31/23 0950) Vital Signs (24h Range):  Temp:  [97.8 °F (36.6 °C)-99.9 °F (37.7 °C)] 98.3 °F (36.8 °C)  Pulse:  [] 109  Resp:  [16-22] 16  SpO2:  [90 %-99 %] 94 %  BP: (109-169)/(64-82) 132/68     Weight: 55.3 kg (122 lb)  Body mass index is 18.55 kg/m².    SpO2: (!) 94 %       No intake or output data in the 24 hours ending 05/31/23 1015    Lines/Drains/Airways       Drain  Duration                  Hemodialysis AV Fistula 03/20/23 Right upper arm 72 days              Peripheral Intravenous Line  Duration                  Peripheral IV - Single Lumen 05/28/23 2115 20 G Anterior;Left Forearm 2 days                     Physical Exam  Vitals and nursing note reviewed.   Constitutional:       Appearance: Normal appearance. He is normal weight.   HENT:      Head: Normocephalic and atraumatic.      Right Ear: External ear normal.      Left Ear: External ear normal.   Eyes:      General: No scleral icterus.        Right eye: No discharge.         Left eye: No discharge.      Extraocular Movements: Extraocular movements intact.      Conjunctiva/sclera: Conjunctivae normal.      Pupils: Pupils are equal, round, and reactive to light.   Neck:      Vascular: No carotid bruit.   Cardiovascular:      Rate and Rhythm: Normal rate and regular rhythm.      Pulses: Normal pulses.      Heart sounds: Murmur heard.     No friction rub. No gallop.   Pulmonary:      Effort: Pulmonary effort is normal.      Breath sounds: Normal breath sounds. No wheezing, rhonchi or rales.   Chest:      Chest wall: No tenderness.   Abdominal:      General: Abdomen is flat. Bowel sounds are normal. There is no distension.      Palpations: Abdomen is soft.      Tenderness: There is no abdominal tenderness. There is no guarding or rebound.   Musculoskeletal:         General: No swelling or tenderness.      Cervical back: Normal range of motion and neck supple.       Comments: Right arm edematous, no thrill at AV fistula site, no bruit.  Right sided upper and lower extremity weakness.    Skin:     General: Skin is warm and dry.      Findings: No erythema or rash.   Neurological:      Mental Status: He is alert.      Cranial Nerves: No cranial nerve deficit.      Motor: No weakness.      Comments: Pt able to answer in one word statements, does nod and shake head to questioning.          Significant Labs: BMP:   Recent Labs   Lab 05/30/23  0439 05/31/23  0323   * 94    140   K 5.6* 5.2*    103   CO2 26 25   BUN 75* 90*   CREATININE 8.20* 9.85*   CALCIUM 9.0 9.0       Significant Imaging: Echocardiogram: 2D echo with color flow doppler: No results found for this or any previous visit.    Echo    Result Date: 5/13/2023  · There is no evidence of intracardiac shunting.  · The left ventricle is normal in size with mild concentric hypertrophy   and low normal systolic function.  · The estimated ejection fraction is 50%.  · Indeterminate left ventricular diastolic function.  · Atrial fibrillation not observed.  · Normal right ventricular size.  · Moderate right atrial enlargement.  · Mild-to-moderate aortic regurgitation.  · Mild-to-moderate mitral regurgitation.  · Mild tricuspid regurgitation.  · Mild pulmonic regurgitation.         Echo    Result Date: 4/4/2023  · The left ventricle is normal in size with concentric remodeling and low   normal systolic function.  · The estimated ejection fraction is 50%.  · Indeterminate left ventricular diastolic function.  · Normal right ventricular size with normal right ventricular systolic   function.  · Mild aortic regurgitation.  · Moderate mitral regurgitation.  · Moderate tricuspid regurgitation.  · Normal central venous pressure (3 mmHg).  · The estimated PA systolic pressure is 67 mmHg.  · There are bilateral pleural effusions.

## 2023-05-31 NOTE — PROGRESS NOTES
[facility] - [address] [place] [address] Medicine  Progress Note    Patient Name: Pardeep Collins  MRN: [id number]  Patient Class: IP- Inpatient   Admission Date: 5/28/2023  Length of Stay: 2 days  Attending Physician: Rodrigo Alba MD  Primary Care Provider: Provider Notinsystem        Subjective:     Principal Problem:Hemorrhage of arteriovenous fistula        HPI:  Patient is an 82 year old male that was brought to [facility] via EMS for hemorrhage from AV fistula site. The patient has a PMHx of ESRD, HTN and CVA. The patient resides at [facility] and was reported to be bleeding from AV fistula site in right arm. It was reported that the patient had large amount of blood loss from the hemorrhage (photos in media). A pressure dressing was placed and the bleeding stopped. The patient has difficulty communicating verbally due to previous CVA but does nod in response to questions. The patient appears to deny any pain and does not appear in distress.    In ED, patient was noted to have /89 and this dropped to 70s systolic. In the ED the patient received 2L lactated ringers and 2 units pRBCs with initial hemoglobin of 6.5, improving to 8.8. The patient reported no chest pain, however troponin level was checked x2 with 31 and an increase to 229 seen.      Per chart review, the patient sees Dr. Aecves as his Nephrologist and had his AV fistula (Right arm) placed by Dr. Sierra on 3/20/23.  The patient has dialysis scheduled Tuesday, Thursday and Saturday. Last dialysis was Saturday 5/27/2023.     The patient will be admitted to [facility] for continued care and medical management.       Overview/Hospital Course:  No notes on file    Interval History:     NAEO  H/h is low, will give unit of blood  Gen surg is following  He is sinus tach , likely d/t acute blood loss, will do q6 h/h      Review of Systems   Unable to perform ROS: Patient nonverbal   Objective:     Vital Signs (Most  Recent):  Temp: 98.3 °F (36.8 °C) (05/31/23 0456)  Pulse: (!) 121 (05/31/23 0456)  Resp: 16 (05/31/23 0456)  BP: 127/74 (05/31/23 0456)  SpO2: 97 % (05/31/23 0456) Vital Signs (24h Range):  Temp:  [97.8 °F (36.6 °C)-99.7 °F (37.6 °C)] 98.3 °F (36.8 °C)  Pulse:  [] 121  Resp:  [16-22] 16  SpO2:  [94 %-99 %] 97 %  BP: (109-169)/(70-82) 127/74     Weight: 55.3 kg (122 lb)  Body mass index is 18.55 kg/m².  No intake or output data in the 24 hours ending 05/31/23 0659        Physical Exam  Vitals reviewed.   Constitutional:       General: He is not in acute distress.     Appearance: He is not toxic-appearing.   HENT:      Head: Normocephalic and atraumatic.      Nose: Nose normal.      Mouth/Throat:      Mouth: Mucous membranes are moist.      Pharynx: Oropharynx is clear.   Eyes:      General: No scleral icterus.     Conjunctiva/sclera: Conjunctivae normal.      Pupils: Pupils are equal, round, and reactive to light.   Cardiovascular:      Rate and Rhythm: Normal rate and regular rhythm.   Pulmonary:      Effort: No respiratory distress.      Breath sounds: Normal breath sounds.   Abdominal:      General: Bowel sounds are normal.      Palpations: Abdomen is soft. There is no mass.   Musculoskeletal:         General: No swelling or tenderness.      Cervical back: Normal range of motion and neck supple.   Lymphadenopathy:      Cervical: No cervical adenopathy.   Skin:     General: Skin is warm and dry.      Findings: No erythema.   Neurological:      Mental Status: Mental status is at baseline.      Comments: Patient was nonverbal, he did follow commands, he has some weakness involving his right side.   Psychiatric:         Mood and Affect: Mood normal.         Behavior: Behavior normal.           Significant Labs: All pertinent labs within the past 24 hours have been reviewed.    Significant Imaging: I have reviewed all pertinent imaging results/findings within the past 24 hours.      Assessment/Plan:      *  Hemorrhage of arteriovenous fistula  - R arm bleeding while at nursing home 5/28/2023  - Bleeding stopped in ED with pressure dressing  - Successful dialysis last reported on 5/27/2023  - Holding home aspirin  - General surgery consulted, appreciate assistance  - AV fistula (Right arm) placed by Dr. Sierra on 3/20/23.  - Initial H/H 6.5/21, after 2 units of pRBCs H/H 8.8/25.9    Anemia  - Initial hemoglobin in ED 6.5, after 2 units pRBCs hemoglobin 8.8  - Will continue to monitor      Type 2 MI (myocardial infarction)  - Likely 2/2 to acute blood loss anemia  - Troponin x3 - 31, 229, pending  - EKG - sinus tachycardia    5/30-cardiology recs pending.     Primary hypertension  - Patient hypotensive on admission  - Holding home medication      ESRD (end stage renal disease)  - Patient reportedly followed by Dr. Aceves  - Dialysis schedule Tuesday, Thursday, Saturday  - Nephrology consulted, appreciate assistance  - Last dialysis Saturday 5/27/2023        VTE Risk Mitigation (From admission, onward)         Ordered     Reason for No Pharmacological VTE Prophylaxis  Once        Question:  Reasons:  Answer:  Active Bleeding    05/29/23 0154     IP VTE HIGH RISK PATIENT  Once         05/29/23 0154     Place sequential compression device  Until discontinued         05/29/23 0154                Discharge Planning   RASHAD:      Code Status: Full Code   Is the patient medically ready for discharge?:     Reason for patient still in hospital (select all that apply): Treatment  Discharge Plan A: Return to nursing home                  Rehmat NATALI Alba MD  Department of Hospital Medicine   [facility] - [facility] [address] [facility] Telemetry

## 2023-05-31 NOTE — OR NURSING
1352-Pt rec'd to RR sedated, unresponsive to stimulation, on 6L O2 via fm, SaO2-99%, NADN, skin warm/dry to touch, resp even et unlabored, IV fluids infusing, transparent film intact to right chest wall tunneled HD dual lumen catheter, bilateral SCD hoses on, will con't to monitor, safety measures in effect.    1410-Pt responds to stimulation, placed on room air, SaO2-95%, NADN,no complaints voiced at this time, will con't to monitor.    1430-Pt resting quietly, NADN, transparent film remains intact to right chest wall HD catheter, pt will be going to dialysis then returning to room 559.    1440-Pt care released to Frank(RN) in dialysis, pt drowsy but responds to stimulation, vss temp 98.7 orally, resp-16, hr-65, b/p 149/73.

## 2023-05-31 NOTE — OP NOTE
[facility] - Periop Services  Surgery Department  Operative Note    SUMMARY     Date of Procedure: 5/31/2023     Procedure: Procedure(s) (LRB):  INSERTION, CATHETER, HEMODIALYSIS, DUAL LUMEN (N/A)     Surgeon(s) and Role:     * Alfred Sierra MD - Primary    Assisting Surgeon: None    Pre-Operative Diagnosis: ESRD (end stage renal disease) [N18.6]    Post-Operative Diagnosis: Post-Op Diagnosis Codes:     * ESRD (end stage renal disease) [N18.6]    Anesthesia: General/MAC    Procedures Performed: US guided fluoroscopy confirmed tunneled dialysis catheter placement    Significant Findings of the Procedure:     Procedure in Detail: After informed consent was obtained patient brought to the OR prepped and draped in the usual sterile fashion. We started out by using an ultrasound to scan the right IJ. We accessed the vein on the first attempt.  We then fluoroscopy to confirm in the SVC. We placed a separate incision for our infraclavicular catheter insertion with tunneling. We then using the Seldinger method dilated over the wire under fluoroscopic visualization. We then placed our catheter parking tip at the atriocaval junction. We flushed and aspirated good venous blood and the port and heparin locked it. We then secured the catheter in place. Patient tolerated the procedure well.    19 cm tunneled catheter used    Complications: No    Estimated Blood Loss (EBL): 5 CC           Implants:   Implant Name Type Inv. Item Serial No.  Lot No. LRB No. Used Action   CATH GLIDEPATH 14.5F 19CM 24CM - [id number] Dialysis Catheter CATH GLIDEPATH 14.5F 19CM 24CM  [employer] [id number] N/A 1 Implanted       Specimens:   Specimen (24h ago, onward)      None                    Condition: Good    Disposition: PACU - hemodynamically stable.    Attestation: I was present and scrubbed for the entire procedure.

## 2023-05-31 NOTE — CONSULTS
[facility] - [address] [place], [place] [address]etry  Cardiology  Consult Note    Patient Name: Pardeep Collins  MRN: [id number]  Admission Date: 5/28/2023  Hospital Length of Stay: 2 days  Code Status: Full Code   Attending Provider: Rodrigo Alba MD   Consulting Provider: Ozzy Noel DO  Primary Care Physician: Provider Not[employer]ystem  Principal Problem:Hemorrhage of arteriovenous fistula    Patient information was obtained from patient, past medical records and ER records.     Consults  Subjective:     Chief Complaint:  *bleeding right arm AV fistula site. **     HPI:   Pt is not a reliable historian due to expressive aphasia from previous stroke, hx obtained from medical record, confirmed with head nods.     Pt admitted thru ER with complaint of bleeding from AV fistula in right arm.  Pt found to have large amount of blood on bedding and around right arm in ECF, controlled with pressure dressing.  He was transferred to ER, found to be hypotensive, improved with fluid bolus and 2 U PRBCs.  He denies chest pain, pressure or shortness of breath. He denies previous cardiac history, stress or LHC, does recall previous echocardiogram.  He reports is comfortable, denies chest pain, pressure or shortness of breath at present.  He recently underwent construction of AV fistula in right forearm 3.23.      Past Medical History:   Diagnosis Date    CVA (cerebral vascular accident)     Dialysis patient     Elevated PSA     Gout, unspecified     Hypertension     Renal disorder     Rheumatoid arthritis, unspecified        Past Surgical History:   Procedure Laterality Date    AV FISTULA PLACEMENT Right 3/20/2023    Procedure: CREATION, AV FISTULA;  Surgeon: Alfred Sierra MD;  Location: [facility];  Service: General;  Laterality: Right;  right basilic vein transposition    HAND SURGERY Left     urolift      in [place];       Review of patient's allergies indicates:  No Known Allergies    No current facility-administered  medications on file prior to encounter.     Current Outpatient Medications on File Prior to Encounter   Medication Sig    aspirin (ECOTRIN) 81 MG EC tablet Take 1 tablet (81 mg total) by mouth once daily.    atorvastatin (LIPITOR) 80 MG tablet Take 1 tablet (80 mg total) by mouth every evening.    desmopressin (DDAVP) 0.2 MG tablet Take 1 tablet (200 mcg total) by mouth nightly.    furosemide (LASIX) 20 MG tablet Take 1 tablet (20 mg total) by mouth 2 (two) times daily.    HYDROcodone-acetaminophen (NORCO) 7.5-325 mg per tablet Take 1 tablet by mouth every 6 (six) hours as needed for Pain.    NIFEdipine (ADALAT CC) 30 MG TbSR Take 30 mg by mouth once daily.    polyethylene glycol (GLYCOLAX) 17 gram PwPk Take 17 g by mouth 2 (two) times daily as needed.     Family History       Problem Relation (Age of Onset)    Breast cancer Sister    Heart disease Father          Tobacco Use    Smoking status: Former     Types: Cigarettes     Quit date:      Years since quittin.4    Smokeless tobacco: Never   Substance and Sexual Activity    Alcohol use: Not Currently     Comment: quit in     Drug use: Never    Sexual activity: Not Currently     Review of Systems   Unable to perform ROS: Patient nonverbal   Objective:     Vital Signs (Most Recent):  Temp: 98.3 °F (36.8 °C) (23 0950)  Pulse: 109 (23 0950)  Resp: 16 (23 0950)  BP: 132/68 (23 0950)  SpO2: (!) 94 % (23 0950) Vital Signs (24h Range):  Temp:  [97.8 °F (36.6 °C)-99.9 °F (37.7 °C)] 98.3 °F (36.8 °C)  Pulse:  [] 109  Resp:  [16-22] 16  SpO2:  [90 %-99 %] 94 %  BP: (109-169)/(64-82) 132/68     Weight: 55.3 kg (122 lb)  Body mass index is 18.55 kg/m².    SpO2: (!) 94 %       No intake or output data in the 24 hours ending 23 1015    Lines/Drains/Airways       Drain  Duration                  Hemodialysis AV Fistula 23 Right upper arm 72 days              Peripheral Intravenous Line  Duration                   Peripheral IV - Single Lumen 05/28/23 2115 20 G Anterior;Left Forearm 2 days                     Physical Exam  Vitals and nursing note reviewed.   Constitutional:       Appearance: Normal appearance. He is normal weight.   HENT:      Head: Normocephalic and atraumatic.      Right Ear: External ear normal.      Left Ear: External ear normal.   Eyes:      General: No scleral icterus.        Right eye: No discharge.         Left eye: No discharge.      Extraocular Movements: Extraocular movements intact.      Conjunctiva/sclera: Conjunctivae normal.      Pupils: Pupils are equal, round, and reactive to light.   Neck:      Vascular: No carotid bruit.   Cardiovascular:      Rate and Rhythm: Normal rate and regular rhythm.      Pulses: Normal pulses.      Heart sounds: Murmur heard.     No friction rub. No gallop.   Pulmonary:      Effort: Pulmonary effort is normal.      Breath sounds: Normal breath sounds. No wheezing, rhonchi or rales.   Chest:      Chest wall: No tenderness.   Abdominal:      General: Abdomen is flat. Bowel sounds are normal. There is no distension.      Palpations: Abdomen is soft.      Tenderness: There is no abdominal tenderness. There is no guarding or rebound.   Musculoskeletal:         General: No swelling or tenderness.      Cervical back: Normal range of motion and neck supple.      Comments: Right arm edematous, no thrill at AV fistula site, no bruit.  Right sided upper and lower extremity weakness.    Skin:     General: Skin is warm and dry.      Findings: No erythema or rash.   Neurological:      Mental Status: He is alert.      Cranial Nerves: No cranial nerve deficit.      Motor: No weakness.      Comments: Pt able to answer in one word statements, does nod and shake head to questioning.          Significant Labs: BMP:   Recent Labs   Lab 05/30/23  0439 05/31/23  0323   * 94    140   K 5.6* 5.2*    103   CO2 26 25   BUN 75* 90*   CREATININE 8.20* 9.85*   CALCIUM  9.0 9.0       Significant Imaging: Echocardiogram: 2D echo with color flow doppler: No results found for this or any previous visit.    Echo    Result Date: 5/13/2023  · There is no evidence of intracardiac shunting.  · The left ventricle is normal in size with mild concentric hypertrophy   and low normal systolic function.  · The estimated ejection fraction is 50%.  · Indeterminate left ventricular diastolic function.  · Atrial fibrillation not observed.  · Normal right ventricular size.  · Moderate right atrial enlargement.  · Mild-to-moderate aortic regurgitation.  · Mild-to-moderate mitral regurgitation.  · Mild tricuspid regurgitation.  · Mild pulmonic regurgitation.         Echo    Result Date: 4/4/2023  · The left ventricle is normal in size with concentric remodeling and low   normal systolic function.  · The estimated ejection fraction is 50%.  · Indeterminate left ventricular diastolic function.  · Normal right ventricular size with normal right ventricular systolic   function.  · Mild aortic regurgitation.  · Moderate mitral regurgitation.  · Moderate tricuspid regurgitation.  · Normal central venous pressure (3 mmHg).  · The estimated PA systolic pressure is 67 mmHg.  · There are bilateral pleural effusions.           Assessment and Plan:     * Hemorrhage of arteriovenous fistula  Bleeding controlled, AV fistula appears non-functional, awaiting surgical consultation.    Flaccid hemiplegia affecting right dominant side  Post stoke, no change, continue supportive care.     Anemia  Acute blood loss anemia due to bleeding at right arm AV fistula.     Type 2 MI (myocardial infarction)  Troponin elevation most consistent with type 2 MI, demand perfusion mismatch, no indication of ACS, previous echo shows normal LV systolic function, recommend continued medical tx, is a candidate for outpatient provacative testing with lexiscan cardiolyte stress imaging. Our office will arrange outpatient follow up and schedule  stress imaging.     Primary hypertension  Adequate control on current meds    ESRD (end stage renal disease)  Following with nephrology for ESRD, dialysis dependent.         VTE Risk Mitigation (From admission, onward)         Ordered     Reason for No Pharmacological VTE Prophylaxis  Once        Question:  Reasons:  Answer:  Active Bleeding    05/29/23 0154     IP VTE HIGH RISK PATIENT  Once         05/29/23 0154     Place sequential compression device  Until discontinued         05/29/23 0154                Thank you for your consult. I will follow-up with patient. Please contact us if you have any additional questions.    Ozzy Noel, DO  Cardiology   [facility] - [address] [place] [address]

## 2023-05-31 NOTE — HPI
Pt is not a reliable historian due to expressive aphasia from previous stroke, hx obtained from medical record, confirmed with head nods.     Pt admitted thru ER with complaint of bleeding from AV fistula in right arm.  Pt found to have large amount of blood on bedding and around right arm in ECF, controlled with pressure dressing.  He was transferred to ER, found to be hypotensive, improved with fluid bolus and 2 U PRBCs.  He denies chest pain, pressure or shortness of breath. He denies previous cardiac history, stress or LHC, does recall previous echocardiogram.  He reports is comfortable, denies chest pain, pressure or shortness of breath at present.  He recently underwent construction of AV fistula in right forearm 3.23.

## 2023-06-01 LAB
ANION GAP SERPL CALCULATED.3IONS-SCNC: 16 MMOL/L (ref 7–16)
BASOPHILS # BLD AUTO: 0.02 K/UL (ref 0–0.2)
BASOPHILS NFR BLD AUTO: 0.2 % (ref 0–1)
BUN SERPL-MCNC: 50 MG/DL (ref 7–18)
BUN/CREAT SERPL: 8 (ref 6–20)
CALCIUM SERPL-MCNC: 9 MG/DL (ref 8.5–10.1)
CHLORIDE SERPL-SCNC: 102 MMOL/L (ref 98–107)
CO2 SERPL-SCNC: 26 MMOL/L (ref 21–32)
CREAT SERPL-MCNC: 6.06 MG/DL (ref 0.7–1.3)
DIFFERENTIAL METHOD BLD: ABNORMAL
EGFR (NO RACE VARIABLE) (RUSH/TITUS): 9 ML/MIN/1.73M2
EOSINOPHIL # BLD AUTO: 0 K/UL (ref 0–0.5)
EOSINOPHIL NFR BLD AUTO: 0 % (ref 1–4)
ERYTHROCYTE [DISTWIDTH] IN BLOOD BY AUTOMATED COUNT: 15 % (ref 11.5–14.5)
GLUCOSE SERPL-MCNC: 111 MG/DL (ref 70–105)
GLUCOSE SERPL-MCNC: 122 MG/DL (ref 70–105)
GLUCOSE SERPL-MCNC: 140 MG/DL (ref 70–105)
GLUCOSE SERPL-MCNC: 141 MG/DL (ref 70–105)
GLUCOSE SERPL-MCNC: 92 MG/DL (ref 74–106)
HCT VFR BLD AUTO: 25.1 % (ref 40–54)
HGB BLD-MCNC: 8.4 G/DL (ref 13.5–18)
IMM GRANULOCYTES # BLD AUTO: 0.03 K/UL (ref 0–0.04)
IMM GRANULOCYTES NFR BLD: 0.4 % (ref 0–0.4)
LYMPHOCYTES # BLD AUTO: 0.75 K/UL (ref 1–4.8)
LYMPHOCYTES NFR BLD AUTO: 9.2 % (ref 27–41)
MCH RBC QN AUTO: 30.7 PG (ref 27–31)
MCHC RBC AUTO-ENTMCNC: 33.5 G/DL (ref 32–36)
MCV RBC AUTO: 91.6 FL (ref 80–96)
MONOCYTES # BLD AUTO: 0.62 K/UL (ref 0–0.8)
MONOCYTES NFR BLD AUTO: 7.6 % (ref 2–6)
MPC BLD CALC-MCNC: 12.8 FL (ref 9.4–12.4)
NEUTROPHILS # BLD AUTO: 6.76 K/UL (ref 1.8–7.7)
NEUTROPHILS NFR BLD AUTO: 82.6 % (ref 53–65)
NRBC # BLD AUTO: 0 X10E3/UL
NRBC, AUTO (.00): 0 %
PLATELET # BLD AUTO: 133 K/UL (ref 150–400)
POTASSIUM SERPL-SCNC: 4.9 MMOL/L (ref 3.5–5.1)
RBC # BLD AUTO: 2.74 M/UL (ref 4.6–6.2)
SODIUM SERPL-SCNC: 139 MMOL/L (ref 136–145)
WBC # BLD AUTO: 8.18 K/UL (ref 4.5–11)

## 2023-06-01 PROCEDURE — 11000001 HC ACUTE MED/SURG PRIVATE ROOM

## 2023-06-01 PROCEDURE — 25000003 PHARM REV CODE 250: Performed by: INTERNAL MEDICINE

## 2023-06-01 PROCEDURE — 99232 PR SUBSEQUENT HOSPITAL CARE,LEVL II: ICD-10-PCS | Mod: ,,, | Performed by: INTERNAL MEDICINE

## 2023-06-01 PROCEDURE — 80048 BASIC METABOLIC PNL TOTAL CA: CPT

## 2023-06-01 PROCEDURE — 85025 COMPLETE CBC W/AUTO DIFF WBC: CPT

## 2023-06-01 PROCEDURE — 63600175 PHARM REV CODE 636 W HCPCS: Performed by: INTERNAL MEDICINE

## 2023-06-01 PROCEDURE — 27000221 HC OXYGEN, UP TO 24 HOURS

## 2023-06-01 PROCEDURE — 25000003 PHARM REV CODE 250

## 2023-06-01 PROCEDURE — 99232 SBSQ HOSP IP/OBS MODERATE 35: CPT | Mod: ,,, | Performed by: INTERNAL MEDICINE

## 2023-06-01 PROCEDURE — 99900035 HC TECH TIME PER 15 MIN (STAT)

## 2023-06-01 PROCEDURE — 82962 GLUCOSE BLOOD TEST: CPT

## 2023-06-01 RX ORDER — REGADENOSON 0.08 MG/ML
0.4 INJECTION, SOLUTION INTRAVENOUS ONCE
Status: COMPLETED | OUTPATIENT
Start: 2023-06-01 | End: 2023-06-01

## 2023-06-01 RX ADMIN — MUPIROCIN: 20 OINTMENT TOPICAL at 09:06

## 2023-06-01 RX ADMIN — ATORVASTATIN CALCIUM 80 MG: 80 TABLET, FILM COATED ORAL at 09:06

## 2023-06-01 RX ADMIN — MUPIROCIN: 20 OINTMENT TOPICAL at 08:06

## 2023-06-01 RX ADMIN — REGADENOSON 0.4 MG: 0.08 INJECTION, SOLUTION INTRAVENOUS at 12:06

## 2023-06-01 NOTE — PROGRESS NOTES
HD cath site looks good without bleeding, drainage, or hematoma.  Can follow-up with Dr. Sierra in clinic to plan for AV graft.  General surgery will sign off.

## 2023-06-01 NOTE — PROGRESS NOTES
Patient is awake alert he fixes and follows and nodded his head appropriately to interrogations.    Physical exam general patient is chronically ill-appearing, he is in no acute distress    Assessment/plan 1.  ESRD-patient is status post tunneled catheter placement.  Continue HD support  2.  Hyperkalemia-patient's potassium is 4.9 this morning.  3. Hyperlipidemia    Patient is okay for discharge from my standpoint, follow-up for permanent access had General surgery's discretion.      Vitals:    06/01/23 0036 06/01/23 0402 06/01/23 0700 06/01/23 0705   BP: 138/67 128/69 128/71 128/71   Pulse: 105 97 95 95   Resp: 16 18 18 18   Temp: 98.1 °F (36.7 °C) 98.3 °F (36.8 °C) 97.9 °F (36.6 °C) 97.9 °F (36.6 °C)   TempSrc:       SpO2: 99% 99% 99% 99%   Weight:       Height:

## 2023-06-01 NOTE — SUBJECTIVE & OBJECTIVE
Interval History:     NAEO  H/h stable  No new concerns.       Review of Systems   Unable to perform ROS: Patient nonverbal   Objective:     Vital Signs (Most Recent):  Temp: 97.9 °F (36.6 °C) (06/01/23 0705)  Pulse: 95 (06/01/23 0705)  Resp: 18 (06/01/23 0705)  BP: 128/71 (06/01/23 0705)  SpO2: 99 % (06/01/23 0705) Vital Signs (24h Range):  Temp:  [97.9 °F (36.6 °C)-98.9 °F (37.2 °C)] 97.9 °F (36.6 °C)  Pulse:  [] 95  Resp:  [16-23] 18  SpO2:  [94 %-99 %] 99 %  BP: ()/(45-95) 128/71     Weight: 55.3 kg (122 lb)  Body mass index is 18.55 kg/m².    Intake/Output Summary (Last 24 hours) at 6/1/2023 1027  Last data filed at 5/31/2023 1750  Gross per 24 hour   Intake 50 ml   Output 1535 ml   Net -1485 ml           Physical Exam  Vitals reviewed.   Constitutional:       General: He is not in acute distress.     Appearance: He is not toxic-appearing.   HENT:      Head: Normocephalic and atraumatic.      Nose: Nose normal.      Mouth/Throat:      Mouth: Mucous membranes are moist.      Pharynx: Oropharynx is clear.   Eyes:      General: No scleral icterus.     Conjunctiva/sclera: Conjunctivae normal.      Pupils: Pupils are equal, round, and reactive to light.   Cardiovascular:      Rate and Rhythm: Normal rate and regular rhythm.   Pulmonary:      Effort: No respiratory distress.      Breath sounds: Normal breath sounds.   Abdominal:      General: Bowel sounds are normal.      Palpations: Abdomen is soft. There is no mass.   Musculoskeletal:         General: No swelling or tenderness.      Cervical back: Normal range of motion and neck supple.   Lymphadenopathy:      Cervical: No cervical adenopathy.   Skin:     General: Skin is warm and dry.      Findings: No erythema.   Neurological:      Mental Status: Mental status is at baseline.      Comments: Patient was nonverbal, he did follow commands, he has some weakness involving his right side.   Psychiatric:         Mood and Affect: Mood normal.          Behavior: Behavior normal.           Significant Labs: All pertinent labs within the past 24 hours have been reviewed.    Significant Imaging: I have reviewed all pertinent imaging results/findings within the past 24 hours.

## 2023-06-01 NOTE — PROGRESS NOTES
[facility] - [address] [place] [address] Medicine  Progress Note    Patient Name: Pardeep Collins  MRN: [id number]  Patient Class: IP- Inpatient   Admission Date: 5/28/2023  Length of Stay: 3 days  Attending Physician: Rodrigo Alba MD  Primary Care Provider: Provider [employer]        Subjective:     Principal Problem:Hemorrhage of arteriovenous fistula        HPI:  Patient is an 82 year old male that was brought to [facility] via EMS for hemorrhage from AV fistula site. The patient has a PMHx of ESRD, HTN and CVA. The patient resides at [facility] and was reported to be bleeding from AV fistula site in right arm. It was reported that the patient had large amount of blood loss from the hemorrhage (photos in media). A pressure dressing was placed and the bleeding stopped. The patient has difficulty communicating verbally due to previous CVA but does nod in response to questions. The patient appears to deny any pain and does not appear in distress.    In ED, patient was noted to have /89 and this dropped to 70s systolic. In the ED the patient received 2L lactated ringers and 2 units pRBCs with initial hemoglobin of 6.5, improving to 8.8. The patient reported no chest pain, however troponin level was checked x2 with 31 and an increase to 229 seen.      Per chart review, the patient sees Dr. Aceves as his Nephrologist and had his AV fistula (Right arm) placed by Dr. Sierra on 3/20/23.  The patient has dialysis scheduled Tuesday, Thursday and Saturday. Last dialysis was Saturday 5/27/2023.     The patient will be admitted to [facility] for continued care and medical management.       Overview/Hospital Course:  No notes on file    Interval History:     NAEO  H/h stable  No new concerns.       Review of Systems   Unable to perform ROS: Patient nonverbal   Objective:     Vital Signs (Most Recent):  Temp: 97.9 °F (36.6 °C) (06/01/23 0705)  Pulse: 95 (06/01/23 0705)  Resp: 18 (06/01/23  0705)  BP: 128/71 (06/01/23 0705)  SpO2: 99 % (06/01/23 0705) Vital Signs (24h Range):  Temp:  [97.9 °F (36.6 °C)-98.9 °F (37.2 °C)] 97.9 °F (36.6 °C)  Pulse:  [] 95  Resp:  [16-23] 18  SpO2:  [94 %-99 %] 99 %  BP: ()/(45-95) 128/71     Weight: 55.3 kg (122 lb)  Body mass index is 18.55 kg/m².    Intake/Output Summary (Last 24 hours) at 6/1/2023 1027  Last data filed at 5/31/2023 1750  Gross per 24 hour   Intake 50 ml   Output 1535 ml   Net -1485 ml           Physical Exam  Vitals reviewed.   Constitutional:       General: He is not in acute distress.     Appearance: He is not toxic-appearing.   HENT:      Head: Normocephalic and atraumatic.      Nose: Nose normal.      Mouth/Throat:      Mouth: Mucous membranes are moist.      Pharynx: Oropharynx is clear.   Eyes:      General: No scleral icterus.     Conjunctiva/sclera: Conjunctivae normal.      Pupils: Pupils are equal, round, and reactive to light.   Cardiovascular:      Rate and Rhythm: Normal rate and regular rhythm.   Pulmonary:      Effort: No respiratory distress.      Breath sounds: Normal breath sounds.   Abdominal:      General: Bowel sounds are normal.      Palpations: Abdomen is soft. There is no mass.   Musculoskeletal:         General: No swelling or tenderness.      Cervical back: Normal range of motion and neck supple.   Lymphadenopathy:      Cervical: No cervical adenopathy.   Skin:     General: Skin is warm and dry.      Findings: No erythema.   Neurological:      Mental Status: Mental status is at baseline.      Comments: Patient was nonverbal, he did follow commands, he has some weakness involving his right side.   Psychiatric:         Mood and Affect: Mood normal.         Behavior: Behavior normal.           Significant Labs: All pertinent labs within the past 24 hours have been reviewed.    Significant Imaging: I have reviewed all pertinent imaging results/findings within the past 24 hours.      Assessment/Plan:      * Hemorrhage  of arteriovenous fistula  - R arm bleeding while at nursing home 5/28/2023  - Bleeding stopped in ED with pressure dressing  - Successful dialysis last reported on 5/27/2023  - Holding home aspirin  - General surgery consulted, appreciate assistance  - AV fistula (Right arm) placed by Dr. Sierra on 3/20/23.  - Initial H/H 6.5/21, after 2 units of pRBCs H/H 8.8/25.9    Anemia  - Initial hemoglobin in ED 6.5, after 2 units pRBCs hemoglobin 8.8  - Will continue to monitor      Type 2 MI (myocardial infarction)  - Likely 2/2 to acute blood loss anemia  - Troponin x3 - 31, 229, pending  - EKG - sinus tachycardia    5/30-cardiology recs pending.     Primary hypertension  - Patient hypotensive on admission  - Holding home medication      ESRD (end stage renal disease)  - Patient reportedly followed by Dr. Aceves  - Dialysis schedule Tuesday, Thursday, Saturday  - Nephrology consulted, appreciate assistance  - Last dialysis Saturday 5/27/2023        VTE Risk Mitigation (From admission, onward)         Ordered     heparin (porcine) injection 4,000 Units  As needed (PRN)         05/31/23 1340     Reason for No Pharmacological VTE Prophylaxis  Once        Question:  Reasons:  Answer:  Active Bleeding    05/29/23 0154     IP VTE HIGH RISK PATIENT  Once         05/29/23 0154     Place sequential compression device  Until discontinued         05/29/23 0154                Discharge Planning   RASHAD:      Code Status: Full Code   Is the patient medically ready for discharge?:     Reason for patient still in hospital (select all that apply): Treatment  Discharge Plan A: Return to nursing home                  Rehmat NATALI Alba MD  Department of Hospital Medicine   [facility] - [facility] Tele[facility]

## 2023-06-01 NOTE — PLAN OF CARE
Pt not ready for dc today, updates faxed to jorge at [facility], following

## 2023-06-02 LAB
ANION GAP SERPL CALCULATED.3IONS-SCNC: 18 MMOL/L (ref 7–16)
BASOPHILS # BLD AUTO: 0.02 K/UL (ref 0–0.2)
BASOPHILS NFR BLD AUTO: 0.2 % (ref 0–1)
BUN SERPL-MCNC: 74 MG/DL (ref 7–18)
BUN/CREAT SERPL: 9 (ref 6–20)
CALCIUM SERPL-MCNC: 9.4 MG/DL (ref 8.5–10.1)
CHLORIDE SERPL-SCNC: 102 MMOL/L (ref 98–107)
CO2 SERPL-SCNC: 25 MMOL/L (ref 21–32)
CREAT SERPL-MCNC: 7.97 MG/DL (ref 0.7–1.3)
CV STRESS BASE HR: 100 BPM
DIASTOLIC BLOOD PRESSURE: 63 MMHG
DIFFERENTIAL METHOD BLD: ABNORMAL
EGFR (NO RACE VARIABLE) (RUSH/TITUS): 6 ML/MIN/1.73M2
EOSINOPHIL # BLD AUTO: 0.06 K/UL (ref 0–0.5)
EOSINOPHIL NFR BLD AUTO: 0.7 % (ref 1–4)
ERYTHROCYTE [DISTWIDTH] IN BLOOD BY AUTOMATED COUNT: 14.7 % (ref 11.5–14.5)
GLUCOSE SERPL-MCNC: 102 MG/DL (ref 70–105)
GLUCOSE SERPL-MCNC: 106 MG/DL (ref 74–106)
GLUCOSE SERPL-MCNC: 121 MG/DL (ref 70–105)
GLUCOSE SERPL-MCNC: 132 MG/DL (ref 70–105)
HCT VFR BLD AUTO: 25.4 % (ref 40–54)
HGB BLD-MCNC: 8.2 G/DL (ref 13.5–18)
IMM GRANULOCYTES # BLD AUTO: 0.04 K/UL (ref 0–0.04)
IMM GRANULOCYTES NFR BLD: 0.5 % (ref 0–0.4)
LYMPHOCYTES # BLD AUTO: 0.84 K/UL (ref 1–4.8)
LYMPHOCYTES NFR BLD AUTO: 9.7 % (ref 27–41)
MCH RBC QN AUTO: 30 PG (ref 27–31)
MCHC RBC AUTO-ENTMCNC: 32.3 G/DL (ref 32–36)
MCV RBC AUTO: 93 FL (ref 80–96)
MONOCYTES # BLD AUTO: 0.61 K/UL (ref 0–0.8)
MONOCYTES NFR BLD AUTO: 7 % (ref 2–6)
MPC BLD CALC-MCNC: 12.6 FL (ref 9.4–12.4)
NEUTROPHILS # BLD AUTO: 7.12 K/UL (ref 1.8–7.7)
NEUTROPHILS NFR BLD AUTO: 81.9 % (ref 53–65)
NRBC # BLD AUTO: 0 X10E3/UL
NRBC, AUTO (.00): 0 %
OHS CV CPX 85 PERCENT MAX PREDICTED HEART RATE MALE: 119
OHS CV CPX MAX PREDICTED HEART RATE: 140
OHS CV CPX PATIENT IS FEMALE: 0
OHS CV CPX PATIENT IS MALE: 1
OHS CV CPX PEAK DIASTOLIC BLOOD PRESSURE: 63 MMHG
OHS CV CPX PEAK HEAR RATE: 113 BPM
OHS CV CPX PEAK RATE PRESSURE PRODUCT: NORMAL
OHS CV CPX PEAK SYSTOLIC BLOOD PRESSURE: 160 MMHG
OHS CV CPX PERCENT MAX PREDICTED HEART RATE ACHIEVED: 81
OHS CV CPX RATE PRESSURE PRODUCT PRESENTING: NORMAL
PLATELET # BLD AUTO: 159 K/UL (ref 150–400)
POTASSIUM SERPL-SCNC: 4.4 MMOL/L (ref 3.5–5.1)
RBC # BLD AUTO: 2.73 M/UL (ref 4.6–6.2)
SODIUM SERPL-SCNC: 141 MMOL/L (ref 136–145)
SYSTOLIC BLOOD PRESSURE: 160 MMHG
WBC # BLD AUTO: 8.69 K/UL (ref 4.5–11)

## 2023-06-02 PROCEDURE — 82962 GLUCOSE BLOOD TEST: CPT

## 2023-06-02 PROCEDURE — 11000001 HC ACUTE MED/SURG PRIVATE ROOM

## 2023-06-02 PROCEDURE — 80048 BASIC METABOLIC PNL TOTAL CA: CPT

## 2023-06-02 PROCEDURE — 25000003 PHARM REV CODE 250: Performed by: INTERNAL MEDICINE

## 2023-06-02 PROCEDURE — 99231 SBSQ HOSP IP/OBS SF/LOW 25: CPT | Mod: ,,, | Performed by: INTERNAL MEDICINE

## 2023-06-02 PROCEDURE — 99231 PR SUBSEQUENT HOSPITAL CARE,LEVL I: ICD-10-PCS | Mod: ,,, | Performed by: INTERNAL MEDICINE

## 2023-06-02 PROCEDURE — 25000003 PHARM REV CODE 250

## 2023-06-02 PROCEDURE — 99233 SBSQ HOSP IP/OBS HIGH 50: CPT | Mod: ,,, | Performed by: REGISTERED NURSE

## 2023-06-02 PROCEDURE — 99233 PR SUBSEQUENT HOSPITAL CARE,LEVL III: ICD-10-PCS | Mod: ,,, | Performed by: REGISTERED NURSE

## 2023-06-02 PROCEDURE — 63600175 PHARM REV CODE 636 W HCPCS: Performed by: INTERNAL MEDICINE

## 2023-06-02 PROCEDURE — 90935 HEMODIALYSIS ONE EVALUATION: CPT

## 2023-06-02 PROCEDURE — 85025 COMPLETE CBC W/AUTO DIFF WBC: CPT

## 2023-06-02 RX ADMIN — ATORVASTATIN CALCIUM 80 MG: 80 TABLET, FILM COATED ORAL at 08:06

## 2023-06-02 RX ADMIN — HEPARIN SODIUM 4000 UNITS: 1000 INJECTION, SOLUTION INTRAVENOUS; SUBCUTANEOUS at 06:06

## 2023-06-02 RX ADMIN — MUPIROCIN: 20 OINTMENT TOPICAL at 08:06

## 2023-06-02 RX ADMIN — SODIUM CHLORIDE 2000 ML: 9 INJECTION, SOLUTION INTRAVENOUS at 06:06

## 2023-06-02 RX ADMIN — MUPIROCIN: 20 OINTMENT TOPICAL at 10:06

## 2023-06-02 NOTE — PROGRESS NOTES
[facility] - [facility]  Cardiology  Progress Note    Patient Name: Pardeep Collins  MRN: [id number]  Admission Date: 5/28/2023  Hospital Length of Stay: 4 days  Code Status: Full Code   Attending Physician: Rodrigo Alba MD   Primary Care Physician: Provider [employer]  Expected Discharge Date:   Principal Problem:Hemorrhage of arteriovenous fistula    Subjective:     Hospital Course:   Pt reports bleeding has resovled, no new complaints.       Interval History: NAEO. Shakes head no when ask about chest pain or sob    Review of Systems   Unable to perform ROS: Patient nonverbal   Objective:     Vital Signs (Most Recent):  Temp: 98 °F (36.7 °C) (06/02/23 1500)  Pulse: 73 (06/02/23 1700)  Resp: 18 (06/02/23 1700)  BP: (!) 98/52 (06/02/23 1700)  SpO2: 96 % (06/02/23 1100) Vital Signs (24h Range):  Temp:  [98 °F (36.7 °C)-98.6 °F (37 °C)] 98 °F (36.7 °C)  Pulse:  [54-92] 73  Resp:  [16-18] 18  SpO2:  [96 %-98 %] 96 %  BP: ()/(48-85) 98/52     Weight: 55.3 kg (122 lb)  Body mass index is 18.55 kg/m².     SpO2: 96 %       No intake or output data in the 24 hours ending 06/02/23 1841    Lines/Drains/Airways       Central Venous Catheter Line  Duration                  Hemodialysis Catheter 05/31/23 1400 right subclavian 2 days              Drain  Duration                  Hemodialysis AV Fistula 03/20/23 Right upper arm 74 days              Peripheral Intravenous Line  Duration                  Peripheral IV - Single Lumen 05/28/23 2115 20 G Anterior;Left Forearm 4 days                       Physical Exam  Vitals and nursing note reviewed.   Constitutional:       Appearance: Normal appearance. He is normal weight.   HENT:      Head: Normocephalic and atraumatic.      Right Ear: External ear normal.      Left Ear: External ear normal.   Eyes:      General: No scleral icterus.        Right eye: No discharge.         Left eye: No discharge.      Extraocular Movements: Extraocular movements  intact.      Conjunctiva/sclera: Conjunctivae normal.      Pupils: Pupils are equal, round, and reactive to light.   Neck:      Vascular: No carotid bruit.   Cardiovascular:      Rate and Rhythm: Normal rate and regular rhythm.      Pulses: Normal pulses.      Heart sounds: Murmur heard.     No friction rub. No gallop.   Pulmonary:      Effort: Pulmonary effort is normal.      Breath sounds: Normal breath sounds. No wheezing, rhonchi or rales.   Chest:      Chest wall: No tenderness.   Abdominal:      General: Abdomen is flat. Bowel sounds are normal. There is no distension.      Palpations: Abdomen is soft.      Tenderness: There is no abdominal tenderness. There is no guarding or rebound.   Musculoskeletal:         General: No swelling or tenderness.      Cervical back: Normal range of motion and neck supple.      Comments: Right arm edematous, no thrill at AV fistula site, no bruit.  Right sided upper and lower extremity weakness.    Skin:     General: Skin is warm and dry.      Findings: No erythema or rash.   Neurological:      Mental Status: He is alert.      Cranial Nerves: No cranial nerve deficit.      Motor: No weakness.      Comments: Pt able to answer in one word statements, does nod and shake head to questioning.            Significant Labs: All pertinent lab results from the last 24 hours have been reviewed.    Significant Imaging: Echocardiogram: Transthoracic echo (TTE) complete (Cupid Only):   Results for orders placed or performed during the hospital encounter of 05/12/23   Echo   Result Value Ref Range    BSA 1.65 m2    TDI SEPTAL 0.10 m/s    LV LATERAL E/E' RATIO 24.25 m/s    LV SEPTAL E/E' RATIO 9.70 m/s    IVC diameter 1.55 cm    RV mid diameter 2.56 cm    AORTIC VALVE CUSP SEPERATION 1.34 cm    TDI LATERAL 0.04 m/s    LVIDd 4.79 3.5 - 6.0 cm    IVS 1.21 (A) 0.6 - 1.1 cm    Posterior Wall 1.23 (A) 0.6 - 1.1 cm    LVIDs 3.08 2.1 - 4.0 cm    FS 36 28 - 44 %    LV mass 223.60 g    LA size 3.98 cm     RVDD 3.54 cm    Right ventricular length in diastole (apical 4-chamber view) 5.97 cm    Left Ventricle Relative Wall Thickness 0.51 cm    AV regurgitation pressure 1/2 time 428 ms    AV mean gradient 10 mmHg    AV valve area 1.53 cm2    AV index (prosthetic) 0.54     E/A ratio 1.31     Mean e' 0.07 m/s    E wave deceleration time 150.00 msec    LVOT diameter 1.89 cm    LVOT area 2.8 cm2    LVOT peak VTI 24.08 cm    Ao peak el 2.3 m/s    Ao VTI 44.22 cm    LVOT stroke volume 67.52 cm3    AV peak gradient 21 mmHg    E/E' ratio 13.86 m/s    MV Peak E El 0.97 m/s    AR Max El 5.42 m/s    TR Max El 3.04 m/s    MV Peak A El 0.74 m/s    LV Systolic Volume 29.90 mL    LV Systolic Volume Index 17.9 mL/m2    LV Diastolic Volume 64.30 mL    LV Diastolic Volume Index 38.50 mL/m2    LV Mass Index 134 g/m2    Triscuspid Valve Regurgitation Peak Gradient 37 mmHg    RA Width 4.93 cm    Ansari's Biplane MOD Ejection Fraction 5 %    EF 50 %    Narrative    · There is no evidence of intracardiac shunting.  · The left ventricle is normal in size with mild concentric hypertrophy   and low normal systolic function.  · The estimated ejection fraction is 50%.  · Indeterminate left ventricular diastolic function.  · Atrial fibrillation not observed.  · Normal right ventricular size.  · Moderate right atrial enlargement.  · Mild-to-moderate aortic regurgitation.  · Mild-to-moderate mitral regurgitation.  · Mild tricuspid regurgitation.  · Mild pulmonic regurgitation.        Assessment and Plan:         * Hemorrhage of arteriovenous fistula  Bleeding controlled, AV fistula appears non-functional, awaiting surgical consultation.    Flaccid hemiplegia affecting right dominant side  Post stoke, no change, continue supportive care.     Anemia  Acute blood loss anemia due to bleeding at right arm AV fistula.     Type 2 MI (myocardial infarction)  Troponin elevation most consistent with type 2 MI, demand perfusion mismatch, no indication  of ACS, previous echo shows normal LV systolic function, recommend continued medical tx, is a candidate for outpatient provacative testing with lexiscan cardiolyte stress imaging. Our office will arrange outpatient follow up and schedule stress imaging.     6/2/23  EF 50%  Cardiology will sign off at this time. Thank you for consulting. Call with questions or concerns   NM perfusion  Impression:   1. Dense, extensive segment lacking radiotracer uptake in the lateral wall extending into the inferior wall sparing the septum consistent with scar but without ischemia.  2. Mild left ventricular enlargement with lateral wall akinesis/dyskinesis and overall mildly impaired left ventricular systolic function, ejection fraction 41%.       Primary hypertension  Adequate control on current meds    ESRD (end stage renal disease)  Following with nephrology for ESRD, dialysis dependent.         VTE Risk Mitigation (From admission, onward)         Ordered     heparin (porcine) injection 4,000 Units  As needed (PRN)         05/31/23 1340     Reason for No Pharmacological VTE Prophylaxis  Once        Question:  Reasons:  Answer:  Active Bleeding    05/29/23 0154     IP VTE HIGH RISK PATIENT  Once         05/29/23 0154     Place sequential compression device  Until discontinued         05/29/23 0154                FABIANA Baldwin  Cardiology  [facility] - [address] [place] [address]

## 2023-06-02 NOTE — PROGRESS NOTES
Patient has his eyes open he fixes and follows he seem to nod his head to 1 of my interrogations but I am not sure it was not just incidental nodding.    Physical exam general patient chronically ill-appearing, heart is regular rate and rhythm, he has no pitting edema, lungs are clear to auscultation anteriorly, abdomen is soft with positive bowel sounds    Assessment/plan 1.  ESRD-patient is status post tunneled catheter placement.  Continue HD support  2.  Hyperkalemia-patient's potassium is 4.4 this morning.  3. Hyperlipidemia  4.  Anemia-patient's hematocrit is around 25%, I will start him on EPO      Vitals:    06/02/23 0000 06/02/23 0400 06/02/23 0705 06/02/23 1100   BP: 131/72 (!) 143/64 139/69 129/63   BP Location:   Right arm Left arm   Patient Position:   Lying Lying   Pulse: 89 (!) 57 70 60   Resp: 18 18 18 18   Temp: 98.2 °F (36.8 °C) 98.2 °F (36.8 °C) 98.1 °F (36.7 °C) 98 °F (36.7 °C)   TempSrc:    Oral   SpO2: 98% 96% 96% 96%   Weight:       Height:

## 2023-06-02 NOTE — SUBJECTIVE & OBJECTIVE
Interval History:     NAEO  H/h stable  Pt feels at baseline now  Cardiology recommends current care, no intervention, ok to d/c from cardio and renal stand point awaiting placement now.       Review of Systems   Unable to perform ROS: Patient nonverbal   Objective:     Vital Signs (Most Recent):  Temp: 98 °F (36.7 °C) (06/02/23 1100)  Pulse: 60 (06/02/23 1100)  Resp: 18 (06/02/23 1100)  BP: 129/63 (06/02/23 1100)  SpO2: 96 % (06/02/23 1100) Vital Signs (24h Range):  Temp:  [98 °F (36.7 °C)-98.6 °F (37 °C)] 98 °F (36.7 °C)  Pulse:  [] 60  Resp:  [16-18] 18  SpO2:  [96 %-99 %] 96 %  BP: (129-160)/(55-85) 129/63     Weight: 55.3 kg (122 lb)  Body mass index is 18.55 kg/m².  No intake or output data in the 24 hours ending 06/02/23 1139        Physical Exam  Vitals reviewed.   Constitutional:       General: He is not in acute distress.     Appearance: He is not toxic-appearing.   HENT:      Head: Normocephalic and atraumatic.      Nose: Nose normal.      Mouth/Throat:      Mouth: Mucous membranes are moist.      Pharynx: Oropharynx is clear.   Eyes:      General: No scleral icterus.     Conjunctiva/sclera: Conjunctivae normal.      Pupils: Pupils are equal, round, and reactive to light.   Cardiovascular:      Rate and Rhythm: Normal rate and regular rhythm.   Pulmonary:      Effort: No respiratory distress.      Breath sounds: Normal breath sounds.   Abdominal:      General: Bowel sounds are normal.      Palpations: Abdomen is soft. There is no mass.   Musculoskeletal:         General: No swelling or tenderness.      Cervical back: Normal range of motion and neck supple.   Lymphadenopathy:      Cervical: No cervical adenopathy.   Skin:     General: Skin is warm and dry.      Findings: No erythema.   Neurological:      Mental Status: Mental status is at baseline.      Comments: Patient was nonverbal, he did follow commands, he has some weakness involving his right side.   Psychiatric:         Mood and Affect: Mood  normal.         Behavior: Behavior normal.           Significant Labs: All pertinent labs within the past 24 hours have been reviewed.    Significant Imaging: I have reviewed all pertinent imaging results/findings within the past 24 hours.

## 2023-06-02 NOTE — PLAN OF CARE
Spoke with jorge at [facility] since pt is now ready for dc, updated clinicals faxed, precert started, updated Dr. Alba

## 2023-06-02 NOTE — PROGRESS NOTES
[facility] - [address] [place] [address] Medicine  Progress Note    Patient Name: Pardeep Collins  MRN: [id number]  Patient Class: IP- Inpatient   Admission Date: 5/28/2023  Length of Stay: 4 days  Attending Physician: Rodrigo Alba MD  Primary Care Provider: Provider Not[employer]        Subjective:     Principal Problem:Hemorrhage of arteriovenous fistula        HPI:  Patient is an 82 year old male that was brought to [facility] via EMS for hemorrhage from AV fistula site. The patient has a PMHx of ESRD, HTN and CVA. The patient resides at [facility] and was reported to be bleeding from AV fistula site in right arm. It was reported that the patient had large amount of blood loss from the hemorrhage (photos in media). A pressure dressing was placed and the bleeding stopped. The patient has difficulty communicating verbally due to previous CVA but does nod in response to questions. The patient appears to deny any pain and does not appear in distress.    In ED, patient was noted to have /89 and this dropped to 70s systolic. In the ED the patient received 2L lactated ringers and 2 units pRBCs with initial hemoglobin of 6.5, improving to 8.8. The patient reported no chest pain, however troponin level was checked x2 with 31 and an increase to 229 seen.      Per chart review, the patient sees Dr. Aceves as his Nephrologist and had his AV fistula (Right arm) placed by Dr. Sierra on 3/20/23.  The patient has dialysis scheduled Tuesday, Thursday and Saturday. Last dialysis was Saturday 5/27/2023.     The patient will be admitted to [facility] for continued care and medical management.       Overview/Hospital Course:  No notes on file    Interval History:     NAEO  H/h stable  Pt feels at baseline now  Cardiology recommends current care, no intervention, ok to d/c from cardio and renal stand point awaiting placement now.       Review of Systems   Unable to perform ROS: Patient nonverbal    Objective:     Vital Signs (Most Recent):  Temp: 98 °F (36.7 °C) (06/02/23 1100)  Pulse: 60 (06/02/23 1100)  Resp: 18 (06/02/23 1100)  BP: 129/63 (06/02/23 1100)  SpO2: 96 % (06/02/23 1100) Vital Signs (24h Range):  Temp:  [98 °F (36.7 °C)-98.6 °F (37 °C)] 98 °F (36.7 °C)  Pulse:  [] 60  Resp:  [16-18] 18  SpO2:  [96 %-99 %] 96 %  BP: (129-160)/(55-85) 129/63     Weight: 55.3 kg (122 lb)  Body mass index is 18.55 kg/m².  No intake or output data in the 24 hours ending 06/02/23 1139        Physical Exam  Vitals reviewed.   Constitutional:       General: He is not in acute distress.     Appearance: He is not toxic-appearing.   HENT:      Head: Normocephalic and atraumatic.      Nose: Nose normal.      Mouth/Throat:      Mouth: Mucous membranes are moist.      Pharynx: Oropharynx is clear.   Eyes:      General: No scleral icterus.     Conjunctiva/sclera: Conjunctivae normal.      Pupils: Pupils are equal, round, and reactive to light.   Cardiovascular:      Rate and Rhythm: Normal rate and regular rhythm.   Pulmonary:      Effort: No respiratory distress.      Breath sounds: Normal breath sounds.   Abdominal:      General: Bowel sounds are normal.      Palpations: Abdomen is soft. There is no mass.   Musculoskeletal:         General: No swelling or tenderness.      Cervical back: Normal range of motion and neck supple.   Lymphadenopathy:      Cervical: No cervical adenopathy.   Skin:     General: Skin is warm and dry.      Findings: No erythema.   Neurological:      Mental Status: Mental status is at baseline.      Comments: Patient was nonverbal, he did follow commands, he has some weakness involving his right side.   Psychiatric:         Mood and Affect: Mood normal.         Behavior: Behavior normal.           Significant Labs: All pertinent labs within the past 24 hours have been reviewed.    Significant Imaging: I have reviewed all pertinent imaging results/findings within the past 24  hours.      Assessment/Plan:      * Hemorrhage of arteriovenous fistula  - R arm bleeding while at nursing home 5/28/2023  - Bleeding stopped in ED with pressure dressing  - Successful dialysis last reported on 5/27/2023  - Holding home aspirin  - General surgery consulted, appreciate assistance  - AV fistula (Right arm) placed by Dr. Sierra on 3/20/23.  - Initial H/H 6.5/21, after 2 units of pRBCs H/H 8.8/25.9    Anemia  - Initial hemoglobin in ED 6.5, after 2 units pRBCs hemoglobin 8.8  - Will continue to monitor      Type 2 MI (myocardial infarction)  - Likely 2/2 to acute blood loss anemia  - Troponin x3 - 31, 229, pending  - EKG - sinus tachycardia    5/30-cardiology recs pending.     Primary hypertension  - Patient hypotensive on admission  - Holding home medication      ESRD (end stage renal disease)  - Patient reportedly followed by Dr. Aceves  - Dialysis schedule Tuesday, Thursday, Saturday  - Nephrology consulted, appreciate assistance  - Last dialysis Saturday 5/27/2023        VTE Risk Mitigation (From admission, onward)         Ordered     heparin (porcine) injection 4,000 Units  As needed (PRN)         05/31/23 1340     Reason for No Pharmacological VTE Prophylaxis  Once        Question:  Reasons:  Answer:  Active Bleeding    05/29/23 0154     IP VTE HIGH RISK PATIENT  Once         05/29/23 0154     Place sequential compression device  Until discontinued         05/29/23 0154                Discharge Planning   RASHAD:      Code Status: Full Code   Is the patient medically ready for discharge?:     Reason for patient still in hospital (select all that apply): Pending disposition  Discharge Plan A: Return to nursing home                  Rehmat NATALI Alba MD  Department of Hospital Medicine   [facility] - [facility] Tele[facility]

## 2023-06-02 NOTE — SUBJECTIVE & OBJECTIVE
Interval History: NAEO. Shakes head no when ask about chest pain or sob    Review of Systems   Unable to perform ROS: Patient nonverbal   Objective:     Vital Signs (Most Recent):  Temp: 98 °F (36.7 °C) (06/02/23 1500)  Pulse: 73 (06/02/23 1700)  Resp: 18 (06/02/23 1700)  BP: (!) 98/52 (06/02/23 1700)  SpO2: 96 % (06/02/23 1100) Vital Signs (24h Range):  Temp:  [98 °F (36.7 °C)-98.6 °F (37 °C)] 98 °F (36.7 °C)  Pulse:  [54-92] 73  Resp:  [16-18] 18  SpO2:  [96 %-98 %] 96 %  BP: ()/(48-85) 98/52     Weight: 55.3 kg (122 lb)  Body mass index is 18.55 kg/m².     SpO2: 96 %       No intake or output data in the 24 hours ending 06/02/23 1841    Lines/Drains/Airways       Central Venous Catheter Line  Duration                  Hemodialysis Catheter 05/31/23 1400 right subclavian 2 days              Drain  Duration                  Hemodialysis AV Fistula 03/20/23 Right upper arm 74 days              Peripheral Intravenous Line  Duration                  Peripheral IV - Single Lumen 05/28/23 2115 20 G Anterior;Left Forearm 4 days                       Physical Exam  Vitals and nursing note reviewed.   Constitutional:       Appearance: Normal appearance. He is normal weight.   HENT:      Head: Normocephalic and atraumatic.      Right Ear: External ear normal.      Left Ear: External ear normal.   Eyes:      General: No scleral icterus.        Right eye: No discharge.         Left eye: No discharge.      Extraocular Movements: Extraocular movements intact.      Conjunctiva/sclera: Conjunctivae normal.      Pupils: Pupils are equal, round, and reactive to light.   Neck:      Vascular: No carotid bruit.   Cardiovascular:      Rate and Rhythm: Normal rate and regular rhythm.      Pulses: Normal pulses.      Heart sounds: Murmur heard.     No friction rub. No gallop.   Pulmonary:      Effort: Pulmonary effort is normal.      Breath sounds: Normal breath sounds. No wheezing, rhonchi or rales.   Chest:      Chest wall: No  tenderness.   Abdominal:      General: Abdomen is flat. Bowel sounds are normal. There is no distension.      Palpations: Abdomen is soft.      Tenderness: There is no abdominal tenderness. There is no guarding or rebound.   Musculoskeletal:         General: No swelling or tenderness.      Cervical back: Normal range of motion and neck supple.      Comments: Right arm edematous, no thrill at AV fistula site, no bruit.  Right sided upper and lower extremity weakness.    Skin:     General: Skin is warm and dry.      Findings: No erythema or rash.   Neurological:      Mental Status: He is alert.      Cranial Nerves: No cranial nerve deficit.      Motor: No weakness.      Comments: Pt able to answer in one word statements, does nod and shake head to questioning.            Significant Labs: All pertinent lab results from the last 24 hours have been reviewed.    Significant Imaging: Echocardiogram: Transthoracic echo (TTE) complete (Cupid Only):   Results for orders placed or performed during the hospital encounter of 05/12/23   Echo   Result Value Ref Range    BSA 1.65 m2    TDI SEPTAL 0.10 m/s    LV LATERAL E/E' RATIO 24.25 m/s    LV SEPTAL E/E' RATIO 9.70 m/s    IVC diameter 1.55 cm    RV mid diameter 2.56 cm    AORTIC VALVE CUSP SEPERATION 1.34 cm    TDI LATERAL 0.04 m/s    LVIDd 4.79 3.5 - 6.0 cm    IVS 1.21 (A) 0.6 - 1.1 cm    Posterior Wall 1.23 (A) 0.6 - 1.1 cm    LVIDs 3.08 2.1 - 4.0 cm    FS 36 28 - 44 %    LV mass 223.60 g    LA size 3.98 cm    RVDD 3.54 cm    Right ventricular length in diastole (apical 4-chamber view) 5.97 cm    Left Ventricle Relative Wall Thickness 0.51 cm    AV regurgitation pressure 1/2 time 428 ms    AV mean gradient 10 mmHg    AV valve area 1.53 cm2    AV index (prosthetic) 0.54     E/A ratio 1.31     Mean e' 0.07 m/s    E wave deceleration time 150.00 msec    LVOT diameter 1.89 cm    LVOT area 2.8 cm2    LVOT peak VTI 24.08 cm    Ao peak newton 2.3 m/s    Ao VTI 44.22 cm    LVOT stroke  volume 67.52 cm3    AV peak gradient 21 mmHg    E/E' ratio 13.86 m/s    MV Peak E El 0.97 m/s    AR Max El 5.42 m/s    TR Max El 3.04 m/s    MV Peak A El 0.74 m/s    LV Systolic Volume 29.90 mL    LV Systolic Volume Index 17.9 mL/m2    LV Diastolic Volume 64.30 mL    LV Diastolic Volume Index 38.50 mL/m2    LV Mass Index 134 g/m2    Triscuspid Valve Regurgitation Peak Gradient 37 mmHg    RA Width 4.93 cm    Ansari's Biplane MOD Ejection Fraction 5 %    EF 50 %    Narrative    · There is no evidence of intracardiac shunting.  · The left ventricle is normal in size with mild concentric hypertrophy   and low normal systolic function.  · The estimated ejection fraction is 50%.  · Indeterminate left ventricular diastolic function.  · Atrial fibrillation not observed.  · Normal right ventricular size.  · Moderate right atrial enlargement.  · Mild-to-moderate aortic regurgitation.  · Mild-to-moderate mitral regurgitation.  · Mild tricuspid regurgitation.  · Mild pulmonic regurgitation.

## 2023-06-03 LAB
ANION GAP SERPL CALCULATED.3IONS-SCNC: 15 MMOL/L (ref 7–16)
BASOPHILS # BLD AUTO: 0.01 K/UL (ref 0–0.2)
BASOPHILS NFR BLD AUTO: 0.1 % (ref 0–1)
BUN SERPL-MCNC: 42 MG/DL (ref 7–18)
BUN/CREAT SERPL: 8 (ref 6–20)
CALCIUM SERPL-MCNC: 8.4 MG/DL (ref 8.5–10.1)
CHLORIDE SERPL-SCNC: 102 MMOL/L (ref 98–107)
CO2 SERPL-SCNC: 26 MMOL/L (ref 21–32)
CREAT SERPL-MCNC: 5.11 MG/DL (ref 0.7–1.3)
DIFFERENTIAL METHOD BLD: ABNORMAL
EGFR (NO RACE VARIABLE) (RUSH/TITUS): 11 ML/MIN/1.73M2
EOSINOPHIL # BLD AUTO: 0.11 K/UL (ref 0–0.5)
EOSINOPHIL NFR BLD AUTO: 1.4 % (ref 1–4)
ERYTHROCYTE [DISTWIDTH] IN BLOOD BY AUTOMATED COUNT: 14.2 % (ref 11.5–14.5)
GLUCOSE SERPL-MCNC: 103 MG/DL (ref 74–106)
GLUCOSE SERPL-MCNC: 125 MG/DL (ref 70–105)
GLUCOSE SERPL-MCNC: 135 MG/DL (ref 70–105)
GLUCOSE SERPL-MCNC: 136 MG/DL (ref 70–105)
GLUCOSE SERPL-MCNC: 141 MG/DL (ref 70–105)
HCT VFR BLD AUTO: 25.5 % (ref 40–54)
HGB BLD-MCNC: 8.1 G/DL (ref 13.5–18)
IMM GRANULOCYTES # BLD AUTO: 0.06 K/UL (ref 0–0.04)
IMM GRANULOCYTES NFR BLD: 0.8 % (ref 0–0.4)
LYMPHOCYTES # BLD AUTO: 0.89 K/UL (ref 1–4.8)
LYMPHOCYTES NFR BLD AUTO: 11.1 % (ref 27–41)
MCH RBC QN AUTO: 30.8 PG (ref 27–31)
MCHC RBC AUTO-ENTMCNC: 31.8 G/DL (ref 32–36)
MCV RBC AUTO: 97 FL (ref 80–96)
MONOCYTES # BLD AUTO: 0.72 K/UL (ref 0–0.8)
MONOCYTES NFR BLD AUTO: 9 % (ref 2–6)
MPC BLD CALC-MCNC: 11.8 FL (ref 9.4–12.4)
NEUTROPHILS # BLD AUTO: 6.21 K/UL (ref 1.8–7.7)
NEUTROPHILS NFR BLD AUTO: 77.6 % (ref 53–65)
NRBC # BLD AUTO: 0 X10E3/UL
NRBC, AUTO (.00): 0 %
PLATELET # BLD AUTO: 148 K/UL (ref 150–400)
POTASSIUM SERPL-SCNC: 4.4 MMOL/L (ref 3.5–5.1)
RBC # BLD AUTO: 2.63 M/UL (ref 4.6–6.2)
SODIUM SERPL-SCNC: 139 MMOL/L (ref 136–145)
WBC # BLD AUTO: 8 K/UL (ref 4.5–11)

## 2023-06-03 PROCEDURE — 25000003 PHARM REV CODE 250: Performed by: INTERNAL MEDICINE

## 2023-06-03 PROCEDURE — 82962 GLUCOSE BLOOD TEST: CPT

## 2023-06-03 PROCEDURE — 99232 SBSQ HOSP IP/OBS MODERATE 35: CPT | Mod: ,,, | Performed by: INTERNAL MEDICINE

## 2023-06-03 PROCEDURE — 25000003 PHARM REV CODE 250

## 2023-06-03 PROCEDURE — 99232 PR SUBSEQUENT HOSPITAL CARE,LEVL II: ICD-10-PCS | Mod: ,,, | Performed by: INTERNAL MEDICINE

## 2023-06-03 PROCEDURE — 80048 BASIC METABOLIC PNL TOTAL CA: CPT

## 2023-06-03 PROCEDURE — 99900035 HC TECH TIME PER 15 MIN (STAT)

## 2023-06-03 PROCEDURE — 85025 COMPLETE CBC W/AUTO DIFF WBC: CPT

## 2023-06-03 PROCEDURE — 11000001 HC ACUTE MED/SURG PRIVATE ROOM

## 2023-06-03 PROCEDURE — 94761 N-INVAS EAR/PLS OXIMETRY MLT: CPT

## 2023-06-03 PROCEDURE — 63600175 PHARM REV CODE 636 W HCPCS: Mod: JZ,JG | Performed by: INTERNAL MEDICINE

## 2023-06-03 PROCEDURE — 27000221 HC OXYGEN, UP TO 24 HOURS

## 2023-06-03 RX ORDER — NAPROXEN SODIUM 220 MG/1
81 TABLET, FILM COATED ORAL DAILY
Status: DISCONTINUED | OUTPATIENT
Start: 2023-06-03 | End: 2023-06-06 | Stop reason: HOSPADM

## 2023-06-03 RX ADMIN — ASPIRIN 81 MG 81 MG: 81 TABLET ORAL at 01:06

## 2023-06-03 RX ADMIN — EPOETIN ALFA-EPBX 10000 UNITS: 10000 INJECTION, SOLUTION INTRAVENOUS; SUBCUTANEOUS at 08:06

## 2023-06-03 RX ADMIN — MUPIROCIN: 20 OINTMENT TOPICAL at 08:06

## 2023-06-03 RX ADMIN — ATORVASTATIN CALCIUM 80 MG: 80 TABLET, FILM COATED ORAL at 09:06

## 2023-06-03 RX ADMIN — MUPIROCIN: 20 OINTMENT TOPICAL at 09:06

## 2023-06-03 NOTE — PROGRESS NOTES
Patient has his eyes open he fixes and follows he is nodding his head appropriately questions.  He is able to communicate to me that he wanted to be reposition in bed    Physical exam general patient chronically ill-appearing, heart is regular rate and rhythm, he has no pitting edema, lungs are clear to auscultation anteriorly, abdomen is soft with positive bowel sounds    Assessment/plan 1.  ESRD-patient is status post tunneled catheter placement.  Continue HD support  2.  Hyperkalemia-patient's potassium is 4.4 this morning.  3. Hyperlipidemia  4.  Anemia-patient's hematocrit is around 25%, continue EPO      Vitals:    06/03/23 0023 06/03/23 0412 06/03/23 0600 06/03/23 0705   BP: (!) 106/57 (!) 147/63 136/65 136/65   Pulse: 67 (!) 54 73 73   Resp: 20 16 16 16   Temp: 98.7 °F (37.1 °C) 98.9 °F (37.2 °C) 99.3 °F (37.4 °C) 99.3 °F (37.4 °C)   TempSrc:       SpO2: 97% 95% 96% 96%   Weight:       Height:

## 2023-06-03 NOTE — PROGRESS NOTES
[facility] - [address] [place] [address] Medicine  Progress Note    Patient Name: Pardeep Collins  MRN: [id number]  Patient Class: IP- Inpatient   Admission Date: 5/28/2023  Length of Stay: 5 days  Attending Physician: Rodrigo Alba MD  Primary Care Provider: Provider Not[employer]ystem        Subjective:     Principal Problem:Hemorrhage of arteriovenous fistula        HPI:  Patient is an 82 year old male that was brought to [facility] via EMS for hemorrhage from AV fistula site. The patient has a PMHx of ESRD, HTN and CVA. The patient resides at [facility] and was reported to be bleeding from AV fistula site in right arm. It was reported that the patient had large amount of blood loss from the hemorrhage (photos in media). A pressure dressing was placed and the bleeding stopped. The patient has difficulty communicating verbally due to previous CVA but does nod in response to questions. The patient appears to deny any pain and does not appear in distress.    In ED, patient was noted to have /89 and this dropped to 70s systolic. In the ED the patient received 2L lactated ringers and 2 units pRBCs with initial hemoglobin of 6.5, improving to 8.8. The patient reported no chest pain, however troponin level was checked x2 with 31 and an increase to 229 seen.      Per chart review, the patient sees Dr. Aceves as his Nephrologist and had his AV fistula (Right arm) placed by Dr. Sierra on 3/20/23.  The patient has dialysis scheduled Tuesday, Thursday and Saturday. Last dialysis was Saturday 5/27/2023.     The patient will be admitted to [facility] for continued care and medical management.       Overview/Hospital Course:  No notes on file    Interval History:     NAEO  H/h stable, patient pending placement.   Restart baby asa.     Review of Systems   Unable to perform ROS: Patient nonverbal   Objective:     Vital Signs (Most Recent):  Temp: 99.3 °F (37.4 °C) (06/03/23 0705)  Pulse: 73 (06/03/23  0705)  Resp: 16 (06/03/23 0705)  BP: 136/65 (06/03/23 0705)  SpO2: 96 % (06/03/23 0705) Vital Signs (24h Range):  Temp:  [98 °F (36.7 °C)-99.5 °F (37.5 °C)] 99.3 °F (37.4 °C)  Pulse:  [54-96] 73  Resp:  [16-20] 16  SpO2:  [95 %-97 %] 96 %  BP: ()/(48-65) 136/65     Weight: 55.3 kg (122 lb)  Body mass index is 18.55 kg/m².    Intake/Output Summary (Last 24 hours) at 6/3/2023 1152  Last data filed at 6/3/2023 0800  Gross per 24 hour   Intake 2 ml   Output --   Net 2 ml           Physical Exam  Vitals reviewed.   Constitutional:       General: He is not in acute distress.     Appearance: He is not toxic-appearing.   HENT:      Head: Normocephalic and atraumatic.      Nose: Nose normal.      Mouth/Throat:      Mouth: Mucous membranes are moist.      Pharynx: Oropharynx is clear.   Eyes:      General: No scleral icterus.     Conjunctiva/sclera: Conjunctivae normal.      Pupils: Pupils are equal, round, and reactive to light.   Cardiovascular:      Rate and Rhythm: Normal rate and regular rhythm.   Pulmonary:      Effort: No respiratory distress.      Breath sounds: Normal breath sounds.   Abdominal:      General: Bowel sounds are normal.      Palpations: Abdomen is soft. There is no mass.   Musculoskeletal:         General: No swelling or tenderness.      Cervical back: Normal range of motion and neck supple.   Lymphadenopathy:      Cervical: No cervical adenopathy.   Skin:     General: Skin is warm and dry.      Findings: No erythema.   Neurological:      Mental Status: Mental status is at baseline.      Comments: Patient was nonverbal, he did follow commands, he has some weakness involving his right side.   Psychiatric:         Mood and Affect: Mood normal.         Behavior: Behavior normal.           Significant Labs: All pertinent labs within the past 24 hours have been reviewed.    Significant Imaging: I have reviewed all pertinent imaging results/findings within the past 24 hours.      Assessment/Plan:      *  Hemorrhage of arteriovenous fistula  - R arm bleeding while at nursing home 5/28/2023  - Bleeding stopped in ED with pressure dressing  - Successful dialysis last reported on 5/27/2023  - Holding home aspirin  - General surgery consulted, appreciate assistance  - AV fistula (Right arm) placed by Dr. Sierra on 3/20/23.  - Initial H/H 6.5/21, after 2 units of pRBCs H/H 8.8/25.9    Anemia  - Initial hemoglobin in ED 6.5, after 2 units pRBCs hemoglobin 8.8  - Will continue to monitor      Type 2 MI (myocardial infarction)  - Likely 2/2 to acute blood loss anemia  - Troponin x3 - 31, 229, pending  - EKG - sinus tachycardia    5/30-cardiology recs pending.     Primary hypertension  - Patient hypotensive on admission  - Holding home medication      ESRD (end stage renal disease)  - Patient reportedly followed by Dr. Aceves  - Dialysis schedule Tuesday, Thursday, Saturday  - Nephrology consulted, appreciate assistance  - Last dialysis Saturday 5/27/2023        VTE Risk Mitigation (From admission, onward)         Ordered     heparin (porcine) injection 4,000 Units  As needed (PRN)         05/31/23 1340     Reason for No Pharmacological VTE Prophylaxis  Once        Question:  Reasons:  Answer:  Active Bleeding    05/29/23 0154     IP VTE HIGH RISK PATIENT  Once         05/29/23 0154     Place sequential compression device  Until discontinued         05/29/23 0154                Discharge Planning   RASHAD:      Code Status: Full Code   Is the patient medically ready for discharge?:     Reason for patient still in hospital (select all that apply): Treatment  Discharge Plan A: Return to nursing home                  Rehmat NATALI Alba MD  Department of Hospital Medicine   [facility] - [facility]

## 2023-06-03 NOTE — SUBJECTIVE & OBJECTIVE
Interval History:     NAEO  H/h stable, patient pending placement.       Review of Systems   Unable to perform ROS: Patient nonverbal   Objective:     Vital Signs (Most Recent):  Temp: 99.3 °F (37.4 °C) (06/03/23 0705)  Pulse: 73 (06/03/23 0705)  Resp: 16 (06/03/23 0705)  BP: 136/65 (06/03/23 0705)  SpO2: 96 % (06/03/23 0705) Vital Signs (24h Range):  Temp:  [98 °F (36.7 °C)-99.5 °F (37.5 °C)] 99.3 °F (37.4 °C)  Pulse:  [54-96] 73  Resp:  [16-20] 16  SpO2:  [95 %-97 %] 96 %  BP: ()/(48-65) 136/65     Weight: 55.3 kg (122 lb)  Body mass index is 18.55 kg/m².    Intake/Output Summary (Last 24 hours) at 6/3/2023 1152  Last data filed at 6/3/2023 0800  Gross per 24 hour   Intake 2 ml   Output --   Net 2 ml           Physical Exam  Vitals reviewed.   Constitutional:       General: He is not in acute distress.     Appearance: He is not toxic-appearing.   HENT:      Head: Normocephalic and atraumatic.      Nose: Nose normal.      Mouth/Throat:      Mouth: Mucous membranes are moist.      Pharynx: Oropharynx is clear.   Eyes:      General: No scleral icterus.     Conjunctiva/sclera: Conjunctivae normal.      Pupils: Pupils are equal, round, and reactive to light.   Cardiovascular:      Rate and Rhythm: Normal rate and regular rhythm.   Pulmonary:      Effort: No respiratory distress.      Breath sounds: Normal breath sounds.   Abdominal:      General: Bowel sounds are normal.      Palpations: Abdomen is soft. There is no mass.   Musculoskeletal:         General: No swelling or tenderness.      Cervical back: Normal range of motion and neck supple.   Lymphadenopathy:      Cervical: No cervical adenopathy.   Skin:     General: Skin is warm and dry.      Findings: No erythema.   Neurological:      Mental Status: Mental status is at baseline.      Comments: Patient was nonverbal, he did follow commands, he has some weakness involving his right side.   Psychiatric:         Mood and Affect: Mood normal.         Behavior:  Behavior normal.           Significant Labs: All pertinent labs within the past 24 hours have been reviewed.    Significant Imaging: I have reviewed all pertinent imaging results/findings within the past 24 hours.

## 2023-06-04 LAB
ANION GAP SERPL CALCULATED.3IONS-SCNC: 20 MMOL/L (ref 7–16)
BASOPHILS # BLD AUTO: 0.02 K/UL (ref 0–0.2)
BASOPHILS NFR BLD AUTO: 0.3 % (ref 0–1)
BUN SERPL-MCNC: 60 MG/DL (ref 7–18)
BUN/CREAT SERPL: 9 (ref 6–20)
CALCIUM SERPL-MCNC: 8.7 MG/DL (ref 8.5–10.1)
CHLORIDE SERPL-SCNC: 101 MMOL/L (ref 98–107)
CO2 SERPL-SCNC: 24 MMOL/L (ref 21–32)
CREAT SERPL-MCNC: 6.8 MG/DL (ref 0.7–1.3)
DIFFERENTIAL METHOD BLD: ABNORMAL
EGFR (NO RACE VARIABLE) (RUSH/TITUS): 8 ML/MIN/1.73M2
EOSINOPHIL # BLD AUTO: 0.19 K/UL (ref 0–0.5)
EOSINOPHIL NFR BLD AUTO: 2.6 % (ref 1–4)
ERYTHROCYTE [DISTWIDTH] IN BLOOD BY AUTOMATED COUNT: 13.8 % (ref 11.5–14.5)
GLUCOSE SERPL-MCNC: 105 MG/DL (ref 74–106)
GLUCOSE SERPL-MCNC: 112 MG/DL (ref 70–105)
GLUCOSE SERPL-MCNC: 173 MG/DL (ref 70–105)
GLUCOSE SERPL-MCNC: 264 MG/DL (ref 70–105)
GLUCOSE SERPL-MCNC: 345 MG/DL (ref 70–105)
HCT VFR BLD AUTO: 25.9 % (ref 40–54)
HGB BLD-MCNC: 8.3 G/DL (ref 13.5–18)
IMM GRANULOCYTES # BLD AUTO: 0.04 K/UL (ref 0–0.04)
IMM GRANULOCYTES NFR BLD: 0.5 % (ref 0–0.4)
LYMPHOCYTES # BLD AUTO: 1 K/UL (ref 1–4.8)
LYMPHOCYTES NFR BLD AUTO: 13.7 % (ref 27–41)
MCH RBC QN AUTO: 31 PG (ref 27–31)
MCHC RBC AUTO-ENTMCNC: 32 G/DL (ref 32–36)
MCV RBC AUTO: 96.6 FL (ref 80–96)
MONOCYTES # BLD AUTO: 0.64 K/UL (ref 0–0.8)
MONOCYTES NFR BLD AUTO: 8.8 % (ref 2–6)
MPC BLD CALC-MCNC: 12.5 FL (ref 9.4–12.4)
NEUTROPHILS # BLD AUTO: 5.39 K/UL (ref 1.8–7.7)
NEUTROPHILS NFR BLD AUTO: 74.1 % (ref 53–65)
NRBC # BLD AUTO: 0 X10E3/UL
NRBC, AUTO (.00): 0 %
PLATELET # BLD AUTO: 154 K/UL (ref 150–400)
POTASSIUM SERPL-SCNC: 4.7 MMOL/L (ref 3.5–5.1)
RBC # BLD AUTO: 2.68 M/UL (ref 4.6–6.2)
SODIUM SERPL-SCNC: 140 MMOL/L (ref 136–145)
WBC # BLD AUTO: 7.28 K/UL (ref 4.5–11)

## 2023-06-04 PROCEDURE — 99900035 HC TECH TIME PER 15 MIN (STAT)

## 2023-06-04 PROCEDURE — 99232 SBSQ HOSP IP/OBS MODERATE 35: CPT | Mod: ,,, | Performed by: INTERNAL MEDICINE

## 2023-06-04 PROCEDURE — 11000001 HC ACUTE MED/SURG PRIVATE ROOM

## 2023-06-04 PROCEDURE — 99232 PR SUBSEQUENT HOSPITAL CARE,LEVL II: ICD-10-PCS | Mod: ,,, | Performed by: INTERNAL MEDICINE

## 2023-06-04 PROCEDURE — 63600175 PHARM REV CODE 636 W HCPCS

## 2023-06-04 PROCEDURE — 80048 BASIC METABOLIC PNL TOTAL CA: CPT

## 2023-06-04 PROCEDURE — 25000003 PHARM REV CODE 250: Performed by: INTERNAL MEDICINE

## 2023-06-04 PROCEDURE — 94761 N-INVAS EAR/PLS OXIMETRY MLT: CPT

## 2023-06-04 PROCEDURE — 85025 COMPLETE CBC W/AUTO DIFF WBC: CPT

## 2023-06-04 PROCEDURE — 82962 GLUCOSE BLOOD TEST: CPT

## 2023-06-04 PROCEDURE — 25000003 PHARM REV CODE 250

## 2023-06-04 RX ADMIN — INSULIN ASPART 3 UNITS: 100 INJECTION, SOLUTION INTRAVENOUS; SUBCUTANEOUS at 05:06

## 2023-06-04 RX ADMIN — MUPIROCIN: 20 OINTMENT TOPICAL at 09:06

## 2023-06-04 RX ADMIN — ATORVASTATIN CALCIUM 80 MG: 80 TABLET, FILM COATED ORAL at 09:06

## 2023-06-04 RX ADMIN — INSULIN ASPART 4 UNITS: 100 INJECTION, SOLUTION INTRAVENOUS; SUBCUTANEOUS at 01:06

## 2023-06-04 RX ADMIN — ASPIRIN 81 MG 81 MG: 81 TABLET ORAL at 09:06

## 2023-06-04 NOTE — SUBJECTIVE & OBJECTIVE
Interval History:     NAEO  H/h stable now on asa   Pending placement.       Review of Systems   Unable to perform ROS: Patient nonverbal   Objective:     Vital Signs (Most Recent):  Temp: 98.4 °F (36.9 °C) (06/04/23 0400)  Pulse: 60 (06/04/23 0400)  Resp: 18 (06/04/23 0400)  BP: (!) 150/72 (06/04/23 0400)  SpO2: 96 % (06/04/23 0400) Vital Signs (24h Range):  Temp:  [97.6 °F (36.4 °C)-98.4 °F (36.9 °C)] 98.4 °F (36.9 °C)  Pulse:  [57-94] 60  Resp:  [16-18] 18  SpO2:  [91 %-96 %] 96 %  BP: (126-150)/(72-88) 150/72     Weight: 55.3 kg (122 lb)  Body mass index is 18.55 kg/m².  No intake or output data in the 24 hours ending 06/04/23 0834        Physical Exam  Vitals reviewed.   Constitutional:       General: He is not in acute distress.     Appearance: He is not toxic-appearing.   HENT:      Head: Normocephalic and atraumatic.      Nose: Nose normal.      Mouth/Throat:      Mouth: Mucous membranes are moist.      Pharynx: Oropharynx is clear.   Eyes:      General: No scleral icterus.     Conjunctiva/sclera: Conjunctivae normal.      Pupils: Pupils are equal, round, and reactive to light.   Cardiovascular:      Rate and Rhythm: Normal rate and regular rhythm.   Pulmonary:      Effort: No respiratory distress.      Breath sounds: Normal breath sounds.   Abdominal:      General: Bowel sounds are normal.      Palpations: Abdomen is soft. There is no mass.   Musculoskeletal:         General: No swelling or tenderness.      Cervical back: Normal range of motion and neck supple.   Lymphadenopathy:      Cervical: No cervical adenopathy.   Skin:     General: Skin is warm and dry.      Findings: No erythema.   Neurological:      Mental Status: Mental status is at baseline.      Comments: Patient was nonverbal, he did follow commands, he has some weakness involving his right side.   Psychiatric:         Mood and Affect: Mood normal.         Behavior: Behavior normal.           Significant Labs: All pertinent labs within the past  24 hours have been reviewed.    Significant Imaging: I have reviewed all pertinent imaging results/findings within the past 24 hours.

## 2023-06-04 NOTE — PROGRESS NOTES
[facility] - [address] [place] [address] Medicine  Progress Note    Patient Name: Pardeep Collins  MRN: [id number]  Patient Class: IP- Inpatient   Admission Date: 5/28/2023  Length of Stay: 6 days  Attending Physician: Rodrigo Alba MD  Primary Care Provider: Provider Not[employer]ystem        Subjective:     Principal Problem:Hemorrhage of arteriovenous fistula        HPI:  Patient is an 82 year old male that was brought to [facility] via EMS for hemorrhage from AV fistula site. The patient has a PMHx of ESRD, HTN and CVA. The patient resides at [facility] and was reported to be bleeding from AV fistula site in right arm. It was reported that the patient had large amount of blood loss from the hemorrhage (photos in media). A pressure dressing was placed and the bleeding stopped. The patient has difficulty communicating verbally due to previous CVA but does nod in response to questions. The patient appears to deny any pain and does not appear in distress.    In ED, patient was noted to have /89 and this dropped to 70s systolic. In the ED the patient received 2L lactated ringers and 2 units pRBCs with initial hemoglobin of 6.5, improving to 8.8. The patient reported no chest pain, however troponin level was checked x2 with 31 and an increase to 229 seen.      Per chart review, the patient sees Dr. Aceves as his Nephrologist and had his AV fistula (Right arm) placed by Dr. Sierra on 3/20/23.  The patient has dialysis scheduled Tuesday, Thursday and Saturday. Last dialysis was Saturday 5/27/2023.     The patient will be admitted to [facility] for continued care and medical management.       Overview/Hospital Course:  No notes on file    Interval History:     NAEO  H/h stable now on asa   Pending placement.       Review of Systems   Unable to perform ROS: Patient nonverbal   Objective:     Vital Signs (Most Recent):  Temp: 98.4 °F (36.9 °C) (06/04/23 0400)  Pulse: 60 (06/04/23 0400)  Resp: 18  (06/04/23 0400)  BP: (!) 150/72 (06/04/23 0400)  SpO2: 96 % (06/04/23 0400) Vital Signs (24h Range):  Temp:  [97.6 °F (36.4 °C)-98.4 °F (36.9 °C)] 98.4 °F (36.9 °C)  Pulse:  [57-94] 60  Resp:  [16-18] 18  SpO2:  [91 %-96 %] 96 %  BP: (126-150)/(72-88) 150/72     Weight: 55.3 kg (122 lb)  Body mass index is 18.55 kg/m².  No intake or output data in the 24 hours ending 06/04/23 0834        Physical Exam  Vitals reviewed.   Constitutional:       General: He is not in acute distress.     Appearance: He is not toxic-appearing.   HENT:      Head: Normocephalic and atraumatic.      Nose: Nose normal.      Mouth/Throat:      Mouth: Mucous membranes are moist.      Pharynx: Oropharynx is clear.   Eyes:      General: No scleral icterus.     Conjunctiva/sclera: Conjunctivae normal.      Pupils: Pupils are equal, round, and reactive to light.   Cardiovascular:      Rate and Rhythm: Normal rate and regular rhythm.   Pulmonary:      Effort: No respiratory distress.      Breath sounds: Normal breath sounds.   Abdominal:      General: Bowel sounds are normal.      Palpations: Abdomen is soft. There is no mass.   Musculoskeletal:         General: No swelling or tenderness.      Cervical back: Normal range of motion and neck supple.   Lymphadenopathy:      Cervical: No cervical adenopathy.   Skin:     General: Skin is warm and dry.      Findings: No erythema.   Neurological:      Mental Status: Mental status is at baseline.      Comments: Patient was nonverbal, he did follow commands, he has some weakness involving his right side.   Psychiatric:         Mood and Affect: Mood normal.         Behavior: Behavior normal.           Significant Labs: All pertinent labs within the past 24 hours have been reviewed.    Significant Imaging: I have reviewed all pertinent imaging results/findings within the past 24 hours.      Assessment/Plan:      * Hemorrhage of arteriovenous fistula  - R arm bleeding while at nursing home 5/28/2023  -  Bleeding stopped in ED with pressure dressing  - Successful dialysis last reported on 5/27/2023  - Holding home aspirin  - General surgery consulted, appreciate assistance  - AV fistula (Right arm) placed by Dr. Sierra on 3/20/23.  - Initial H/H 6.5/21, after 2 units of pRBCs H/H 8.8/25.9    Anemia  - Initial hemoglobin in ED 6.5, after 2 units pRBCs hemoglobin 8.8  - Will continue to monitor      Type 2 MI (myocardial infarction)  - Likely 2/2 to acute blood loss anemia  - Troponin x3 - 31, 229, pending  - EKG - sinus tachycardia    5/30-cardiology recs pending.     Primary hypertension  - Patient hypotensive on admission  - Holding home medication      ESRD (end stage renal disease)  - Patient reportedly followed by Dr. Aceves  - Dialysis schedule Tuesday, Thursday, Saturday  - Nephrology consulted, appreciate assistance  - Last dialysis Saturday 5/27/2023        VTE Risk Mitigation (From admission, onward)         Ordered     heparin (porcine) injection 4,000 Units  As needed (PRN)         05/31/23 1340     Reason for No Pharmacological VTE Prophylaxis  Once        Question:  Reasons:  Answer:  Active Bleeding    05/29/23 0154     IP VTE HIGH RISK PATIENT  Once         05/29/23 0154     Place sequential compression device  Until discontinued         05/29/23 0154                Discharge Planning   RASHAD:      Code Status: Full Code   Is the patient medically ready for discharge?:     Reason for patient still in hospital (select all that apply): Treatment  Discharge Plan A: Return to nursing home                  Rehmat NATALI Alba MD  Department of Hospital Medicine   [facility] - [facility] Tele[facility]

## 2023-06-04 NOTE — PROGRESS NOTES
Patient is without complaints    Physical exam general patient chronically ill-appearing, heart is regular rate and rhythm, he has no pitting edema, lungs are clear to auscultation anteriorly, abdomen is soft with positive bowel sounds    Assessment/plan 1.  ESRD- Continue HD support  2.  Hyperkalemia-this has resolved, his potassium is 4.7 today  3. Hyperlipidemia  4.  Anemia-patient's hematocrit is around 26 %, continue EPO  5.  HTN-this is controlled      Vitals:    06/04/23 0000 06/04/23 0400 06/04/23 0800 06/04/23 1105   BP: 138/72 (!) 150/72 (!) 142/73 114/67   Pulse: (!) 59 60 62 88   Resp: 18 18 19 20   Temp: 97.6 °F (36.4 °C) 98.4 °F (36.9 °C) 97.6 °F (36.4 °C) 97.6 °F (36.4 °C)   TempSrc:       SpO2: 96% 96% 97% 98%   Weight:       Height:

## 2023-06-05 LAB
ANION GAP SERPL CALCULATED.3IONS-SCNC: 23 MMOL/L (ref 7–16)
BASOPHILS # BLD AUTO: 0.02 K/UL (ref 0–0.2)
BASOPHILS NFR BLD AUTO: 0.3 % (ref 0–1)
BUN SERPL-MCNC: 86 MG/DL (ref 7–18)
BUN/CREAT SERPL: 10 (ref 6–20)
CALCIUM SERPL-MCNC: 8.7 MG/DL (ref 8.5–10.1)
CHLORIDE SERPL-SCNC: 100 MMOL/L (ref 98–107)
CO2 SERPL-SCNC: 25 MMOL/L (ref 21–32)
CREAT SERPL-MCNC: 8.63 MG/DL (ref 0.7–1.3)
DIFFERENTIAL METHOD BLD: ABNORMAL
EGFR (NO RACE VARIABLE) (RUSH/TITUS): 6 ML/MIN/1.73M2
EOSINOPHIL # BLD AUTO: 0.18 K/UL (ref 0–0.5)
EOSINOPHIL NFR BLD AUTO: 2.4 % (ref 1–4)
ERYTHROCYTE [DISTWIDTH] IN BLOOD BY AUTOMATED COUNT: 13.7 % (ref 11.5–14.5)
GLUCOSE SERPL-MCNC: 102 MG/DL (ref 74–106)
GLUCOSE SERPL-MCNC: 119 MG/DL (ref 70–105)
GLUCOSE SERPL-MCNC: 142 MG/DL (ref 70–105)
GLUCOSE SERPL-MCNC: 168 MG/DL (ref 70–105)
HCT VFR BLD AUTO: 25.5 % (ref 40–54)
HGB BLD-MCNC: 7.9 G/DL (ref 13.5–18)
IMM GRANULOCYTES # BLD AUTO: 0.04 K/UL (ref 0–0.04)
IMM GRANULOCYTES NFR BLD: 0.5 % (ref 0–0.4)
LYMPHOCYTES # BLD AUTO: 0.93 K/UL (ref 1–4.8)
LYMPHOCYTES NFR BLD AUTO: 12.5 % (ref 27–41)
MCH RBC QN AUTO: 30.2 PG (ref 27–31)
MCHC RBC AUTO-ENTMCNC: 31 G/DL (ref 32–36)
MCV RBC AUTO: 97.3 FL (ref 80–96)
MONOCYTES # BLD AUTO: 0.52 K/UL (ref 0–0.8)
MONOCYTES NFR BLD AUTO: 7 % (ref 2–6)
MPC BLD CALC-MCNC: 12.5 FL (ref 9.4–12.4)
NEUTROPHILS # BLD AUTO: 5.74 K/UL (ref 1.8–7.7)
NEUTROPHILS NFR BLD AUTO: 77.3 % (ref 53–65)
NRBC # BLD AUTO: 0 X10E3/UL
NRBC, AUTO (.00): 0 %
PLATELET # BLD AUTO: 142 K/UL (ref 150–400)
POTASSIUM SERPL-SCNC: 4.9 MMOL/L (ref 3.5–5.1)
RBC # BLD AUTO: 2.62 M/UL (ref 4.6–6.2)
SODIUM SERPL-SCNC: 143 MMOL/L (ref 136–145)
WBC # BLD AUTO: 7.43 K/UL (ref 4.5–11)

## 2023-06-05 PROCEDURE — 80048 BASIC METABOLIC PNL TOTAL CA: CPT

## 2023-06-05 PROCEDURE — 97165 OT EVAL LOW COMPLEX 30 MIN: CPT

## 2023-06-05 PROCEDURE — 25000003 PHARM REV CODE 250

## 2023-06-05 PROCEDURE — 90935 HEMODIALYSIS ONE EVALUATION: CPT

## 2023-06-05 PROCEDURE — 82962 GLUCOSE BLOOD TEST: CPT

## 2023-06-05 PROCEDURE — 63600175 PHARM REV CODE 636 W HCPCS: Mod: JZ,JG | Performed by: INTERNAL MEDICINE

## 2023-06-05 PROCEDURE — 25000003 PHARM REV CODE 250: Performed by: INTERNAL MEDICINE

## 2023-06-05 PROCEDURE — 11000001 HC ACUTE MED/SURG PRIVATE ROOM

## 2023-06-05 PROCEDURE — 97161 PT EVAL LOW COMPLEX 20 MIN: CPT

## 2023-06-05 PROCEDURE — 94761 N-INVAS EAR/PLS OXIMETRY MLT: CPT

## 2023-06-05 PROCEDURE — 85025 COMPLETE CBC W/AUTO DIFF WBC: CPT

## 2023-06-05 PROCEDURE — 99232 SBSQ HOSP IP/OBS MODERATE 35: CPT | Mod: ,,, | Performed by: INTERNAL MEDICINE

## 2023-06-05 PROCEDURE — 99232 PR SUBSEQUENT HOSPITAL CARE,LEVL II: ICD-10-PCS | Mod: ,,, | Performed by: INTERNAL MEDICINE

## 2023-06-05 RX ADMIN — ATORVASTATIN CALCIUM 80 MG: 80 TABLET, FILM COATED ORAL at 10:06

## 2023-06-05 RX ADMIN — HEPARIN SODIUM 4000 UNITS: 1000 INJECTION, SOLUTION INTRAVENOUS; SUBCUTANEOUS at 10:06

## 2023-06-05 RX ADMIN — EPOETIN ALFA-EPBX 10000 UNITS: 10000 INJECTION, SOLUTION INTRAVENOUS; SUBCUTANEOUS at 10:06

## 2023-06-05 RX ADMIN — SODIUM CHLORIDE: 9 INJECTION, SOLUTION INTRAVENOUS at 09:06

## 2023-06-05 RX ADMIN — ASPIRIN 81 MG 81 MG: 81 TABLET ORAL at 12:06

## 2023-06-05 RX ADMIN — MUPIROCIN: 20 OINTMENT TOPICAL at 12:06

## 2023-06-05 NOTE — PROGRESS NOTES
Patient is seen in hemodialysis, they are tolerating this well, we will continue their treatment unchanged.Patient is without complaints    Physical exam general patient chronically ill-appearing, heart is regular rate and rhythm, he has no pitting edema, lungs are clear to auscultation anteriorly, abdomen is soft with positive bowel sounds    Assessment/plan 1.  ESRD- Continue HD support  2.  Hyperkalemia-this has resolved  3. Hyperlipidemia  4.  Anemia-patient's hematocrit is around 26 %, continue EPO  5.  HTN-this is controlled      Vitals:    06/05/23 0930 06/05/23 1000 06/05/23 1030 06/05/23 1100   BP: (!) 146/62 100/67 (!) 107/49 (!) 105/44   Pulse: 73 93 (!) 53 79   Resp: 18 18 18 18   Temp:       TempSrc:       SpO2:       Weight:       Height:

## 2023-06-05 NOTE — PROGRESS NOTES
[facility] - [address] [place] [address] Medicine  Progress Note    Patient Name: Pardeep Collins  MRN: [id number]  Patient Class: IP- Inpatient   Admission Date: 5/28/2023  Length of Stay: 7 days  Attending Physician: Rodrigo Alba MD  Primary Care Provider: Provider Not[employer]ystem        Subjective:     Principal Problem:Hemorrhage of arteriovenous fistula        HPI:  Patient is an 82 year old male that was brought to [facility] via EMS for hemorrhage from AV fistula site. The patient has a PMHx of ESRD, HTN and CVA. The patient resides at [facility] and was reported to be bleeding from AV fistula site in right arm. It was reported that the patient had large amount of blood loss from the hemorrhage (photos in media). A pressure dressing was placed and the bleeding stopped. The patient has difficulty communicating verbally due to previous CVA but does nod in response to questions. The patient appears to deny any pain and does not appear in distress.    In ED, patient was noted to have /89 and this dropped to 70s systolic. In the ED the patient received 2L lactated ringers and 2 units pRBCs with initial hemoglobin of 6.5, improving to 8.8. The patient reported no chest pain, however troponin level was checked x2 with 31 and an increase to 229 seen.      Per chart review, the patient sees Dr. Aceves as his Nephrologist and had his AV fistula (Right arm) placed by Dr. Sierra on 3/20/23.  The patient has dialysis scheduled Tuesday, Thursday and Saturday. Last dialysis was Saturday 5/27/2023.     The patient will be admitted to [facility] for continued care and medical management.       Overview/Hospital Course:  No notes on file    Interval History:     NAEO  H/h stable realtively as he is on asa for two days now  Pt seen in HD session, seems to be at his BL, howeverh is bp is running on the lower side 100s/50s , per HD nurse they will only do clearance, pt seems to be asymptomatic.    Pt/ot ordered for dispo.       Review of Systems   Unable to perform ROS: Patient nonverbal pt answeres yes and no w/ nodding and tries to explain with hands too, garrick any pain , sob or CP.   Objective:     Vital Signs (Most Recent):  Temp: 97.5 °F (36.4 °C) (06/05/23 0849)  Pulse: 79 (06/05/23 1100)  Resp: 18 (06/05/23 1100)  BP: (!) 105/44 (06/05/23 1100)  SpO2: 97 % (06/05/23 0804) Vital Signs (24h Range):  Temp:  [96.6 °F (35.9 °C)-98 °F (36.7 °C)] 97.5 °F (36.4 °C)  Pulse:  [] 79  Resp:  [18-19] 18  SpO2:  [95 %-99 %] 97 %  BP: (100-171)/(44-89) 105/44     Weight: 55.3 kg (122 lb)  Body mass index is 18.55 kg/m².  No intake or output data in the 24 hours ending 06/05/23 1151        Physical Exam  Vitals reviewed.   Constitutional:       General: He is not in acute distress.     Appearance: He is not toxic-appearing.   HENT:      Head: Normocephalic and atraumatic.      Nose: Nose normal.      Mouth/Throat:      Mouth: Mucous membranes are moist.      Pharynx: Oropharynx is clear.   Eyes:      General: No scleral icterus.     Conjunctiva/sclera: Conjunctivae normal.      Pupils: Pupils are equal, round, and reactive to light.   Cardiovascular:      Rate and Rhythm: Normal rate and regular rhythm.   Pulmonary:      Effort: No respiratory distress.      Breath sounds: Normal breath sounds.   Abdominal:      General: Bowel sounds are normal.      Palpations: Abdomen is soft. There is no mass.   Musculoskeletal:         General: No swelling or tenderness.      Cervical back: Normal range of motion and neck supple.   Lymphadenopathy:      Cervical: No cervical adenopathy.   Skin:     General: Skin is warm and dry.      Findings: No erythema.   Neurological:      Mental Status: Mental status is at baseline.      Comments: Patient was nonverbal, he did follow commands, he has some weakness involving his right side.   Psychiatric:         Mood and Affect: Mood normal.         Behavior: Behavior normal.            Significant Labs: All pertinent labs within the past 24 hours have been reviewed.    Significant Imaging: I have reviewed all pertinent imaging results/findings within the past 24 hours.      Assessment/Plan:      * Hemorrhage of arteriovenous fistula  - R arm bleeding while at nursing home 5/28/2023  - Bleeding stopped in ED with pressure dressing  - Successful dialysis last reported on 5/27/2023  - Holding home aspirin  - General surgery consulted, appreciate assistance  - AV fistula (Right arm) placed by Dr. Sierra on 3/20/23.  - Initial H/H 6.5/21, after 2 units of pRBCs H/H 8.8/25.9    Anemia  - Initial hemoglobin in ED 6.5, after 2 units pRBCs hemoglobin 8.8  - Will continue to monitor      Type 2 MI (myocardial infarction)  - Likely 2/2 to acute blood loss anemia  - Troponin x3 - 31, 229, pending  - EKG - sinus tachycardia    5/30-cardiology recs pending.     Primary hypertension  - Patient hypotensive on admission  - Holding home medication      ESRD (end stage renal disease)  - Patient reportedly followed by Dr. Aceves  - Dialysis schedule Tuesday, Thursday, Saturday  - Nephrology consulted, appreciate assistance  - Last dialysis Saturday 5/27/2023        VTE Risk Mitigation (From admission, onward)         Ordered     heparin (porcine) injection 4,000 Units  As needed (PRN)         05/31/23 1340     Reason for No Pharmacological VTE Prophylaxis  Once        Question:  Reasons:  Answer:  Active Bleeding    05/29/23 0154     IP VTE HIGH RISK PATIENT  Once         05/29/23 0154     Place sequential compression device  Until discontinued         05/29/23 0154                Discharge Planning   RASHAD:      Code Status: Full Code   Is the patient medically ready for discharge?:     Reason for patient still in hospital (select all that apply): Treatment  Discharge Plan A: Return to nursing home                  Rehmat NATALI Alba MD  Department of Hospital Medicine   [facility] - [facility]

## 2023-06-05 NOTE — SUBJECTIVE & OBJECTIVE
Interval History:     NAEO  H/h stable realtively as he is on asa for two days now  Pt seen in HD session, seems to be at his BL, howeverh is bp is running on the lower side 100s/50s , per HD nurse they will only do clearance, pt seems to be asymptomatic.   Pt/ot ordered for dispo.       Review of Systems   Unable to perform ROS: Patient nonverbal pt answeres yes and no w/ nodding and tries to explain with hands too, garrick any pain , sob or CP.   Objective:     Vital Signs (Most Recent):  Temp: 97.5 °F (36.4 °C) (06/05/23 0849)  Pulse: 79 (06/05/23 1100)  Resp: 18 (06/05/23 1100)  BP: (!) 105/44 (06/05/23 1100)  SpO2: 97 % (06/05/23 0804) Vital Signs (24h Range):  Temp:  [96.6 °F (35.9 °C)-98 °F (36.7 °C)] 97.5 °F (36.4 °C)  Pulse:  [] 79  Resp:  [18-19] 18  SpO2:  [95 %-99 %] 97 %  BP: (100-171)/(44-89) 105/44     Weight: 55.3 kg (122 lb)  Body mass index is 18.55 kg/m².  No intake or output data in the 24 hours ending 06/05/23 1151        Physical Exam  Vitals reviewed.   Constitutional:       General: He is not in acute distress.     Appearance: He is not toxic-appearing.   HENT:      Head: Normocephalic and atraumatic.      Nose: Nose normal.      Mouth/Throat:      Mouth: Mucous membranes are moist.      Pharynx: Oropharynx is clear.   Eyes:      General: No scleral icterus.     Conjunctiva/sclera: Conjunctivae normal.      Pupils: Pupils are equal, round, and reactive to light.   Cardiovascular:      Rate and Rhythm: Normal rate and regular rhythm.   Pulmonary:      Effort: No respiratory distress.      Breath sounds: Normal breath sounds.   Abdominal:      General: Bowel sounds are normal.      Palpations: Abdomen is soft. There is no mass.   Musculoskeletal:         General: No swelling or tenderness.      Cervical back: Normal range of motion and neck supple.   Lymphadenopathy:      Cervical: No cervical adenopathy.   Skin:     General: Skin is warm and dry.      Findings: No erythema.   Neurological:       Mental Status: Mental status is at baseline.      Comments: Patient was nonverbal, he did follow commands, he has some weakness involving his right side.   Psychiatric:         Mood and Affect: Mood normal.         Behavior: Behavior normal.           Significant Labs: All pertinent labs within the past 24 hours have been reviewed.    Significant Imaging: I have reviewed all pertinent imaging results/findings within the past 24 hours.

## 2023-06-05 NOTE — PLAN OF CARE
Problem: Physical Therapy  Goal: Physical Therapy Goal  Description: Short Term Goals to be met by 6/20/2023    Patient will increase functional independence and safety with mobility by performing    1.   Rolling right independently; roll left Minimal Assist  2.   Supine to sit Minimal Assist  3.   Sitting balance edge of the bed x 15 minutes Stand by assist  4.   Sit to stand Minimal Assist  using manual assistance with gait belt  5.   Bed to chair Minimal Assist using manual assistance with gait belt  6.   Lower extremity exercises x 30 reps with assist as necessary and no rest breaks      Long Term Goals to be met by 7/5/2023    Patient to require less than or equal to Minimal Assist for functional mobility to ease caregiver burden   Outcome: Ongoing, Progressing       Patient hospitalized from the nursing home with bleeding from his AV fistula. Patient has been participating with PT/OT at the nursing home due to a recent CVA. Patient was ambulatory when in the hospital in April prior to CVA. Patient demonstrates significant balance/strength deficits post CVA and will benefit from skilled PT to optimize neuro-recovery.  Anticipate return to NH/Swing bed once medically stable.

## 2023-06-05 NOTE — PROGRESS NOTES
[facility] - [address] [place] [address]  Adult Nutrition  Follow up note         Reason for Assessment  Reason For Assessment: RD follow-up   Nutrition Risk Screen: no indicators present     RD follow ups. Patient on a puree diet with ESRD and h/o stroke.   Recommend continue Nepro TID due to low body weight, ESRD and wounds. Recommend continue Asael to aide in wound healing.   Recommend addition of renal high protein diet due to ESRD with dialysis. Extra protein needed.     Per MD notes:  HPI: Patient is an 82 year old male that was brought to [facility] via EMS for hemorrhage from AV fistula site. The patient has a PMHx of ESRD, HTN and CVA. The patient resides at [facility] and was reported to be bleeding from AV fistula site in right arm. It was reported that the patient had large amount of blood loss from the hemorrhage (photos in media). A pressure dressing was placed and the bleeding stopped. The patient has difficulty communicating verbally due to previous CVA but does nod in response to questions. The patient appears to deny any pain and does not appear in distress.  5/31:Interval History:       6/4: NAEO  H/h stable now on asa   Pending placement.       Malnutrition  Is Patient Malnourished: No  Skin Integrity  Randy Risk Assessment  Sensory Perception: 3-->slightly limited  Moisture: 4-->rarely moist  Activity: 1-->bedfast  Mobility: 3-->slightly limited  Nutrition: 3-->adequate  Friction and Shear: 2-->potential problem  Randy Score: 16  Comments on skin integrity: altered skin  Nutrition Diagnosis  Increased protein Needs   related to Wound healing as evidenced by wounds    Nutrition Diagnosis Status: Chronic/ continues        Nutrition Risk  Level of Risk/Frequency of Follow-up: moderate - high     Recent Labs   Lab 06/05/23  0419 06/05/23  0622   GLU  --  102   POCGLU 119*  --        Comments on Glucose: elevated with stress response  Nutrition Prescription /  Recommendations  Recommendation/Intervention: Recommend continue with puree diet. Continue with Nepro TID + Asael BID. Renal labs monitored. K WNL. Suggest addition of renal high protein diet due to ESRD with dialysis  Goals: weight maintenance, intake %, acceptance of supplements  Nutrition Goal Status: progressing towards goal  Current Diet Order: puree  Oral Nutrition Supplement: Nepro TID + Asale BID  Chewing or Swallowing Difficulty?: No Chewing or swallowing difficulty  Recommended Diet: Renal (High Protein)  puree  Recommended Oral Supplement: Nepro [420 kcals, 19g Protein, 38g Carbs(6g Fiber, 8g Sugar), 23g Fat] three times daily  Is Nutrition Support Recommended: No  Is Education Recommended: No  Monitor and Evaluation  % current Intake: Unknown/ No P.O. intake documented  % intake to meet estimated needs: 75 - 100 %  Food and Nutrient Intake: energy intake  Food and Nutrient Adminstration: diet order  Anthropometric Measurements: weight, weight change, body mass index  Biochemical Data, Medical Tests and Procedures: electrolyte and renal panel, lipid profile, gastrointestinal profile, glucose/endocrine profile, inflammatory profile  Nutrition-Focused Physical Findings: overall appearance, extremities, muscles and bones, head and eyes, skin     Current Medical Diagnosis and Past Medical History     Past Medical History:   Diagnosis Date    CVA (cerebral vascular accident)     Dialysis patient     Elevated PSA     Gout, unspecified     Hypertension     Renal disorder     Rheumatoid arthritis, unspecified      Nutrition/Diet History  Spiritual, Cultural Beliefs, Advent Practices, Values that Affect Care: no  Food Allergies: NKFA  Factors Affecting Nutritional Intake: None identified at this time  Lab/Procedures/Meds  Recent Labs   Lab 06/05/23  0622      K 4.9   BUN 86*   CREATININE 8.63*   CALCIUM 8.7        Note: BUN and crea elevated due to ESRD  Last A1c: No results found for:  "HGBA1C  Lab Results   Component Value Date    RBC 2.62 (L) 06/05/2023    HGB 7.9 (L) 06/05/2023    HCT 25.5 (L) 06/05/2023    MCV 97.3 (H) 06/05/2023    MCH 30.2 06/05/2023    MCHC 31.0 (L) 06/05/2023    TIBC 171 (L) 05/12/2023     Pertinent Labs Reviewed: reviewed  Pertinent Labs Comments: Sodium: 135 (L)  Potassium: 4.9  Chloride: 98  CO2: 27  Anion Gap: 15  BUN: 55 (H)  Creatinine: 6.61 (H)  BUN/CREAT RATIO: 8  eGFR: 8 (L)  Glucose: 179 (H)  Calcium: 8.7      (L): Data is abnormally low  (H): Data is abnormally high  Pertinent Medications Reviewed: reviewed  Pertinent Medications Comments: atrovastatin  Anthropometrics  Temp: 98 °F (36.7 °C)  Height: 5' 8" (172.7 cm)  Height (inches): 68 in  Weight Method: Estimated  Weight: 55.3 kg (122 lb)  Weight (lb): 122 lb  Ideal Body Weight (IBW), Male: 154 lb  % Ideal Body Weight, Male (lb): 79.22 %  BMI (Calculated): 18.6     Estimated/Assessed Needs  RMR (McLennan-St. Jeor Equation): 1237.89     Temp: 98 °F (36.7 °C)Oral  Weight Used For Calorie Calculations: 55.3 kg (121 lb 14.6 oz)   Energy Need Method: Kcal/kg Energy Calorie Requirements (kcal): 4788-7607  Weight Used For Protein Calculations: 55.3 kg (121 lb 14.6 oz)  Protein Requirements: 66-76  Estimated Fluid Requirement Method: RDA Method    RDA Method (mL): 1382     Nutrition by Nursing     Intake (%): 50%        Last Bowel Movement: 06/04/23              Nutrition Follow-Up  RD Follow-up?: Yes     "

## 2023-06-05 NOTE — PLAN OF CARE
Updates faxed to jorge at [facility], spoke with jorge and insurance needs PT/OT sara, notified Dr. Alba, will fax once available, insurance pending

## 2023-06-05 NOTE — NURSING
Hemodialysis treatment completed. Hypotension noted at various times during treatment. Total net UF removal 1000ml. /47, HR 66, R18, T 97.6. Epogen 10,000 units given IVP during hemodialysis.

## 2023-06-05 NOTE — PT/OT/SLP EVAL
Physical Therapy Evaluation and Treatment    Patient Name: Pardeep Collins   MRN: [id number]  Recent Surgery: Procedure(s) (LRB):  INSERTION, CATHETER, HEMODIALYSIS, DUAL LUMEN (N/A) 5 Days Post-Op    Recommendations:     Discharge Recommendations: nursing facility, skilled   Discharge Equipment Recommendations: other (see comments) (to be determined)   Barriers to discharge:  awaiting insurance approval to return to nursing home/swing bed    Assessment:     Pardeep Collins is a 82 y.o. male admitted with a medical diagnosis of Hemorrhage of arteriovenous fistula. Recent CVA. He presents with the following impairments/functional limitations: weakness, impaired endurance, impaired self care skills, impaired functional mobility, impaired balance, impaired cognition, decreased upper extremity function, decreased lower extremity function, abnormal tone.     Patient hospitalized from the nursing home/swing bed with bleeding from his AV fistula. Patient has been participating with PT/OT at the nursing home due to a recent CVA. Patient was ambulatory when in the hospital in April prior to CVA. Patient demonstrates significant balance/strength deficits post CVA and will benefit from skilled PT to optimize neuro-recovery.  Anticipate return to NH/Swing bed once medically stable.    Rehab Prognosis: Good; patient would benefit from acute PT services to address these deficits and reach maximum level of function.    Plan:     During this hospitalization, patient to be seen 5 x/week to address the above listed problems via therapeutic activities, therapeutic exercises, neuromuscular re-education, gait training    Plan of Care Expires: 07/05/23    Subjective     Chief Complaint: hemorrhage of AV fistula; recent CVA  Patient Comments/Goals: Patient dysphasic with unintelligible speech but can nod head yes/no inconsistently  Pain/Comfort:  Pain Rating 1: 0/10  Pain Rating Post-Intervention 1: 0/10    Social History:  Living Environment:  Patient lives in a nursing home   Prior Level of Function: Prior to admission, patient  has been participating with PT/OT in NH/swing bed since CVA. Patient was ambulatory prior to CVA  Equipment Used at Home: cane, straight, walker, rolling  DME owned (not currently used): none  Assistance Upon Discharge: facility staff    Objective:     Communicated with Carlos Conroy LPN prior to session. Patient found supine with peripheral IV, bed alarm upon PT entry to room.    General Precautions: Standard, fall   Orthopedic Precautions: N/A   Braces: N/A    Respiratory Status: Room air    Exams:  Cognitive Exam: Patient is oriented to Person, follows commands 75% of the time  RLE ROM: WFL  RLE Strength:  hip 3/5; knee 3-/5; ankle 0/5  LLE ROM: WFL  LLE Strength:  4/5  Sensation: patient appeared aware of all 4 limbs but unable to participate with selective testing due to dysphasia   Coordination: Heel to Shin: unable to perform LLE due to poor muscle recruitment  Tone: hypotonic right UE; right foot/ankle    Functional Mobility:  Gait belt applied - Yes  Bed Mobility  Rolling Left: moderate assistance  Rolling Right: minimum assistance  Supine to Sit: maximal assistance and of 2 persons for LE management and trunk management  Sit to Supine: maximal assistance and of 2 persons for LE management and trunk management  Transfers  Sit to Stand: moderate assistance with manual assist via gait belt and with cues for sequencing/safety  Gait  Patient unable to take steps  Balance:  Static Sitting: moderate assistance at EOB x 5 minutes  Poor: Patient requires handhold support and moderate to maximal assistance to maintain position.  Dynamic Sitting: dependence  at EOB x 2 minutes  Poor: Patient unable to accept challenge or move without loss of balance.  Static Standing: moderate assistance with  manual assist via gait belt  Poor: Patient requires handhold support and moderate to maximal assistance to maintain position  Dynamic  Standing: moderate assistance to maximal assistance with  manual assist via gait belt  Poor: Patient unable to accept challenge or move without loss of balance.      Therapeutic Activities and Exercises:  Patient educated on role of acute care PT and PT POC, safety while in hospital including calling nurse for mobility, and call light usage.  Educated about importance of OOB mobility and remaining up in chair most of the day.      AM-PAC 6 CLICK MOBILITY  Turning over in bed (including adjusting bedclothes, sheets and blankets)?: 2  Sitting down on and standing up from a chair with arms (e.g., wheelchair, bedside commode, etc.): 2  Moving from lying on back to sitting on the side of the bed?: 2  Moving to and from a bed to a chair (including a wheelchair)?: 2  Need to walk in hospital room?: 1  Climbing 3-5 steps with a railing?: 1  Basic Mobility Total Score: 10     Patient left right sidelying with HOB elevated with all lines intact, call button in reach, RN notified, and bed alarm on.    GOALS:   Multidisciplinary Problems       Physical Therapy Goals          Problem: Physical Therapy    Goal Priority Disciplines Outcome Goal Variances Interventions   Physical Therapy Goal     PT, PT/OT Ongoing, Progressing     Description: Short Term Goals to be met by 6/20/2023    Patient will increase functional independence and safety with mobility by performing    1.   Rolling right independently; roll left Minimal Assist  2.   Supine to sit Minimal Assist  3.   Sitting balance edge of the bed x 15 minutes Stand by assist  4.   Sit to stand Minimal Assist  using manual assistance with gait belt  5.   Bed to chair Minimal Assist using manual assistance with gait belt  6.   Lower extremity exercises x 30 reps with assist as necessary and no rest breaks      Long Term Goals to be met by 7/5/2023    Patient to require less than or equal to Minimal Assist for functional mobility to ease caregiver burden                         History:     Past Medical History:   Diagnosis Date    CVA (cerebral vascular accident)     Dialysis patient     Elevated PSA     Gout, unspecified     Hypertension     Renal disorder     Rheumatoid arthritis, unspecified        Past Surgical History:   Procedure Laterality Date    AV FISTULA PLACEMENT Right 3/20/2023    Procedure: CREATION, AV FISTULA;  Surgeon: Alfred Sierra MD;  Location: [facility];  Service: General;  Laterality: Right;  right basilic vein transposition    HAND SURGERY Left     INSERTION OF TUNNELED CENTRAL VENOUS HEMODIALYSIS CATHETER N/A 5/31/2023    Procedure: INSERTION, CATHETER, HEMODIALYSIS, DUAL LUMEN;  Surgeon: Alfred Sierra MD;  Location: [facility];  Service: General;  Laterality: N/A;    urolift      in [place];       Time Tracking:     PT Received On: 06/05/23  PT Start Time: 1352  PT Stop Time: 1402  PT Total Time (min): 10 min     Billable Minutes: Evaluation Low complexity    6/5/2023

## 2023-06-06 VITALS
RESPIRATION RATE: 19 BRPM | SYSTOLIC BLOOD PRESSURE: 129 MMHG | HEART RATE: 80 BPM | HEIGHT: 68 IN | BODY MASS INDEX: 18.49 KG/M2 | DIASTOLIC BLOOD PRESSURE: 65 MMHG | WEIGHT: 122 LBS | TEMPERATURE: 98 F | OXYGEN SATURATION: 98 %

## 2023-06-06 LAB
ANION GAP SERPL CALCULATED.3IONS-SCNC: 16 MMOL/L (ref 7–16)
BASOPHILS # BLD AUTO: 0.03 K/UL (ref 0–0.2)
BASOPHILS NFR BLD AUTO: 0.3 % (ref 0–1)
BUN SERPL-MCNC: 49 MG/DL (ref 7–18)
BUN/CREAT SERPL: 10 (ref 6–20)
CALCIUM SERPL-MCNC: 8.8 MG/DL (ref 8.5–10.1)
CHLORIDE SERPL-SCNC: 102 MMOL/L (ref 98–107)
CO2 SERPL-SCNC: 26 MMOL/L (ref 21–32)
CREAT SERPL-MCNC: 5 MG/DL (ref 0.7–1.3)
DIFFERENTIAL METHOD BLD: ABNORMAL
EGFR (NO RACE VARIABLE) (RUSH/TITUS): 11 ML/MIN/1.73M2
EOSINOPHIL # BLD AUTO: 0.07 K/UL (ref 0–0.5)
EOSINOPHIL NFR BLD AUTO: 0.6 % (ref 1–4)
ERYTHROCYTE [DISTWIDTH] IN BLOOD BY AUTOMATED COUNT: 13.7 % (ref 11.5–14.5)
GLUCOSE SERPL-MCNC: 143 MG/DL (ref 70–105)
GLUCOSE SERPL-MCNC: 145 MG/DL (ref 74–106)
GLUCOSE SERPL-MCNC: 182 MG/DL (ref 70–105)
HCT VFR BLD AUTO: 27.6 % (ref 40–54)
HGB BLD-MCNC: 8.7 G/DL (ref 13.5–18)
IMM GRANULOCYTES # BLD AUTO: 0.07 K/UL (ref 0–0.04)
IMM GRANULOCYTES NFR BLD: 0.6 % (ref 0–0.4)
LYMPHOCYTES # BLD AUTO: 0.65 K/UL (ref 1–4.8)
LYMPHOCYTES NFR BLD AUTO: 5.9 % (ref 27–41)
LYMPHOCYTES NFR BLD MANUAL: 6 % (ref 27–41)
MCH RBC QN AUTO: 29.7 PG (ref 27–31)
MCHC RBC AUTO-ENTMCNC: 31.5 G/DL (ref 32–36)
MCV RBC AUTO: 94.2 FL (ref 80–96)
MONOCYTES # BLD AUTO: 0.56 K/UL (ref 0–0.8)
MONOCYTES NFR BLD AUTO: 5.1 % (ref 2–6)
MONOCYTES NFR BLD MANUAL: 5 % (ref 2–6)
MPC BLD CALC-MCNC: 12.2 FL (ref 9.4–12.4)
NEUTROPHILS # BLD AUTO: 9.63 K/UL (ref 1.8–7.7)
NEUTROPHILS NFR BLD AUTO: 87.5 % (ref 53–65)
NEUTS BAND NFR BLD MANUAL: 4 % (ref 1–5)
NEUTS SEG NFR BLD MANUAL: 85 % (ref 50–62)
NRBC # BLD AUTO: 0 X10E3/UL
NRBC, AUTO (.00): 0 %
OVALOCYTES BLD QL SMEAR: ABNORMAL
PLATELET # BLD AUTO: 191 K/UL (ref 150–400)
PLATELET MORPHOLOGY: ABNORMAL
POTASSIUM SERPL-SCNC: 4.5 MMOL/L (ref 3.5–5.1)
RBC # BLD AUTO: 2.93 M/UL (ref 4.6–6.2)
SODIUM SERPL-SCNC: 139 MMOL/L (ref 136–145)
WBC # BLD AUTO: 11.01 K/UL (ref 4.5–11)

## 2023-06-06 PROCEDURE — 25000003 PHARM REV CODE 250: Performed by: INTERNAL MEDICINE

## 2023-06-06 PROCEDURE — 99239 HOSP IP/OBS DSCHRG MGMT >30: CPT | Mod: ,,, | Performed by: INTERNAL MEDICINE

## 2023-06-06 PROCEDURE — 63600175 PHARM REV CODE 636 W HCPCS: Performed by: HOSPITALIST

## 2023-06-06 PROCEDURE — 80048 BASIC METABOLIC PNL TOTAL CA: CPT

## 2023-06-06 PROCEDURE — 82962 GLUCOSE BLOOD TEST: CPT

## 2023-06-06 PROCEDURE — 99239 PR HOSPITAL DISCHARGE DAY,>30 MIN: ICD-10-PCS | Mod: ,,, | Performed by: INTERNAL MEDICINE

## 2023-06-06 PROCEDURE — 94761 N-INVAS EAR/PLS OXIMETRY MLT: CPT

## 2023-06-06 PROCEDURE — 85025 COMPLETE CBC W/AUTO DIFF WBC: CPT

## 2023-06-06 RX ORDER — BISACODYL 5 MG
10 TABLET, DELAYED RELEASE (ENTERIC COATED) ORAL DAILY PRN
Status: DISCONTINUED | OUTPATIENT
Start: 2023-06-06 | End: 2023-06-06 | Stop reason: HOSPADM

## 2023-06-06 RX ORDER — ACETAMINOPHEN 500 MG
1000 TABLET ORAL EVERY 6 HOURS PRN
Status: DISCONTINUED | OUTPATIENT
Start: 2023-06-06 | End: 2023-06-06 | Stop reason: HOSPADM

## 2023-06-06 RX ORDER — SIMETHICONE 80 MG
1 TABLET,CHEWABLE ORAL 3 TIMES DAILY PRN
Status: DISCONTINUED | OUTPATIENT
Start: 2023-06-06 | End: 2023-06-06 | Stop reason: HOSPADM

## 2023-06-06 RX ORDER — ONDANSETRON 2 MG/ML
8 INJECTION INTRAMUSCULAR; INTRAVENOUS EVERY 6 HOURS PRN
Status: DISCONTINUED | OUTPATIENT
Start: 2023-06-06 | End: 2023-06-06 | Stop reason: HOSPADM

## 2023-06-06 RX ORDER — TRAZODONE HYDROCHLORIDE 50 MG/1
50 TABLET ORAL NIGHTLY PRN
Status: DISCONTINUED | OUTPATIENT
Start: 2023-06-06 | End: 2023-06-06 | Stop reason: HOSPADM

## 2023-06-06 RX ORDER — GUAIFENESIN/DEXTROMETHORPHAN 100-10MG/5
10 SYRUP ORAL EVERY 6 HOURS PRN
Status: DISCONTINUED | OUTPATIENT
Start: 2023-06-06 | End: 2023-06-06 | Stop reason: HOSPADM

## 2023-06-06 RX ADMIN — ONDANSETRON HYDROCHLORIDE 8 MG: 2 SOLUTION INTRAMUSCULAR; INTRAVENOUS at 01:06

## 2023-06-06 RX ADMIN — ASPIRIN 81 MG 81 MG: 81 TABLET ORAL at 09:06

## 2023-06-06 NOTE — DISCHARGE SUMMARY
[facility] - [address] [place] [address] Medicine  Discharge Summary      Patient Name: Pardeep Collins  MRN: [id number]  KIERA: [id number]  Patient Class: IP- Inpatient  Admission Date: 5/28/2023  Hospital Length of Stay: 8 days  Discharge Date and Time:  06/06/2023 9:43 AM  Attending Physician: Alexandre Kahn MD   Discharging Provider: Alexandre Kahn MD  Primary Care Provider: Provider [facility]    Primary Care Team: Networked reference to record PCT     HPI:   Patient is an 82 year old male that was brought to [facility] via EMS for hemorrhage from AV fistula site. The patient has a PMHx of ESRD, HTN and CVA. The patient resides at [facility] and was reported to be bleeding from AV fistula site in right arm. It was reported that the patient had large amount of blood loss from the hemorrhage (photos in media). A pressure dressing was placed and the bleeding stopped. The patient has difficulty communicating verbally due to previous CVA but does nod in response to questions. The patient appears to deny any pain and does not appear in distress.    In ED, patient was noted to have /89 and this dropped to 70s systolic. In the ED the patient received 2L lactated ringers and 2 units pRBCs with initial hemoglobin of 6.5, improving to 8.8. The patient reported no chest pain, however troponin level was checked x2 with 31 and an increase to 229 seen.      Per chart review, the patient sees Dr. Aceves as his Nephrologist and had his AV fistula (Right arm) placed by Dr. Sierra on 3/20/23.  The patient has dialysis scheduled Tuesday, Thursday and Saturday. Last dialysis was Saturday 5/27/2023.     The patient will be admitted to [facility] for continued care and medical management.       Procedure(s) (LRB):  INSERTION, CATHETER, HEMODIALYSIS, DUAL LUMEN (N/A)      Hospital Course:   06/06/2022   Patient feels fine, bleeding resolved.    No complications.    We will transfer patient back  to the nursing home, to continue hemodialysis as scheduled.       Goals of Care Treatment Preferences:  Code Status: Full Code      Consults:   Consults (From admission, onward)        Status Ordering Provider     Inpatient consult to Cardiology  Once        Provider:  Thompson Harper MD    Completed KRYSTYNA, REHMAT U     Inpatient consult to Nephrology  Once        Provider:  Chandler Aceves Jr., MD    Acknowledged MARIA T CORMIER     Inpatient consult to General Surgery  Once        Provider:  Alfred Sierra MD    Completed MARIA T CORMIER          No new Assessment & Plan notes have been filed under this hospital service since the last note was generated.  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 Schedule an appointment as soon as possible for a visit in 2 week(s).    Specialties: General Surgery, Surgery  Contact information:  [address] [place] [address]  [phone]             Provider [facility] Follow up.                 Contact information for after-discharge care     Destination     [facility] .    Service: Skilled Nursing  Contact information:  [address] [place] [address]  [phone]                            Patient Instructions:      Diet renal       Significant Diagnostic Studies: N/A    Pending Diagnostic Studies:     Procedure Component Value Units Date/Time    EXTRA TUBES [[id number]] Collected: 05/29/23 0014    Order Status: Sent Lab Status: In process Updated: 05/29/23 0021    Specimen: Blood, Venous     Narrative:      The following orders were created for panel order EXTRA TUBES.  Procedure                               Abnormality         Status                     ---------                               -----------         ------                     Light Green Top Hold[[id number]]                             In process                   Please view results for these tests on the individual orders.    EXTRA TUBES [[id number]] Collected: 05/28/23 2140    Order Status: Sent Lab Status: In process Updated: 05/28/23 2300    Specimen: Blood, Venous     Narrative:      The following orders were created for panel order EXTRA TUBES.  Procedure                               Abnormality         Status                     ---------                               -----------         ------                     Red Top Hold[[id number]]                                     In process                 Lavender Top Hold[[id number]]                                In process                 Gold Top Hold[[id number]]                                    In process                 Pink Top Hold[[id number]]                                    In process                 Diaz Top Hold[[id number]]                                    Final result                 Please view results for these tests on the individual orders.         Medications:  Reconciled Home Medications:      Medication List      CONTINUE taking these medications    aspirin 81 MG EC tablet  Commonly known as: ECOTRIN  Take 1 tablet (81 mg total) by mouth once daily.     atorvastatin 80 MG tablet  Commonly known as: LIPITOR  Take 1 tablet (80 mg total) by mouth every evening.      desmopressin 0.2 MG tablet  Commonly known as: DDAVP  Take 1 tablet (200 mcg total) by mouth nightly.     furosemide 20 MG tablet  Commonly known as: LASIX  Take 1 tablet (20 mg total) by mouth 2 (two) times daily.     HYDROcodone-acetaminophen 7.5-325 mg per tablet  Commonly known as: NORCO  Take 1 tablet by mouth every 6 (six) hours as needed for Pain.     NIFEdipine 30 MG Tbsr  Commonly known as: ADALAT CC  Take 30 mg by mouth once daily.     polyethylene glycol 17 gram Pwpk  Commonly known as: GLYCOLAX  Take 17 g by mouth 2 (two) times daily as needed.            Indwelling Lines/Drains at time of discharge:   Lines/Drains/Airways     Central Venous Catheter Line  Duration                Hemodialysis Catheter 05/31/23 1400 right subclavian 5 days          Drain  Duration                Hemodialysis AV Fistula 03/20/23 Right upper arm 78 days                Time spent on the discharge of patient: 45 minutes         Alexandre Kahn MD  Department of Hospital Medicine  [facility] - [facility] Tele[facility]

## 2023-06-06 NOTE — HOSPITAL COURSE
06/06/2022   Patient feels fine, bleeding resolved.    No complications.    We will transfer patient back to the nursing home, to continue hemodialysis as scheduled.

## 2023-06-06 NOTE — PLAN OF CARE
OT wanda faxed to jorge at [facility], insurance pending

## 2023-06-06 NOTE — PLAN OF CARE
Problem: Occupational Therapy  Goal: Occupational Therapy Goal  Description: STG: (in 1 week)  Pt will perform grooming with setup and cues  Pt will feed himself with setup  Pt will perform UE dressing with max(A)  Pt will sit EOB x 7 min with CGA   Pt will perform standing  x 1 min with moderate  assistance  Pt will tolerate 15 minutes of tx without fatigue      LT.Restore to max I with self care and mobility.     Outcome: Adequate for Care Transition

## 2023-06-06 NOTE — PT/OT/SLP EVAL
Occupational Therapy Evaluation     Name: Pardeep Collins  MRN: [id number]  Admitting Diagnosis: Hemorrhage of arteriovenous fistula  Recent Surgery: Procedure(s) (LRB):  INSERTION, CATHETER, HEMODIALYSIS, DUAL LUMEN (N/A) 5 Days Post-Op    Recommendations:     Discharge Recommendations: nursing facility, skilled, intermediate care facility/nursing home  Level of Assistance Recommended: 24 hours significant assistance  Discharge Equipment Recommendations:  (to be determined)  Barriers to discharge: None    Assessment:     Pardeep Collins is a 82 y.o. male with a medical diagnosis of Hemorrhage of arteriovenous fistula. He presents with performance deficits affecting function including weakness, impaired balance, impaired coordination, impaired endurance, impaired fine motor, impaired functional mobility, impaired self care skills, impaired cardiopulmonary response to activity, abnormal tone, decreased safety awareness, decreased lower extremity function, decreased ROM, decreased upper extremity function.     Pt was at nursing home/ swing bed post CVA with residual effects to his speech and his (R) upper and lower extremities. Pt brought to hospital with hemorrhage of AV fistula. Prior to his CVA  minMay 2023, pt was living at home, ambulating with a cane and performing his own self-care.    Rehab Prognosis: Fair; patient would benefit from acute OT services to address these deficits and reach maximum level of function.    Plan:     Patient to be seen 5 x/week to address the above listed problems via self-care/home management, therapeutic activities, therapeutic exercises  Plan of Care Expires: 06/12/23  Plan of Care Reviewed with: patient    Subjective     Chief Complaint: hemorrhage of his AV fistula  Patient Comments/Goals: Pt is expecting to return to NH/swing bed at D/C  Pain/Comfort:  Pain Rating 1: 0/10  Pain Rating Post-Intervention 1: 0/10    Patients cultural, spiritual, Sikh conflicts given the current  situation: no    Social History:  Living Environment: Patient lives in a nursing home   Prior Level of Function: Prior to admission, patient requires assistance with ADLs including grooming, feeding, dressing, bathing, toileting, and functional mobility  Roles and Routines: Patient was not driving, not working, and disabled prior to admission.  Equipment Used at Home: cane, straight, walker, rolling  DME owned (not currently used): none  Assistance Upon Discharge: facility staff    Objective:     Communicated with ANGELICA Medina prior to session. Patient found supine with peripheral IV, bed alarm upon OT entry to room.    General Precautions: Standard, fall   Orthopedic Precautions: N/A   Braces: N/A    Respiratory Status: Room air    Occupational Performance    Gait belt applied - Yes    Bed Mobility:   Rolling/Turning to Left with moderate assistance  Rolling/Turning to Right with minimum assistance  Supine to sit from left side of bed with maximal assistance and of 2 persons  Sit to Supine with maximal assistance and of 2 persons on left side of bed    Functional Mobility/Transfers:  Sit <> Stand Transfer with moderate assistance with gait belt and manual assistance  Functional Mobility: Pt unable to take steps    Activities of Daily Living:  Upper Body Dressing: maximal assistance  Lower Body Dressing: dependence  Toileting: dependence    Cognitive/Visual Perceptual:  Cognitive/Psychosocial Skills:    -     Oriented to: Person  -     Follows Commands/attention: Follows one-step commands  -     Communication: expressive aphasia  -     Safety awareness/insight to disability: impaired  -     Mood/Affect/Coping skills/emotional control: Guarded    Physical Exam:  Balance:    -     Sitting: moderate assistance  -     Standing: moderate assistance  Postural examination/scapula alignment:    -       Rounded shoulders  -       Forward head  Sensation:    -       Intact  Upper Extremity Range of Motion:     -        Right Upper Extremity: severely impaired AROM and PROM, shoulder with subluxation 1 1/2 finger width, mild flexion contracture of elbow  -       Left Upper Extremity: WFL  Upper Extremity Strength:    -       Right Upper Extremity: severely impaired  -       Left Upper Extremity: WFL   Strength:    -       Right Upper Extremity: severely impaired  -       Left Upper Extremity: WFL  Fine Motor Coordination:    -       Intact  Left hand thumb/finger opposition skills  Gross motor coordination:   (L) UE is WFL    AMPAC 6 Click ADL:  AMPAC Total Score: 11    Treatment & Education:  Patient educated on role of OT, POC, and goals for therapy  Patient educated on importance of OOB activities with staff member assistance and sitting OOB majority of the day        Patient left right sidelying with all lines intact, call button in reach, RN notified, and bed alarm on.    GOALS:   Multidisciplinary Problems       Occupational Therapy Goals          Problem: Occupational Therapy    Goal Priority Disciplines Outcome Interventions   Occupational Therapy Goal     OT, PT/OT Adequate for Care Transition    Description: STG: (in 1 week)  Pt will perform grooming with setup and cues  Pt will feed himself with setup  Pt will perform UE dressing with max(A)  Pt will sit EOB x 7 min with CGA   Pt will perform standing  x 1 min with moderate  assistance  Pt will tolerate 15 minutes of tx without fatigue      LT.Restore to max I with self care and mobility.                          History:     Past Medical History:   Diagnosis Date    CVA (cerebral vascular accident)     Dialysis patient     Elevated PSA     Gout, unspecified     Hypertension     Renal disorder     Rheumatoid arthritis, unspecified          Past Surgical History:   Procedure Laterality Date    AV FISTULA PLACEMENT Right 3/20/2023    Procedure: CREATION, AV FISTULA;  Surgeon: Alfred Sierra MD;  Location: [facility];  Service: General;  Laterality: Right;   right basilic vein transposition    HAND SURGERY Left     INSERTION OF TUNNELED CENTRAL VENOUS HEMODIALYSIS CATHETER N/A 5/31/2023    Procedure: INSERTION, CATHETER, HEMODIALYSIS, DUAL LUMEN;  Surgeon: Alfred Sierra MD;  Location: [facility];  Service: General;  Laterality: N/A;    urolift      in [place];       Time Tracking:     OT Date of Treatment: 06/05/23  OT Start Time: 1352  OT Stop Time: 1402  OT Total Time (min): 10 min    Billable Minutes: Evaluation low complexity    6/5/2023

## 2023-06-06 NOTE — NURSING
0712 received patient lying in bed, no distress. Pt alert, communication limited but pt nods in response to questions. Pt denies pain. Right chest HD cath intact. R arm fistula dressing with old drainage. Safety measures in place.     0932 scheduled med given. See MAR. Safety measures in place.     1040 report  called to [facility]. All questions answered. IV removed. New dressing placed to right arm fistula site. No bleeding.

## 2023-06-06 NOTE — PROGRESS NOTES
Patient denies breath.  Review of systems GI denies nausea or vomiting    Physical exam general patient chronically ill-appearing, he is in no acute distress    Assessment/plan 1.  ESRD- Continue HD support  2.  Hyperkalemia-this has resolved  3. Hyperlipidemia  4.  Anemia-continue EPO  5.  HTN-this is controlled      Vitals:    06/06/23 0011 06/06/23 0419 06/06/23 0657 06/06/23 1025   BP: 134/76 122/73 102/60 129/65   BP Location:   Left arm Left arm   Patient Position:   Lying Lying   Pulse: 108 105 96 80   Resp: 20 20 19 19   Temp: 98 °F (36.7 °C) 98 °F (36.7 °C) 98.2 °F (36.8 °C) 98.1 °F (36.7 °C)   TempSrc:   Oral Oral   SpO2: (!) 94% 97% 97% 98%   Weight:       Height:

## 2023-06-06 NOTE — PLAN OF CARE
[facility] Telemetry  Discharge Final Note    Primary Care Provider: Provider [facility]    Expected Discharge Date:     Final Discharge Note (most recent)       Final Note - 06/06/23 0914          Final Note    Assessment Type Final Discharge Note     Anticipated Discharge Disposition Skilled Nursing Facility        Post-Acute Status    Post-Acute Authorization Placement     Post-Acute Placement Status Set-up Complete/Auth obtained     Discharge Delays None known at this time                     Important Message from Medicare  Important Message from Medicare regarding Discharge Appeal Rights: Given to patient/caregiver, Explained to patient/caregiver, Signed/date by patient/caregiver     Date IMM was signed: 05/29/23  Time IMM was signed: 1100    Contact Info       Alfred Sierra MD   Specialty: General Surgery, Surgery    [address] [place] [address] [place] [address]   Phone: [phone]       Next Steps: Schedule an appointment as soon as possible for a visit in 2 week(s)        Spoke with Cherri at [facility] and pt approved by insurance and can be admitted today, notified Dr. Kahn, notified pts sister Jazmin, packet to renee

## 2023-06-20 ENCOUNTER — OFFICE VISIT (OUTPATIENT)
Dept: SURGERY | Facility: CLINIC | Age: 83
End: 2023-06-20
Attending: SURGERY
Payer: MEDICARE

## 2023-06-20 DIAGNOSIS — N18.6 ESRD (END STAGE RENAL DISEASE): Primary | ICD-10-CM

## 2023-06-20 PROCEDURE — 99215 OFFICE O/P EST HI 40 MIN: CPT | Mod: PBBFAC | Performed by: SURGERY

## 2023-06-20 PROCEDURE — 1111F DSCHRG MED/CURRENT MED MERGE: CPT | Mod: CPTII,,, | Performed by: SURGERY

## 2023-06-20 PROCEDURE — 1159F MED LIST DOCD IN RCRD: CPT | Mod: CPTII,,, | Performed by: SURGERY

## 2023-06-20 PROCEDURE — 99214 OFFICE O/P EST MOD 30 MIN: CPT | Mod: S$PBB,,, | Performed by: SURGERY

## 2023-06-20 PROCEDURE — 1111F PR DISCHARGE MEDS RECONCILED W/ CURRENT OUTPATIENT MED LIST: ICD-10-PCS | Mod: CPTII,,, | Performed by: SURGERY

## 2023-06-20 PROCEDURE — 1159F PR MEDICATION LIST DOCUMENTED IN MEDICAL RECORD: ICD-10-PCS | Mod: CPTII,,, | Performed by: SURGERY

## 2023-06-20 PROCEDURE — 99214 PR OFFICE/OUTPT VISIT, EST, LEVL IV, 30-39 MIN: ICD-10-PCS | Mod: S$PBB,,, | Performed by: SURGERY

## 2023-06-20 RX ORDER — SODIUM CHLORIDE 9 MG/ML
INJECTION, SOLUTION INTRAVENOUS CONTINUOUS
Status: CANCELLED | OUTPATIENT
Start: 2023-06-20

## 2023-06-20 NOTE — PATIENT INSTRUCTIONS
[facility]      Your surgery is scheduled for 6/26/23 at [facility] on the ground floor of the Ambulatory building. You should arrive at 0700 at the Ambulatory Care Center located at [address].                                                                                                                                                                                                                                                                                                                                                           Preoperative Instructions        Your pre-op lab work will be on today on the 1st floor of the [facility] building.                                                                                                                                            Day of Surgery Instructions      Bring a list of all your medications with you the day of your surgery. You can also give the list to your doctor or nurse during your final clinic appointment before surgery.    Stop taking all herbal medications 14 days prior to surgery.  Stop drinking alcoholic beverages for 24 hours before surgery. Do not drink alcohol for 24 hours after surgery.  Eat a light supper on the night before your surgery.  Do not eat any solid foods or drink any liquids after 12:00 AM (midnight). This includes gum, hard candy, mints, and chewing tobacco.  Medications: Take any medications specified with a small sip of water the morning of your surgery.  Brush your teeth: You may brush your teeth and rinse your mouth. Do not swallow any water or toothpaste.  Clothing: A button front shirt and loose-fitting clothes are the most comfortable before and after surgery. We also recommend low-heeled shoes.  Hair: Avoid buns, ponytails, or hairpieces at the back of the head. Remove or avoid any clips, pins or bands that bind hair. Do not use hairspray. Before going to surgery, you will  need to remove any wigs or hairpieces.  We will cover your hair during surgery. Your privacy regarding personal appearance will be respected.  Fingernails: Please be sure to remove all nail polish before you arrive for surgery. We understand that tips, wraps, gels, etc., are expensive; however, we ask these products to be removed from at least one finger on each hand. Your fingertips are used to accurately monitor your oxygen level during surgery by a device called an oximeter.  Glasses and Contact Lenses: Wear glasses when possible. If contact lenses must be worn, bring a lens case and solution. If glasses are worn, bring a case for them.  Hearing Aids: If you rely on a hearing aid, wear it to the hospital on the day of surgery. This will ensure you can hear and understand everything we need to communicate with you.  Valuables: Jewelry, including body piercings, Dentures, money, and credit cards should be left at home. [facility] is not responsible for valuables that are not secured in our surgery center.  Makeup, Perfume, Creams, Lotions and Deodorants: Do not use any of these products on the day of surgery. Remove false eyelashes prior to surgery.  Implanted Medical Devices: If you have an implanted device, such as a pacemaker or AICD, bring the device information card (if you have it) with you.  Medical Equipment: If you have been fitted for a brace to wear after surgery or you have been given crutches, bring those with you to the surgery center.  Shower: Take a shower with Hibiclens® (chlorhexidine) (available over the counter). This reduces the chance of infection. PLEASE USE CHLORHEXIDINE WASH THE NIGHT BEFORE SURGERY AND THE MORNING OF SURGERY.      If you are diabetic      Follow the diabetic medicine instructions you received during your pre-operative visit.  DO NOT take your insulin or diabetic medications the morning of surgery.  When you arrive at the surgical center, be sure to tell the nurse you are  diabetic.            Other Items to bring with you and know      Insurance card  Identification card such as 's license, passport, or other picture ID  Copy of your advance directives  List of medications and allergies, if not already provided  Name and phone number of person to contact if your condition changes significantly. YOU CANNOT DRIVE YOURSELF HOME FROM THE HOSPITAL THE DAY OF SURGERY.  PLEASE UNDERSTAND THAT OUR OFFICE DOES NOT GIVE PATHOLOGY RESULTS OR TEST RESULTS OVER THE PHONE. THIS WILL BE DISCUSSED WITH YOU ON YOUR FOLLOW UP APPOINTMENT.        Medication instructions:  You may take blood pressure medication with a small drink of water the morning of surgery.    The following blood sugar medications have to be stopped prior to surgery:    Metformin, Glucovance, Metaglip, Fortamet, Glucophage, Riomet, Avandamet, Glimepiride    IF YOU ARE ON ANY OF THESE BLOOD THINNERS, MAKE SURE YOUR PHYSICIAN IS AWARE.  Eliquis/Apixaban         Wafarin/Coumadin,Jantoven  Xarelto/Rivaroxaban   Pletal/Cilostazol  Plavix/Clopidogrel                                                              Pradaxa/Dibigatran        Alcohol and Surgery  We want to help you prepare for and recover from surgery as quickly and safely as possible. Be open and honest with your provider about how many drinks you have per day. Excessive alcohol use is defined as drinking more than three drinks per day. It can affect the outcome of your surgery. Binge drinking (consuming large amounts of alcohol infrequently, such as on weekends) can also affect the outcome of your surgery.  Alcohol withdrawal  If you drink more than three drinks a day, you could have a complication, called alcohol withdrawal, after surgery.  Alcohol withdrawal is a set of symptoms that people have when they suddenly stop drinking after using alcohol  for a long time. During withdrawal, a person's central nervous system overreacts. This can cause mild symptoms such as  shakiness, sweating or hallucinating. It can also cause other more serious side effects. If not treated properly, alcohol withdrawal can cause potentially life-threatening complications after surgery. This can include tremors, seizures, hallucinations, delirium tremors, and even death. Untreated alcohol withdrawal often leads to a longer stay in the hospital, potentially in the Intensive Care Unit.  Chronic heavy drinking also can interfere with several organ systems and biochemical processes in the body.  This interference can cause serious, even life-threatening complications.  Your care team can offer alcohol withdrawal treatment to help:  Decrease the risk of seizures and delirium tremors after surgery  Decrease the risk we will need to restrain you for your own safety or the safety of others  Decrease your risk of falling after surgery  Reduce the use of potent sedative medications  Reduce the time you stay in the hospital after surgery  Reduce the time you might spend on a mechanical ventilator to help you breathe  Lower incidence of organ failure and biochemical complications  Talk to a member of your care team or your primary care physician about your alcohol use if you feel you may be at risk of any of these complications.        Smoking and Surgery  Quitting smoking is extremely important for a successful surgery and recovery. Cigarette smoking compromises your immune system. This increases your risk of an infection after surgery. Quitting the habit before surgery will decrease the surgical risks associated with smoking.

## 2023-06-20 NOTE — PROGRESS NOTES
General Surgery History and Physical      Patient ID: Pardeep Collins is a 82 y.o. male.    Chief Complaint: Post-op Evaluation      HPI:  82-year-old male status post basilic vein transposition that had some bleeding complications during access required pressure which occluded completely.  Fistulas borderline size from the beginning.  Had a have a tunneled dialysis catheter inserted and now is here for new access placement    Review of Systems   Constitutional:  Negative for activity change, appetite change, fatigue and fever.   HENT:  Negative for trouble swallowing.    Respiratory:  Negative for cough and shortness of breath.    Cardiovascular:  Negative for chest pain and palpitations.   Gastrointestinal:  Negative for abdominal distention, abdominal pain, blood in stool, constipation and diarrhea.   Genitourinary:  Negative for flank pain.   Musculoskeletal:  Negative for neck pain and neck stiffness.   Neurological:  Negative for weakness.     Current Outpatient Medications   Medication Sig Dispense Refill    aspirin (ECOTRIN) 81 MG EC tablet Take 1 tablet (81 mg total) by mouth once daily.  0    atorvastatin (LIPITOR) 80 MG tablet Take 1 tablet (80 mg total) by mouth every evening. 90 tablet 3    desmopressin (DDAVP) 0.2 MG tablet Take 1 tablet (200 mcg total) by mouth nightly. 30 tablet 11    furosemide (LASIX) 20 MG tablet Take 1 tablet (20 mg total) by mouth 2 (two) times daily. 60 tablet 1    HYDROcodone-acetaminophen (NORCO) 7.5-325 mg per tablet Take 1 tablet by mouth every 6 (six) hours as needed for Pain. 15 tablet 0    NIFEdipine (ADALAT CC) 30 MG TbSR Take 30 mg by mouth once daily.      polyethylene glycol (GLYCOLAX) 17 gram PwPk Take 17 g by mouth 2 (two) times daily as needed.  0     No current facility-administered medications for this visit.       Review of patient's allergies indicates:  No Known  Allergies    Past Medical History:   Diagnosis Date    CVA (cerebral vascular accident)     Dialysis patient     Elevated PSA     Gout, unspecified     Hypertension     Renal disorder     Rheumatoid arthritis, unspecified        Past Surgical History:   Procedure Laterality Date    AV FISTULA PLACEMENT Right 3/20/2023    Procedure: CREATION, AV FISTULA;  Surgeon: Alfred Sierra MD;  [facility] OR;  Service: General;  Laterality: Right;  right basilic vein transposition    HAND SURGERY Left     INSERTION OF TUNNELED CENTRAL VENOUS HEMODIALYSIS CATHETER N/A 2023    Procedure: INSERTION, CATHETER, HEMODIALYSIS, DUAL LUMEN;  Surgeon: Alfred Sierra MD;  [facility];  Service: General;  Laterality: N/A;    urolift      in [place];       Family History   Problem Relation Age of Onset    Heart disease Father     Breast cancer Sister        Social History     Socioeconomic History    Marital status: Single   Tobacco Use    Smoking status: Former     Types: Cigarettes     Quit date:      Years since quittin.4    Smokeless tobacco: Never   Substance and Sexual Activity    Alcohol use: Not Currently     Comment: quit in     Drug use: Never    Sexual activity: Not Currently     Social Determinants of Health     Financial Resource Strain: Low Risk     Difficulty of Paying Living Expenses: Not hard at all   Food Insecurity: No Food Insecurity    Worried About Running Out of Food in the Last Year: Never true    Ran Out of Food in the Last Year: Never true   Transportation Needs: No Transportation Needs    Lack of Transportation (Medical): No    Lack of Transportation (Non-Medical): No   Physical Activity: Inactive    Days of Exercise per Week: 2 days    Minutes of Exercise per Session: 0 min   Stress: No Stress Concern Present    Feeling of Stress : Not at all   Social Connections: Socially Isolated    Frequency of Communication with Friends and Family: Once a week    Frequency of Social  Gatherings with Friends and Family: Once a week    Attends [employer] Services: More than 4 times per year    Active Member of Clubs or Organizations: No    Attends Club or Organization Meetings: Never    Marital Status: Never    Housing Stability: Low Risk     Unable to Pay for Housing in the Last Year: No    Number of Places Lived in the Last Year: 1    Unstable Housing in the Last Year: No       There were no vitals filed for this visit.    Physical Exam  Constitutional:       General: He is not in acute distress.  HENT:      Head: Normocephalic.   Cardiovascular:      Rate and Rhythm: Normal rate and regular rhythm.      Pulses: Normal pulses.   Pulmonary:      Effort: Pulmonary effort is normal. No respiratory distress.      Breath sounds: Normal breath sounds.   Abdominal:      General: Abdomen is flat. There is no distension.      Palpations: Abdomen is soft.      Tenderness: There is no abdominal tenderness.   Musculoskeletal:         General: Normal range of motion.   Skin:     General: Skin is warm.   Neurological:      General: No focal deficit present.      Mental Status: He is oriented to person, place, and time.       Assessment & Plan:    ESRD (end stage renal disease)  -     Cancel: EKG 12-lead; Future; Expected date: 06/25/2023  -     CBC Auto Differential; Future; Expected date: 06/20/2023  -     Comprehensive Metabolic Panel; Future; Expected date: 06/25/2023  -     Case Request Operating Room: CREATION, AV FISTULA        Patient to go to the OR for a right AV graft placement.  Risks and benefits explained to the patient including risk of bleeding, infection, steal syndrome, numbness in the hand, problems with the graft, possible need for additional operations or procedures.  All questions were answered

## 2023-06-26 ENCOUNTER — HOSPITAL ENCOUNTER (OUTPATIENT)
Facility: HOSPITAL | Age: 83
Discharge: HOME | End: 2023-06-26
Attending: SURGERY | Admitting: SURGERY
Payer: MEDICARE

## 2023-06-26 DIAGNOSIS — N18.6 ESRD (END STAGE RENAL DISEASE): ICD-10-CM

## 2023-06-26 LAB
ANION GAP SERPL CALCULATED.3IONS-SCNC: 7 MMOL/L (ref 7–16)
BUN SERPL-MCNC: 23 MG/DL (ref 7–18)
BUN/CREAT SERPL: 4 (ref 6–20)
CALCIUM SERPL-MCNC: 9.7 MG/DL (ref 8.5–10.1)
CHLORIDE SERPL-SCNC: 98 MMOL/L (ref 98–107)
CO2 SERPL-SCNC: 36 MMOL/L (ref 21–32)
CREAT SERPL-MCNC: 5.17 MG/DL (ref 0.7–1.3)
EGFR (NO RACE VARIABLE) (RUSH/TITUS): 11 ML/MIN/1.73M2
GLUCOSE SERPL-MCNC: 114 MG/DL (ref 74–106)
POTASSIUM SERPL-SCNC: 3.7 MMOL/L (ref 3.5–5.1)
SODIUM SERPL-SCNC: 137 MMOL/L (ref 136–145)

## 2023-06-26 PROCEDURE — 99499 NO LOS: ICD-10-PCS | Mod: ,,, | Performed by: SURGERY

## 2023-06-26 PROCEDURE — 99499 UNLISTED E&M SERVICE: CPT | Mod: ,,, | Performed by: SURGERY

## 2023-06-26 PROCEDURE — 80048 BASIC METABOLIC PNL TOTAL CA: CPT | Performed by: SURGERY

## 2023-06-26 RX ORDER — POTASSIUM CHLORIDE 750 MG/1
10 CAPSULE, EXTENDED RELEASE ORAL ONCE
COMMUNITY

## 2023-06-26 RX ORDER — SODIUM CHLORIDE 9 MG/ML
INJECTION, SOLUTION INTRAVENOUS CONTINUOUS
Status: DISCONTINUED | OUTPATIENT
Start: 2023-06-26 | End: 2023-06-26 | Stop reason: HOSPADM

## 2023-06-26 RX ORDER — MEPERIDINE HYDROCHLORIDE 25 MG/ML
25 INJECTION INTRAMUSCULAR; INTRAVENOUS; SUBCUTANEOUS ONCE AS NEEDED
Status: CANCELLED | OUTPATIENT
Start: 2023-06-26 | End: 2023-06-27

## 2023-06-26 RX ORDER — DIPHENHYDRAMINE HYDROCHLORIDE 50 MG/ML
25 INJECTION INTRAMUSCULAR; INTRAVENOUS EVERY 6 HOURS PRN
Status: CANCELLED | OUTPATIENT
Start: 2023-06-26

## 2023-06-26 RX ORDER — HYDROMORPHONE HYDROCHLORIDE 2 MG/ML
0.5 INJECTION, SOLUTION INTRAMUSCULAR; INTRAVENOUS; SUBCUTANEOUS EVERY 5 MIN PRN
Status: CANCELLED | OUTPATIENT
Start: 2023-06-26

## 2023-06-26 RX ORDER — METOPROLOL TARTRATE 25 MG/1
25 TABLET, FILM COATED ORAL 2 TIMES DAILY
COMMUNITY

## 2023-06-26 RX ORDER — ACETAMINOPHEN 160 MG/1
160 BAR, CHEWABLE ORAL EVERY 4 HOURS PRN
COMMUNITY

## 2023-06-26 RX ORDER — FERROUS SULFATE 325(65) MG
325 TABLET ORAL
COMMUNITY

## 2023-06-26 RX ORDER — ONDANSETRON 2 MG/ML
4 INJECTION INTRAMUSCULAR; INTRAVENOUS DAILY PRN
Status: CANCELLED | OUTPATIENT
Start: 2023-06-26

## 2023-06-26 RX ORDER — ROSUVASTATIN CALCIUM 20 MG/1
20 TABLET, COATED ORAL NIGHTLY
COMMUNITY

## 2023-08-28 PROBLEM — I21.A1 TYPE 2 MI (MYOCARDIAL INFARCTION): Status: RESOLVED | Noted: 2023-04-02 | Resolved: 2023-08-28

## (undated) DEVICE — SEE MEDLINE ITEM 159592

## (undated) DEVICE — GLOVE 6.0 PROTEXIS PI MICRO

## (undated) DEVICE — SUT 3-0 VICRYL / SH (J416)

## (undated) DEVICE — GLOVE PROTEXIS PI SYN SURG 6.5

## (undated) DEVICE — STRIP MEDI WND CLSR 1X5IN

## (undated) DEVICE — SUT VICRYL 2-0 36 CT-1

## (undated) DEVICE — STRIP MEDI WND CLSR 1/2X4IN

## (undated) DEVICE — BAG RECTANGLE RBBRBND 30X36IN

## (undated) DEVICE — SYR ONLY LUER LOCK 20CC

## (undated) DEVICE — SUT SILK 2.0 BLK 18

## (undated) DEVICE — APPLICATOR CHLORAPREP ORN 26ML

## (undated) DEVICE — SKIN STAPLER PMR35

## (undated) DEVICE — GLOVE PROTEXIS PI SYN SURG 6.0

## (undated) DEVICE — GLOVE PROTEXIS PI SYN SURG 7

## (undated) DEVICE — BANDAGE KERLIX AMD

## (undated) DEVICE — SUT MONOCYRL 4-0 PS2 UND

## (undated) DEVICE — CONTRAST ISOVUE 300 50ML

## (undated) DEVICE — GOWN POLY REINF BRTH SLV XL

## (undated) DEVICE — GLOVE 6.0 PROTEXIS PI BLUE

## (undated) DEVICE — HEMOSTAT SURGICEL 4X8IN

## (undated) DEVICE — SUT PROLENE 6-0 BV-1 30IN

## (undated) DEVICE — ADHESIVE MASTISOL VIAL 48/BX

## (undated) DEVICE — SUT CTD VICRYL VIL BR SH 27

## (undated) DEVICE — Device

## (undated) DEVICE — COVER PROBE WITH BAND 6X96IN

## (undated) DEVICE — APPLIER LIGACLIP SM 9.38IN

## (undated) DEVICE — PAD CURAD NONADH 3X4IN

## (undated) DEVICE — SUT PROLENE 5-0 36IN C-1

## (undated) DEVICE — GLOVE 7.0 PROTEXIS PI BLUE

## (undated) DEVICE — DRESSING TRANS 2X2 TEGADERM

## (undated) DEVICE — GLOVE 6.5 PROTEXIS PI BLUE

## (undated) DEVICE — BANDAGE GAUZE COT STRL 4.5X4.1

## (undated) DEVICE — SYR 10CC LUER LOCK

## (undated) DEVICE — GOWN ASTOUND AAMI LVL3 BLUE LG

## (undated) DEVICE — GOWN NONREINF SET-IN SLV 2XL

## (undated) DEVICE — GLOVE PROTEXIS PI SYN SURG 7.5

## (undated) DEVICE — DRESSING ANTIMICROBIAL 1 INCH

## (undated) DEVICE — DRAPE INCISE IOBAN 2 13X13IN

## (undated) DEVICE — DECANTER FLUID TRNSF WHITE 9IN